# Patient Record
Sex: MALE | Race: WHITE | Employment: OTHER | URBAN - METROPOLITAN AREA
[De-identification: names, ages, dates, MRNs, and addresses within clinical notes are randomized per-mention and may not be internally consistent; named-entity substitution may affect disease eponyms.]

---

## 2019-10-16 ENCOUNTER — OFFICE VISIT (OUTPATIENT)
Dept: INTERNAL MEDICINE CLINIC | Age: 84
End: 2019-10-16

## 2019-10-16 VITALS
OXYGEN SATURATION: 96 % | WEIGHT: 178 LBS | TEMPERATURE: 98 F | RESPIRATION RATE: 16 BRPM | HEART RATE: 64 BPM | SYSTOLIC BLOOD PRESSURE: 138 MMHG | DIASTOLIC BLOOD PRESSURE: 86 MMHG | HEIGHT: 68 IN | BODY MASS INDEX: 26.98 KG/M2

## 2019-10-16 DIAGNOSIS — K21.9 GASTROESOPHAGEAL REFLUX DISEASE, ESOPHAGITIS PRESENCE NOT SPECIFIED: ICD-10-CM

## 2019-10-16 DIAGNOSIS — E78.5 HYPERLIPIDEMIA, UNSPECIFIED HYPERLIPIDEMIA TYPE: ICD-10-CM

## 2019-10-16 DIAGNOSIS — R06.09 DOE (DYSPNEA ON EXERTION): ICD-10-CM

## 2019-10-16 DIAGNOSIS — G25.0 ESSENTIAL TREMOR: ICD-10-CM

## 2019-10-16 DIAGNOSIS — Z23 ENCOUNTER FOR IMMUNIZATION: ICD-10-CM

## 2019-10-16 DIAGNOSIS — Z85.820 HISTORY OF MELANOMA: ICD-10-CM

## 2019-10-16 DIAGNOSIS — N32.81 OAB (OVERACTIVE BLADDER): ICD-10-CM

## 2019-10-16 DIAGNOSIS — Z76.89 ESTABLISHING CARE WITH NEW DOCTOR, ENCOUNTER FOR: Primary | ICD-10-CM

## 2019-10-16 DIAGNOSIS — R01.1 MURMUR: ICD-10-CM

## 2019-10-16 RX ORDER — RANITIDINE 150 MG/1
150 TABLET, FILM COATED ORAL
Qty: 90 TAB | Refills: 3 | Status: SHIPPED | OUTPATIENT
Start: 2019-10-16 | End: 2019-10-16 | Stop reason: SDUPTHER

## 2019-10-16 RX ORDER — PRIMIDONE 50 MG/1
TABLET ORAL
Qty: 30 TAB | Refills: 1 | Status: SHIPPED | OUTPATIENT
Start: 2019-10-16 | End: 2019-10-16 | Stop reason: SDUPTHER

## 2019-10-16 RX ORDER — ZINC GLUCONATE 10 MG
LOZENGE ORAL
COMMUNITY
End: 2020-01-30

## 2019-10-16 RX ORDER — OMEPRAZOLE 20 MG/1
CAPSULE, DELAYED RELEASE ORAL
COMMUNITY
Start: 2019-09-07 | End: 2020-02-04 | Stop reason: SDUPTHER

## 2019-10-16 RX ORDER — RANITIDINE 150 MG/1
150 TABLET, FILM COATED ORAL
Qty: 90 TAB | Refills: 3 | Status: SHIPPED | OUTPATIENT
Start: 2019-10-16 | End: 2019-12-03

## 2019-10-16 RX ORDER — NIACIN 500 MG/1
TABLET, FILM COATED, EXTENDED RELEASE ORAL DAILY
COMMUNITY
Start: 2019-09-25 | End: 2020-09-11 | Stop reason: SDUPTHER

## 2019-10-16 RX ORDER — RANITIDINE 150 MG/1
150 TABLET, FILM COATED ORAL 2 TIMES DAILY
COMMUNITY
End: 2019-10-16 | Stop reason: SDUPTHER

## 2019-10-16 RX ORDER — LANOLIN ALCOHOL/MO/W.PET/CERES
5000 CREAM (GRAM) TOPICAL DAILY
COMMUNITY

## 2019-10-16 RX ORDER — PRIMIDONE 50 MG/1
TABLET ORAL
Qty: 90 TAB | Refills: 1 | Status: SHIPPED | OUTPATIENT
Start: 2019-10-16 | End: 2019-10-16 | Stop reason: SDUPTHER

## 2019-10-16 RX ORDER — FOLIC ACID 1 MG/1
TABLET ORAL DAILY
COMMUNITY

## 2019-10-16 RX ORDER — GLUCOSAMINE SULFATE 1500 MG
2000 POWDER IN PACKET (EA) ORAL DAILY
COMMUNITY

## 2019-10-16 RX ORDER — TOLTERODINE TARTRATE 4 MG/1
CAPSULE, EXTENDED RELEASE ORAL
COMMUNITY
Start: 2019-08-09 | End: 2019-11-01 | Stop reason: SDUPTHER

## 2019-10-16 RX ORDER — SIMVASTATIN 40 MG/1
TABLET, FILM COATED ORAL
Status: ON HOLD | COMMUNITY
Start: 2019-08-28 | End: 2019-12-04 | Stop reason: ALTCHOICE

## 2019-10-16 RX ORDER — PRIMIDONE 50 MG/1
TABLET ORAL
Qty: 90 TAB | Refills: 1 | Status: SHIPPED | OUTPATIENT
Start: 2019-10-16 | End: 2019-11-20 | Stop reason: SDUPTHER

## 2019-10-16 NOTE — PROGRESS NOTES
Nan Smith is a 80 y.o. male who presents today for Establish Care  . He has a history of There is no problem list on file for this patient. .    Today patient is here to establish care. Previous PCP in [place]. he is switching because they recently moved here. Records are not available for me to review. Patient does note that he is been a bit more sluggish or short of breath with exertion. This is been somewhat progressive over the last couple years. Denies any chest pain. Denies any lower extremity swelling. Essential tremor: Patient has had this for some time. He does note that he has tried a beta-blocker and had too many side effects with this in the past.  He does note recently has been a bit worse though he is been a bit more stressed out. We discussed options including primidone. We will start this at a low dose and taper up. Hyperlipidemia: Patient taking simvastatin. He is also on Niaspan. OAB: Patient on Detrol. He notes that this has been stable. History of melanoma: Patient notes that he had a melanoma in the past. No recurrence. GERD: Patient takes omeprazole and H2 blocker. He notes that his symptoms have been stable. Social: Patient recently moved here from [place]. He lives at home with his wife. From [facility] originally. Denies any smoking history. He is independent of all activities of daily living. 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 One son. 2 grandchildren, one great-grandchildren. Social alcohol. No significant smoking history. Family History: reviewed    ROS  Review of Systems   Constitutional: Negative for chills, fever and weight loss. HENT: Negative for congestion and sore throat. Eyes: Negative for blurred vision, double vision and photophobia. Respiratory: Positive for shortness of breath. Negative for cough and hemoptysis.     Cardiovascular: Negative for chest pain, palpitations, orthopnea, claudication, leg swelling and PND. Gastrointestinal: Negative for abdominal pain, constipation, diarrhea, heartburn, nausea and vomiting. Genitourinary: Negative for dysuria, frequency and urgency. Musculoskeletal: Negative for joint pain and myalgias. Skin: Negative for rash. Neurological: Positive for tremors. Negative for dizziness, tingling, sensory change, speech change, seizures, weakness and headaches. Endo/Heme/Allergies: Does not bruise/bleed easily. Psychiatric/Behavioral: Negative for memory loss and suicidal ideas. Visit Vitals  /86 (BP 1 Location: Left arm, BP Patient Position: Sitting)   Pulse 64   Temp 98 °F (36.7 °C) (Oral)   Resp 16   Ht 5' 7.52\" (1.715 m)   Wt 178 lb (80.7 kg)   SpO2 96%   BMI 27.45 kg/m²       Physical Exam   Constitutional: He is oriented to person, place, and time and well-developed, well-nourished, and in no distress. No distress. HENT:   Head: Normocephalic and atraumatic. Mouth/Throat: No oropharyngeal exudate. Eyes: Pupils are equal, round, and reactive to light. Conjunctivae are normal. No scleral icterus. Neck: Normal range of motion. No JVD present. No thyromegaly present. Cardiovascular: Normal rate and regular rhythm. Exam reveals no gallop and no friction rub. Murmur heard. Loud systolic ejection murmur. Pulmonary/Chest: Effort normal and breath sounds normal. No respiratory distress. He has no wheezes. Abdominal: Soft. Bowel sounds are normal. He exhibits no distension. There is no rebound and no guarding. Musculoskeletal: Normal range of motion. He exhibits no edema. Neurological: He is alert and oriented to person, place, and time. No cranial nerve deficit. Patient with an essential tremor. Involve both the upper extremities and head   Skin: Skin is dry. No rash noted.    Psychiatric: Mood, memory, affect and judgment normal.       Current Outpatient Medications   Medication Sig    NIASPAN 500 mg tablet     omeprazole (PRILOSEC) 20 mg capsule     simvastatin (ZOCOR) 40 mg tablet     DETROL LA 4 mg ER capsule     raNITIdine (ZANTAC) 150 mg tablet Take 150 mg by mouth two (2) times a day.  Vit C-Vit E-Copper-ZnOx-Lutein (PRESERVISION LUTEIN) 226 mg-200 unit -5 mg-0.8 mg cap Take  by mouth.  magnesium 250 mg tab Take  by mouth.  COQ10, UBIQUINOL, PO Take  by mouth.  cholecalciferol (VITAMIN D3) 1,000 unit cap Take  by mouth daily.  cyanocobalamin 1,000 mcg tablet Take 1,000 mcg by mouth daily.  folic acid (FOLVITE) 1 mg tablet Take  by mouth daily. No current facility-administered medications for this visit. Past Medical History:   Diagnosis Date    GERD (gastroesophageal reflux disease)     Hyperactivity of bladder     Hypercholesterolemia     Macular degeneration disease     Skin cancer (melanoma) (HCC)     Tremor       Past Surgical History:   Procedure Laterality Date    HX HERNIA REPAIR      HX MOHS PROCEDURES        Social History     Tobacco Use    Smoking status: Never Smoker    Smokeless tobacco: Never Used   Substance Use Topics    Alcohol use: Yes     Comment: beer or glass of wine nightly       Family History   Problem Relation Age of Onset    Cancer Father     Lung Cancer Father         smoker    Heart Disease Brother     Diabetes Brother     Diabetes Maternal Grandfather     Prostate Cancer Brother         Allergies   Allergen Reactions    Penicillins Hives        Assessment/Plan  Diagnoses and all orders for this visit:    1. Establishing care with new doctor, encounter for -I reviewed patient's medical history as well as family surgical history. 2. Encounter for immunization  -     INFLUENZA VACCINE INACTIVATED (IIV), SUBUNIT, ADJUVANTED, IM  -     ADMIN INFLUENZA VIRUS VAC  -     METABOLIC PANEL, BASIC    3. Hyperlipidemia, unspecified hyperlipidemia type -labs stable in May.   Continue current therapy  -     METABOLIC PANEL, BASIC    4. OAB (overactive bladder) -stable with current therapy    5. History of melanoma -     6. Gastroesophageal reflux disease, esophagitis presence not specified -stable with current therapy  -     raNITIdine (ZANTAC) 150 mg tablet; Take 1 Tab by mouth nightly. 7. Essential tremor -is now affecting patient. He does have issues running. He has had significant side effects with beta-blockers. We discussed trying primidone at bedtime and see if this helps some. Other considerations could be gabapentin  -     primidone (MYSOLINE) 50 mg tablet; Start with one tablet at bedtime, increase by one tablet weekly as long as no side effects up to 150mg. 8. Murmur patient with a loud murmur and given the fact that he is having progressive dyspnea on exertion over the last couple years we will get-evaluation of the structure of his heart and valves. -     ECHO ADULT COMPLETE; Future    9. THEOODRE (dyspnea on exertion)  -     ECHO ADULT COMPLETE; Future            Lida Jose MD  10/16/2019    This note was created with the help of speech recognition software [employer]) and may contain some 'sound alike' errors.

## 2019-10-16 NOTE — PROGRESS NOTES
1. Have you been to the ER, urgent care clinic since your last visit? Hospitalized since your last visit? No    2. Have you seen or consulted any other health care providers outside of the [address] [place] [address] since your last visit? Include any pap smears or colon screening. Record release signed for previous PCP in [place].

## 2019-10-17 LAB
BUN SERPL-MCNC: 18 MG/DL (ref 8–27)
BUN/CREAT SERPL: 20 (ref 10–24)
CALCIUM SERPL-MCNC: 9.9 MG/DL (ref 8.6–10.2)
CHLORIDE SERPL-SCNC: 100 MMOL/L (ref 96–106)
CO2 SERPL-SCNC: 26 MMOL/L (ref 20–29)
CREAT SERPL-MCNC: 0.89 MG/DL (ref 0.76–1.27)
GLUCOSE SERPL-MCNC: 92 MG/DL (ref 65–99)
POTASSIUM SERPL-SCNC: 5.5 MMOL/L (ref 3.5–5.2)
SODIUM SERPL-SCNC: 143 MMOL/L (ref 134–144)

## 2019-10-21 ENCOUNTER — DOCUMENTATION ONLY (OUTPATIENT)
Dept: INTERNAL MEDICINE CLINIC | Age: 84
End: 2019-10-21

## 2019-10-21 NOTE — PROGRESS NOTES
Received medical records from [facility]., previous PCP office. Records have been placed on Dr Perez's desk for review.

## 2019-10-22 ENCOUNTER — TELEPHONE (OUTPATIENT)
Dept: INTERNAL MEDICINE CLINIC | Age: 84
End: 2019-10-22

## 2019-10-22 NOTE — TELEPHONE ENCOUNTER
R/C to Pt and provided central scheduling phone # to schedule echo. DWP recent lab results and advised letter has been mailed. Pt verbalized understanding.

## 2019-10-22 NOTE — TELEPHONE ENCOUNTER
Pt states he has not heard back from Dr. Raghu Gan regarding heart test (Echo). Also, he would like a call to discuss lab results and for it to be mailed to him. Thanks.

## 2019-10-23 ENCOUNTER — HOSPITAL ENCOUNTER (OUTPATIENT)
Dept: NON INVASIVE DIAGNOSTICS | Age: 84
Discharge: HOME | End: 2019-10-23
Attending: INTERNAL MEDICINE
Payer: MEDICARE

## 2019-10-23 VITALS
WEIGHT: 178 LBS | HEIGHT: 67 IN | SYSTOLIC BLOOD PRESSURE: 146 MMHG | DIASTOLIC BLOOD PRESSURE: 69 MMHG | BODY MASS INDEX: 27.94 KG/M2

## 2019-10-23 DIAGNOSIS — R06.09 DOE (DYSPNEA ON EXERTION): ICD-10-CM

## 2019-10-23 DIAGNOSIS — R01.1 MURMUR: ICD-10-CM

## 2019-10-23 LAB
AV PEAK GRADIENT: 83.82 MMHG
AV VELOCITY RATIO: 0.16
AV VTI RATIO: 0.2
ECHO AO ROOT DIAM: 3.39 CM
ECHO AV AREA PEAK VELOCITY: 0.5 CM2
ECHO AV AREA VTI: 0.5 CM2
ECHO AV MEAN GRADIENT: 56.3 MMHG
ECHO AV MEAN VELOCITY: 3.34 M/S
ECHO AV PEAK GRADIENT: 74.5 MMHG
ECHO AV PEAK VELOCITY: 431.51 CM/S
ECHO AV REGURGITANT PHT: 781.1 CM
ECHO AV VTI: 111.39 CM
ECHO EST RA PRESSURE: 3 MMHG
ECHO LA MAJOR AXIS: 5.27 CM
ECHO LA TO AORTIC ROOT RATIO: 1.56
ECHO LA VOL 2C: 75.65 ML (ref 18–58)
ECHO LA VOL 4C: 60.17 ML (ref 18–58)
ECHO LA VOL BP: 75.1 ML (ref 18–58)
ECHO LA VOL/BSA BIPLANE: 39.03 ML/M2 (ref 16–28)
ECHO LA VOLUME INDEX A2C: 39.31 ML/M2 (ref 16–28)
ECHO LA VOLUME INDEX A4C: 31.27 ML/M2 (ref 16–28)
ECHO LV INTERNAL DIMENSION DIASTOLIC: 5.35 CM (ref 4.2–5.9)
ECHO LV INTERNAL DIMENSION SYSTOLIC: 3.77 CM
ECHO LV IVSD: 1.32 CM (ref 0.6–1)
ECHO LV MASS 2D: 291.4 G (ref 88–224)
ECHO LV MASS INDEX 2D: 151.4 G/M2 (ref 49–115)
ECHO LV POSTERIOR WALL DIASTOLIC: 0.97 CM (ref 0.6–1)
ECHO LVOT DIAM: 2.07 CM
ECHO LVOT PEAK GRADIENT: 1.9 MMHG
ECHO LVOT PEAK VELOCITY: 69.45 CM/S
ECHO LVOT SV: 60.4 ML
ECHO LVOT VTI: 17.97 CM
ECHO MV A VELOCITY: 70.58 CM/S
ECHO MV E DECELERATION TIME (DT): 462.1 MS
ECHO MV E VELOCITY: 46.24 CM/S
ECHO MV E/A RATIO: 0.7
ECHO MV REGURGITANT PEAK GRADIENT: 110.5 MMHG
ECHO MV REGURGITANT PEAK VELOCITY: 525.71 CM/S
ECHO PULMONARY ARTERY SYSTOLIC PRESSURE (PASP): 37.8 MMHG
ECHO PV MAX VELOCITY: 70.39 CM/S
ECHO PV PEAK GRADIENT: 2 MMHG
ECHO RIGHT VENTRICULAR SYSTOLIC PRESSURE (RVSP): 37.8 MMHG
ECHO RV INTERNAL DIMENSION: 2.84 CM
ECHO TV REGURGITANT MAX VELOCITY: 295.17 CM/S
ECHO TV REGURGITANT PEAK GRADIENT: 34.8 MMHG
LVFS 2D: 29.47 %
LVOT MG: 1.3 MMHG
LVOT MV: 0.56 CM/S
MV DEC SLOPE: 1
PISA AR MAX VEL: 457.77 CM/S
PULMONARY ARTERY END DIASTOLIC PRESSURE: 8.1 MMHG
PULMONARY ARTERY MEAN PRESURE: 18 MMHG
PV END DIASTOLIC VELOCITY: 1.1 MMHG

## 2019-10-23 PROCEDURE — 93306 TTE W/DOPPLER COMPLETE: CPT

## 2019-10-23 NOTE — PROGRESS NOTES
Reviewed findings over the phone with patient. He notes understanding about what is going on has discussed this with cardiologist.  He already has follow-up with cardiology.

## 2019-10-28 ENCOUNTER — HOSPITAL ENCOUNTER (OUTPATIENT)
Dept: LAB | Age: 84
Discharge: HOME | End: 2019-10-28
Payer: MEDICARE

## 2019-10-28 ENCOUNTER — OFFICE VISIT (OUTPATIENT)
Dept: CARDIOLOGY CLINIC | Age: 84
End: 2019-10-28

## 2019-10-28 VITALS
SYSTOLIC BLOOD PRESSURE: 104 MMHG | HEART RATE: 62 BPM | BODY MASS INDEX: 28.09 KG/M2 | HEIGHT: 67 IN | OXYGEN SATURATION: 96 % | DIASTOLIC BLOOD PRESSURE: 62 MMHG | WEIGHT: 179 LBS | RESPIRATION RATE: 16 BRPM

## 2019-10-28 DIAGNOSIS — R06.09 DOE (DYSPNEA ON EXERTION): Primary | ICD-10-CM

## 2019-10-28 DIAGNOSIS — R01.1 MURMUR: ICD-10-CM

## 2019-10-28 PROCEDURE — 36415 COLL VENOUS BLD VENIPUNCTURE: CPT

## 2019-10-28 PROCEDURE — 85610 PROTHROMBIN TIME: CPT

## 2019-10-28 PROCEDURE — 80048 BASIC METABOLIC PNL TOTAL CA: CPT

## 2019-10-28 PROCEDURE — 85027 COMPLETE CBC AUTOMATED: CPT

## 2019-10-28 NOTE — H&P (VIEW-ONLY)
Ethan Cornell MD 
 
[address] [facility] [place], [address] [facility] Office [phone] Pager [phone] Karsten Andino is a 80 y.o. male referred for evaluation of dyspnea/murmur/abnormal ECHO. Consult requested by Laureano Epps MD 
 
Primary care physician: 
Laureano Epps MD 
 
 
Chief complaint: 
Karsten Andino is a 80 y.o. male who complains of  
Chief Complaint Patient presents with  New Patient  Cholesterol Problem  Shortness of Breath  Heart Murmur Dear Dr Akil Moura, 
 
I had the pleasure of seeing Francoise Rosenberg  in the office today. Assessment: 
Severe AS- symptomatic HLD Hx of GERD Plan: He will need Krish/AVR work-up. Will schedule for RHC/LHC. Will discuss with the valve clinic/Dr. Jeanine Oden and get appt to be seen. Lipids under control per patient. Aggressive cardiovascular risk factor modification. F/u 6 months/prn/1he will followed by valve clinic prior and see me after Patient understands the plan. All questions were answered to the patient's satisfaction. Medication Side Effects and Warnings were discussed with patient: yes Patient Labs were reviewed and or requested:  yes Patient Past Records were reviewed and or requested: yes I appreciate the opportunity to be involved in . Please see note below for details. Please do not hesitate to contact us with questions or concerns. Ethan Cornell MD 
 
Cardiac Testing/ Procedures: A. Cardiac Cath/PCI: 
 
B.ECHO/SHARAN: 10/23/19 Left Ventricle: Normal cavity size and systolic function (ejection fraction normal). Mildly increased wall thickness. Estimated left ventricular ejection fraction is 56 - 60%. No regional wall motion abnormality noted. Mild (grade 1) left ventricular diastolic dysfunction. · Mitral Valve: Mitral annular calcification. Mild mitral valve regurgitation is present. · Left Atrium: Dilated left atrium. · Tricuspid Valve: Mild tricuspid valve regurgitation is present. · Aortic Valve: Severe aortic valve sclerosis with reduced excursion. Aortic valve leaflet calcification present with reduced excursion. Severe aortic valve stenosis is present. Mild to moderate aortic valve regurgitation is present. Aortic Valve Systolic Mean Gradient 56 mm Hg; AV peak gradient ( pedhoff) -74 mm Hg C.StressNuclear/Stress ECHO/Stress test: D.Vascular: 
 
E. EP: 
 
F. Miscellaneous: 
 
History of present illness: 
 
80 yr old CM with history of progressive dyspnea for the past 1 year. Previously used to walk 3 miles easily but of late has had difficulty walking 2 miles. Takes an hour to walk 2 miles. No chest pain/dizziness/syncope/swelling lower extremities. He was recently seen by his PCP and was diagnosed to have a significant murmur for which he had an echocardiogram which showed severe aortic stenosis. He is here to establish care and for further evaluation. Past Medical History:  
Diagnosis Date  GERD (gastroesophageal reflux disease)  Hyperactivity of bladder  Hypercholesterolemia  Macular degeneration disease  Skin cancer (melanoma) ([facility] [address]  Tremor Past Surgical History:  
Procedure Laterality Date  HX HERNIA REPAIR    
 HX MOHS PROCEDURES Family History Problem Relation Age of Onset  Cancer Father  Lung Cancer Father   
     smoker  Heart Disease Brother  Diabetes Brother  Diabetes Maternal Grandfather  Prostate Cancer Brother Social History Tobacco Use  Smoking status: Never Smoker  Smokeless tobacco: Never Used Substance Use Topics  Alcohol use: Yes Comment: beer or glass of wine nightly  Drug use: Never Medications before admission: 
 
Current Outpatient Medications Medication Sig Dispense  NIASPAN 500 mg tablet daily.  omeprazole (PRILOSEC) 20 mg capsule  simvastatin (ZOCOR) 40 mg tablet  DETROL LA 4 mg ER capsule  Vit C-Vit E-Copper-ZnOx-Lutein (PRESERVISION LUTEIN) 226 mg-200 unit -5 mg-0.8 mg cap Take  by mouth.  magnesium 250 mg tab Take  by mouth.  COQ10, UBIQUINOL, PO Take  by mouth.  cholecalciferol (VITAMIN D3) 1,000 unit cap Take  by mouth daily.  cyanocobalamin 1,000 mcg tablet Take 1,000 mcg by mouth daily.  folic acid (FOLVITE) 1 mg tablet Take  by mouth daily.  primidone (MYSOLINE) 50 mg tablet Start with one tablet at bedtime, increase by one tablet weekly as long as no side effects up to 150mg. 90 Tab  raNITIdine (ZANTAC) 150 mg tablet Take 1 Tab by mouth nightly. 90 Tab No current facility-administered medications for this visit. Review of Systems: 
(bold if positive, if negative) Gen:  atigueEyes:  ENT:  CVS:  Pulm:  GI:  :  MS:  Skin:  Psych:  Endo:  Hem:  Renal:  Neuro: 
 
 
Physical Exam: 
Visit Vitals /62 (BP 1 Location: Right arm, BP Patient Position: Sitting) Pulse 62 Resp 16 Ht 5' 7\" (1.702 m) Wt 179 lb (81.2 kg) SpO2 96% BMI 28.04 kg/m² Gen: Well-developed, well-nourished, in no acute distress HEENT:  Pink conjunctivae, hearing intact to voice, moist mucous membranes Neck: Supple,No JVD, No Carotid Bruit, Thyroid- non tender Resp: No accessory muscle use, Clear breath sounds, No rales or rhonchi 
Card: Regular Rate,Rythm,Normal S1, S2, 3/6 sys murmur+,No rubs or gallop. No thrills. Abd:  Soft, non-tender, non-distended, normoactive bowel sounds are present,  
MSK: No cyanosis or clubbing Skin: No rashes or ulcers Neuro:  moving all four extremities, no focal deficit, follows commands appropriately Psych:  Good insight, oriented to person, place and time, alert, Nml Affect LE: No edema Vascular: Radial Pulses 2+ and symmetric EKG: Sinus rhythm, PAC, PVC, normal axis, nonspecific ST-T changes Cxray: 
 
LABS: 
 
No results for input(s): CPK, TROIQ in the last 72 hours. No lab exists for component: CKQMB, CPKMB No results found for: WBC, WBCLT, HGB, HGBP, HCT, PHCT, RBCH, PLT, MCV, HGBEXT, HCTEXT, PLTEXT, HGBEXT, HCTEXT, PLTEXT Lab Results Component Value Date/Time Sodium 143 10/16/2019 11:27 AM  
 Potassium 5.5 (H) 10/16/2019 11:27 AM  
 Chloride 100 10/16/2019 11:27 AM  
 CO2 26 10/16/2019 11:27 AM  
 Glucose 92 10/16/2019 11:27 AM  
 BUN 18 10/16/2019 11:27 AM  
 Creatinine 0.89 10/16/2019 11:27 AM  
 BUN/Creatinine ratio 20 10/16/2019 11:27 AM  
 GFR est AA 91 10/16/2019 11:27 AM  
 GFR est non-AA 79 10/16/2019 11:27 AM  
 Calcium 9.9 10/16/2019 11:27 AM  
 
 
ATTENTION:  
This medical record was transcribed using an electronic medical records/speech recognition system. Although proofread, it may and can contain electronic, spelling and other errors. Corrections may be executed at a later time. Please feel free to contact us for any clarifications as needed.  
 
 
 
Aidee Duff MD

## 2019-10-28 NOTE — PROGRESS NOTES
ROOM # 7    Echo 10/23/19    SOB with exertion    BP low today    Visit Vitals  BP 92/54 (BP 1 Location: Left arm, BP Patient Position: Sitting)   Pulse 62   Resp 16   Ht 5' 7\" (1.702 m)   Wt 179 lb (81.2 kg)   SpO2 96%   BMI 28.04 kg/m²

## 2019-10-28 NOTE — PROGRESS NOTES
Caden Baum MD    [phone] [facility] [place], [address] [facility]    Office [phone],CADE [phone]  Pager [phone]    Gerrie Leyden is a 80 y.o. male referred for evaluation of dyspnea/murmur/abnormal ECHO. Consult requested by Misti Guzman MD    Primary care physician:  Misti Guzman MD      Chief complaint:  Gerrie Leyden is a 80 y.o. male who complains of   Chief Complaint   Patient presents with   [address] [facility] Patient    Cholesterol Problem    Shortness of Zuhair Brome     Dear Dr Amaury Mejia,    I had the pleasure of seeing Ev Dow  in the office today. Assessment:  Severe AS- symptomatic  HLD  Hx of GERD      Plan:   He will need Krish/AVR work-up. Will schedule for RHC/LHC. Will discuss with the valve clinic/Dr. Britney Thomas and get appt to be seen. Lipids under control per patient. Aggressive cardiovascular risk factor modification. F/u 6 months/prn/1he will followed by valve clinic prior and see me after       Patient understands the plan. All questions were answered to the patient's satisfaction. Medication Side Effects and Warnings were discussed with patient: yes  Patient Labs were reviewed and or requested:  yes  Patient Past Records were reviewed and or requested: yes    I appreciate the opportunity to be involved in . Please see note below for details. Please do not hesitate to contact us with questions or concerns. Caden Baum MD    Cardiac Testing/ Procedures: A. Cardiac Cath/PCI:    B.ECHO/SHARAN: 10/23/19 Left Ventricle: Normal cavity size and systolic function (ejection fraction normal). Mildly increased wall thickness. Estimated left ventricular ejection fraction is 56 - 60%. No regional wall motion abnormality noted. Mild (grade 1) left ventricular diastolic dysfunction. · Mitral Valve: Mitral annular calcification. Mild mitral valve regurgitation is present.   · Left Atrium: Dilated left atrium. · Tricuspid Valve: Mild tricuspid valve regurgitation is present. · Aortic Valve: Severe aortic valve sclerosis with reduced excursion. Aortic valve leaflet calcification present with reduced excursion. Severe aortic valve stenosis is present. Mild to moderate aortic valve regurgitation is present. Aortic Valve Systolic Mean Gradient 56 mm Hg; AV peak gradient ( pedhoff) -74 mm Hg    C.StressNuclear/Stress ECHO/Stress test:    D.Vascular:    E. EP:    F. Miscellaneous:    History of present illness:    80 yr old CM with history of progressive dyspnea for the past 1 year. Previously used to walk 3 miles easily but of late has had difficulty walking 2 miles. Takes an hour to walk 2 miles. No chest pain/dizziness/syncope/swelling lower extremities. He was recently seen by his PCP and was diagnosed to have a significant murmur for which he had an echocardiogram which showed severe aortic stenosis. He is here to establish care and for further evaluation. Past Medical History:   Diagnosis Date    GERD (gastroesophageal reflux disease)     Hyperactivity of bladder     Hypercholesterolemia     Macular degeneration disease     Skin cancer (melanoma) (HCC)     Tremor       Past Surgical History:   Procedure Laterality Date    HX HERNIA REPAIR      HX MOHS PROCEDURES       Family History   Problem Relation Age of Onset    Cancer Father     Lung Cancer Father         smoker    Heart Disease Brother     Diabetes Brother     Diabetes Maternal Grandfather     Prostate Cancer Brother       Social History     Tobacco Use    Smoking status: Never Smoker    Smokeless tobacco: Never Used   Substance Use Topics    Alcohol use: Yes     Comment: beer or glass of wine nightly     Drug use: Never          Medications before admission:    Current Outpatient Medications   Medication Sig Dispense    NIASPAN 500 mg tablet daily.      omeprazole (PRILOSEC) 20 mg capsule      simvastatin (ZOCOR) 40 mg tablet  DETROL LA 4 mg ER capsule      Vit C-Vit E-Copper-ZnOx-Lutein (PRESERVISION LUTEIN) 226 mg-200 unit -5 mg-0.8 mg cap Take  by mouth.  magnesium 250 mg tab Take  by mouth.  COQ10, UBIQUINOL, PO Take  by mouth.  cholecalciferol (VITAMIN D3) 1,000 unit cap Take  by mouth daily.  cyanocobalamin 1,000 mcg tablet Take 1,000 mcg by mouth daily.  folic acid (FOLVITE) 1 mg tablet Take  by mouth daily.  primidone (MYSOLINE) 50 mg tablet Start with one tablet at bedtime, increase by one tablet weekly as long as no side effects up to 150mg. 90 Tab    raNITIdine (ZANTAC) 150 mg tablet Take 1 Tab by mouth nightly. 90 Tab     No current facility-administered medications for this visit. Review of Systems:  (bold if positive, if negative)    Gen:  atigueEyes:  ENT:  CVS:  Pulm:  GI:  :  MS:  Skin:  Psych:  Endo:  Hem:  Renal:  Neuro:      Physical Exam:  Visit Vitals  /62 (BP 1 Location: Right arm, BP Patient Position: Sitting)   Pulse 62   Resp 16   Ht 5' 7\" (1.702 m)   Wt 179 lb (81.2 kg)   SpO2 96%   BMI 28.04 kg/m²          Gen: Well-developed, well-nourished, in no acute distress  HEENT:  Pink conjunctivae, hearing intact to voice, moist mucous membranes  Neck: Supple,No JVD, No Carotid Bruit, Thyroid- non tender  Resp: No accessory muscle use, Clear breath sounds, No rales or rhonchi  Card: Regular Rate,Rythm,Normal S1, S2, 3/6 sys murmur+,No rubs or gallop. No thrills.    Abd:  Soft, non-tender, non-distended, normoactive bowel sounds are present,   MSK: No cyanosis or clubbing  Skin: No rashes or ulcers  Neuro:  moving all four extremities, no focal deficit, follows commands appropriately  Psych:  Good insight, oriented to person, place and time, alert, Nml Affect  LE: No edema  Vascular: Radial Pulses 2+ and symmetric        EKG: Sinus rhythm, PAC, PVC, normal axis, nonspecific ST-T changes      Cxray:    LABS:    No results for input(s): CPK, TROIQ in the last 72 hours.    No lab exists for component: CKQMB, CPKMB    No results found for: WBC, WBCLT, HGB, HGBP, HCT, PHCT, RBCH, PLT, MCV, HGBEXT, HCTEXT, PLTEXT, HGBEXT, HCTEXT, PLTEXT  Lab Results   Component Value Date/Time    Sodium 143 10/16/2019 11:27 AM    Potassium 5.5 (H) 10/16/2019 11:27 AM    Chloride 100 10/16/2019 11:27 AM    CO2 26 10/16/2019 11:27 AM    Glucose 92 10/16/2019 11:27 AM    BUN 18 10/16/2019 11:27 AM    Creatinine 0.89 10/16/2019 11:27 AM    BUN/Creatinine ratio 20 10/16/2019 11:27 AM    GFR est AA 91 10/16/2019 11:27 AM    GFR est non-AA 79 10/16/2019 11:27 AM    Calcium 9.9 10/16/2019 11:27 AM       ATTENTION:   This medical record was transcribed using an electronic medical records/speech recognition system. Although proofread, it may and can contain electronic, spelling and other errors. Corrections may be executed at a later time. Please feel free to contact us for any clarifications as needed.         Gracie Blackmon MD

## 2019-10-29 LAB
BUN SERPL-MCNC: 18 MG/DL (ref 8–27)
BUN/CREAT SERPL: 21 (ref 10–24)
CALCIUM SERPL-MCNC: 9.4 MG/DL (ref 8.6–10.2)
CHLORIDE SERPL-SCNC: 99 MMOL/L (ref 96–106)
CO2 SERPL-SCNC: 23 MMOL/L (ref 20–29)
CREAT SERPL-MCNC: 0.86 MG/DL (ref 0.76–1.27)
ERYTHROCYTE [DISTWIDTH] IN BLOOD BY AUTOMATED COUNT: 12 % (ref 12.3–15.4)
GLUCOSE SERPL-MCNC: 94 MG/DL (ref 65–99)
HCT VFR BLD AUTO: 45.7 % (ref 37.5–51)
HGB BLD-MCNC: 15.3 G/DL (ref 13–17.7)
INR PPP: 1.1 (ref 0.8–1.2)
MCH RBC QN AUTO: 29.8 PG (ref 26.6–33)
MCHC RBC AUTO-ENTMCNC: 33.5 G/DL (ref 31.5–35.7)
MCV RBC AUTO: 89 FL (ref 79–97)
PLATELET # BLD AUTO: 144 X10E3/UL (ref 150–450)
POTASSIUM SERPL-SCNC: 4.3 MMOL/L (ref 3.5–5.2)
PROTHROMBIN TIME: 11.7 SEC (ref 9.1–12)
RBC # BLD AUTO: 5.13 X10E6/UL (ref 4.14–5.8)
SODIUM SERPL-SCNC: 141 MMOL/L (ref 134–144)
WBC # BLD AUTO: 6.1 X10E3/UL (ref 3.4–10.8)

## 2019-10-30 DIAGNOSIS — R06.09 DYSPNEA ON EXERTION: Primary | ICD-10-CM

## 2019-10-30 RX ORDER — SODIUM CHLORIDE 0.9 % (FLUSH) 0.9 %
5-40 SYRINGE (ML) INJECTION EVERY 8 HOURS
Status: CANCELLED | OUTPATIENT
Start: 2019-11-06

## 2019-10-30 RX ORDER — SODIUM CHLORIDE 0.9 % (FLUSH) 0.9 %
5-40 SYRINGE (ML) INJECTION AS NEEDED
Status: CANCELLED | OUTPATIENT
Start: 2019-11-06

## 2019-10-30 RX ORDER — SODIUM CHLORIDE 9 MG/ML
75 INJECTION, SOLUTION INTRAVENOUS CONTINUOUS
Status: CANCELLED | OUTPATIENT
Start: 2019-11-06

## 2019-10-31 ENCOUNTER — TELEPHONE (OUTPATIENT)
Dept: CARDIOLOGY CLINIC | Age: 84
End: 2019-10-31

## 2019-10-31 NOTE — TELEPHONE ENCOUNTER
Patient is inquiring about the appointment made with Dr Wendi Simpson on 11/5. He states that he knew nothing about it and would like to know why he needs to see another doctor. Please advise.      Phone: [phone]

## 2019-11-01 RX ORDER — TOLTERODINE TARTRATE 4 MG/1
4 CAPSULE, EXTENDED RELEASE ORAL DAILY
Qty: 90 CAP | Refills: 1 | Status: SHIPPED | OUTPATIENT
Start: 2019-11-01 | End: 2020-04-07

## 2019-11-01 NOTE — TELEPHONE ENCOUNTER
Returned patient's call spoke with his wife advised he was seeing Dr Kwabena Doan regarding his Aortic valve stenosis on 11/5/19.

## 2019-11-05 ENCOUNTER — OFFICE VISIT (OUTPATIENT)
Dept: CARDIOLOGY CLINIC | Age: 84
End: 2019-11-05

## 2019-11-05 VITALS
OXYGEN SATURATION: 98 % | SYSTOLIC BLOOD PRESSURE: 108 MMHG | HEART RATE: 59 BPM | HEIGHT: 67 IN | RESPIRATION RATE: 14 BRPM | WEIGHT: 181.2 LBS | BODY MASS INDEX: 28.44 KG/M2 | DIASTOLIC BLOOD PRESSURE: 64 MMHG

## 2019-11-05 DIAGNOSIS — I35.0 AORTIC VALVE STENOSIS, ETIOLOGY OF CARDIAC VALVE DISEASE UNSPECIFIED: Primary | ICD-10-CM

## 2019-11-05 NOTE — LETTER
11/5/19 Patient: Nikki Kapoor YOB: 1934 Date of Visit: 11/5/2019 Henri Pinzon MD 
[address] [facility] [address] [facility] [address] VIA In Basket Dear Henri Pinzon MD, Thank you for referring Mr. Edgar Early to [facility] for evaluation. My notes for this consultation are attached. If you have questions, please do not hesitate to call me. I look forward to following your patient along with you.  
 
 
Sincerely, 
 
Brian Cordon MD

## 2019-11-05 NOTE — PROGRESS NOTES
[facility] [place]: Bushra Arango  [phone]          Cardiology Consult/Progress Note      Requesting/referring provider: Dr. Taylor Laurent, Dr. Lorena Griffith  Reason for Consult: Mr. Erick Syed 79 isaortic valve disease    HPI: Fransisco Fernando, a 80y.o. year-old who presents for evaluation of aortic valve disease . Has reported progressive exertional intolerance and shortness of breath over the past 1 year. Until 1 year ago he could walk 4 to 5 miles at a stretch. Now he reports shortness of breath walking 2 miles on a flat surface or even half a mile on an incline. I would consider this functional status consistent with NYHA class II symptoms. He denies any pedal edema, syncope, presyncope or chest discomfort. He is known to have a cardiac murmur for past 5 to 8 years. On his recent evaluation  thought that his murmur appears concerning for progression of aortic stenosis and frequently he was referred to . An echocardiogram was performed that confirmed severe aortic stenosis and he is referred here for subsequent management. His medical problems include gastroesophageal reflux disease and essential tremors. Otherwise he has no major medical comorbidities. Investigations:   ECG: Sinus rhythm frequent PACs mild intraventricular conduction delay with QRS of 110 ms. Echocardiogram: October 2019 personally reviewed: LV systolic function 55 to 17%. Severe aortic stenosis. Calculated aortic valve area 0.7 cm², peak velocity 5.3 m/s, peak gradient 110 mmHg, mean gradient 60 mmHg. Mild aortic regurgitation, mild tricuspid regurg without pulmonary hypertension. Carotid Dopplers: To be performed today  Cardiac catheterization: To be performed tomorrow  :      Assessment/Plan:  1. Senile calcific severe aortic stenosis with NYHA class II limitation  2. Hyperlipidemia  3.   Tremors     Mr. Quoc Donaldson was seen at the request of Dr. Joyce Ozuna and . He is not demonstrating symptoms from severe senile calcific aortic stenosis. I informed the patient that in the absence of any definitive therapy severe symptomatic aortic stenosis confers a 50% 2 to 5-year mortality. I specifically discussed TAVR related procedural risks such as vascular complication, risk of stroke, risk of pacemaker need post TAVR, risk of conversion to open surgery, death from TAVR procedure. I also discussed the expected benefits include improvement in functional status, reduction in angina, improvement in exertional tolerance from the procedure. Finally I reviewed expected length of stay in the recovery with the patient based on if the perioperative course is complicated versus uncomplicated. Patient and wife voiced understanding and are willing to proceed with further course of action. I also informed him that the procedure is contingent upon appropriate findings on his CT scan as well as an coronary angiogram.  I will plan to see him back in the valve clinic after all the work-up is completed. [x]    High complexity decision making was performed  []    Patient is at high-risk of decompensation with multiple organ involvement  He  has a past medical history of GERD (gastroesophageal reflux disease), Hyperactivity of bladder, Hypercholesterolemia, Macular degeneration disease, Skin cancer (melanoma) ([facility] [address] and Tremor. He also has no past medical history of Hypertension. Review of system:Patient reports no PND/Orthpnea/syncope. He reports no cough/fever/focal neurological deficits/abdominal pain. All other systems negative except as above.    Family History   Problem Relation Age of Onset    Cancer Father     Lung Cancer Father         smoker    Heart Disease Brother     Diabetes Brother     Diabetes Maternal Grandfather     Prostate Cancer Brother       Social History     Socioeconomic History    Marital status:      Spouse name: Not on file    Number of children: Not on file    Years of education: Not on file    Highest education level: Not on file   Tobacco Use    Smoking status: Never Smoker    Smokeless tobacco: Never Used   Substance and Sexual Activity    Alcohol use: Yes     Comment: beer or glass of wine nightly     Drug use: Never    Sexual activity: Not Currently     Partners: Female      PE  Vitals:    11/05/19 0912   BP: 108/64   Pulse: (!) 59   Resp: 14   SpO2: 98%   Weight: 181 lb 3.2 oz (82.2 kg)   Height: 5' 7\" (1.702 m)    Body mass index is 28.38 kg/m². General:    Alert, cooperative, no distress. Psychiatric:    Normal Mood and affect    Eye/ENT:      Pupils equal, No asymmetry, Conjunctival pink. Able to hear voice at normal amplitude   Lungs:      Visibly symmetric chest expansion, No palpable tenderness. Clear to auscultation bilaterally. Heart[de-identified]    Regular rate and rhythm, S1, normal, S2 significantly attenuated, late peaking midsystolic murmur best heard in the right upper sternal border with radiation to the apex of the heart. No, click, rub or gallop. No JVD, Normal palpable peripheral pulses. No cyanosis   Abdomen:     Soft, non-tender. Bowel sounds normal. No masses,  No      organomegaly. Extremities:   Extremities normal, atraumatic, no edema. Neurologic:   CN II-XII grossly intact.  No gross focal deficits           Recent Labs:  No results found for: CHOL, CHOLX, CHLST, CHOLV, 944195, HDL, HDLP, LDL, LDLC, DLDLP, TGLX, TRIGL, TRIGP, CHHD, [facility]  Lab Results   Component Value Date/Time    Creatinine 0.86 10/28/2019 03:13 PM     Lab Results   Component Value Date/Time    BUN 18 10/28/2019 03:13 PM     Lab Results   Component Value Date/Time    Potassium 4.3 10/28/2019 03:13 PM     No results found for: HBA1C, HGBE8, PRP6ARYD  Lab Results   Component Value Date/Time    HGB 15.3 10/28/2019 03:13 PM     Lab Results   Component Value Date/Time    PLATELET 796 (L) 22/31/3943 03:13 PM       Reviewed:  Past Medical History: Diagnosis Date    GERD (gastroesophageal reflux disease)     Hyperactivity of bladder     Hypercholesterolemia     Macular degeneration disease     Skin cancer (melanoma) (HCC)     Tremor      Social History     Tobacco Use   Smoking Status Never Smoker   Smokeless Tobacco Never Used     Social History     Substance and Sexual Activity   Alcohol Use Yes    Comment: beer or glass of wine nightly      Allergies   Allergen Reactions    Penicillins Hives     Family History   Problem Relation Age of Onset    Cancer Father     Lung Cancer Father         smoker    Heart Disease Brother     Diabetes Brother     Diabetes Maternal Grandfather     Prostate Cancer Brother         Current Outpatient Medications   Medication Sig    DETROL LA 4 mg ER capsule Take 1 Cap by mouth daily.  NIASPAN 500 mg tablet daily.  omeprazole (PRILOSEC) 20 mg capsule     simvastatin (ZOCOR) 40 mg tablet     Vit C-Vit E-Copper-ZnOx-Lutein (PRESERVISION LUTEIN) 226 mg-200 unit -5 mg-0.8 mg cap Take  by mouth.  magnesium 250 mg tab Take  by mouth.  COQ10, UBIQUINOL, PO Take  by mouth.  cholecalciferol (VITAMIN D3) 1,000 unit cap Take  by mouth daily.  cyanocobalamin 1,000 mcg tablet Take 1,000 mcg by mouth daily.  folic acid (FOLVITE) 1 mg tablet Take  by mouth daily.  primidone (MYSOLINE) 50 mg tablet Start with one tablet at bedtime, increase by one tablet weekly as long as no side effects up to 150mg.  raNITIdine (ZANTAC) 150 mg tablet Take 1 Tab by mouth nightly. No current facility-administered medications for this visit. Shanda De Dios MD11/05/19 There are other unrelated non-urgent complaints, but due to the busy schedule and the amount of time I've already spent with him, time does not permit me to address these routine issues at today's visit. I've requested another appointment to review these additional issues.     ATTENTION:   This medical record was transcribed using an electronic medical records/speech recognition system. Although proofread, it may and can contain electronic, spelling and other errors. Corrections may be executed at a later time. Please feel free to contact us for any clarifications as needed.     [facility] [place]  [address], [address], [address] [place] [address] Avenue

## 2019-11-06 ENCOUNTER — HOSPITAL ENCOUNTER (OUTPATIENT)
Age: 84
Setting detail: OUTPATIENT SURGERY
Discharge: HOME | End: 2019-11-06
Attending: INTERNAL MEDICINE | Admitting: INTERNAL MEDICINE
Payer: MEDICARE

## 2019-11-06 VITALS
SYSTOLIC BLOOD PRESSURE: 107 MMHG | WEIGHT: 179.19 LBS | BODY MASS INDEX: 27.16 KG/M2 | OXYGEN SATURATION: 96 % | HEIGHT: 68 IN | TEMPERATURE: 98 F | RESPIRATION RATE: 14 BRPM | HEART RATE: 60 BPM | DIASTOLIC BLOOD PRESSURE: 48 MMHG

## 2019-11-06 DIAGNOSIS — R06.09 DOE (DYSPNEA ON EXERTION): ICD-10-CM

## 2019-11-06 DIAGNOSIS — R06.09 DYSPNEA ON EXERTION: ICD-10-CM

## 2019-11-06 LAB
COHGB MFR BLD: 1.1 % (ref 1–2)
COHGB MFR BLD: 1.1 % (ref 1–2)
COHGB MFR BLD: 1.2 % (ref 1–2)
HGB BLD OXIMETRY-MCNC: 13.8 G/DL (ref 14–17)
HGB BLD OXIMETRY-MCNC: 14 G/DL (ref 14–17)
HGB BLD OXIMETRY-MCNC: 14.1 G/DL (ref 14–17)
METHGB MFR BLD: 0.2 % (ref 0–1.4)
METHGB MFR BLD: 0.4 % (ref 0–1.4)
METHGB MFR BLD: 0.5 % (ref 0–1.4)
OXYHGB MFR BLD: 74.4 % (ref 94–97)
OXYHGB MFR BLD: 75.9 % (ref 94–97)
OXYHGB MFR BLD: 93 % (ref 94–97)
SAO2 % BLD: 76 % (ref 95–99)
SAO2 % BLD: 77 % (ref 95–99)
SAO2 % BLD: 94 % (ref 95–99)

## 2019-11-06 PROCEDURE — 74011000250 HC RX REV CODE- 250: Performed by: INTERNAL MEDICINE

## 2019-11-06 PROCEDURE — C1894 INTRO/SHEATH, NON-LASER: HCPCS | Performed by: INTERNAL MEDICINE

## 2019-11-06 PROCEDURE — 77030019569 HC BND COMPR RAD TERU -B: Performed by: INTERNAL MEDICINE

## 2019-11-06 PROCEDURE — 77030004532 HC CATH ANGI DX IMP BSC -A: Performed by: INTERNAL MEDICINE

## 2019-11-06 PROCEDURE — C1769 GUIDE WIRE: HCPCS | Performed by: INTERNAL MEDICINE

## 2019-11-06 PROCEDURE — 77030029065 HC DRSG HEMO QCLOT ZMED -B

## 2019-11-06 PROCEDURE — 99153 MOD SED SAME PHYS/QHP EA: CPT | Performed by: INTERNAL MEDICINE

## 2019-11-06 PROCEDURE — 77030008543 HC TBNG MON PRSS MRTM -A: Performed by: INTERNAL MEDICINE

## 2019-11-06 PROCEDURE — 74011250636 HC RX REV CODE- 250/636: Performed by: INTERNAL MEDICINE

## 2019-11-06 PROCEDURE — C1751 CATH, INF, PER/CENT/MIDLINE: HCPCS | Performed by: INTERNAL MEDICINE

## 2019-11-06 PROCEDURE — 82375 ASSAY CARBOXYHB QUANT: CPT

## 2019-11-06 PROCEDURE — 77030004522 HC CATH ANGI DX EXPO BSC -A: Performed by: INTERNAL MEDICINE

## 2019-11-06 PROCEDURE — 93460 R&L HRT ART/VENTRICLE ANGIO: CPT | Performed by: INTERNAL MEDICINE

## 2019-11-06 PROCEDURE — 99152 MOD SED SAME PHYS/QHP 5/>YRS: CPT | Performed by: INTERNAL MEDICINE

## 2019-11-06 PROCEDURE — 74011636320 HC RX REV CODE- 636/320: Performed by: INTERNAL MEDICINE

## 2019-11-06 RX ORDER — HEPARIN SODIUM 200 [USP'U]/100ML
INJECTION, SOLUTION INTRAVENOUS
Status: COMPLETED | OUTPATIENT
Start: 2019-11-06 | End: 2019-11-06

## 2019-11-06 RX ORDER — SODIUM CHLORIDE 0.9 % (FLUSH) 0.9 %
5-40 SYRINGE (ML) INJECTION AS NEEDED
Status: DISCONTINUED | OUTPATIENT
Start: 2019-11-06 | End: 2019-11-06 | Stop reason: HOSPADM

## 2019-11-06 RX ORDER — MIDAZOLAM HYDROCHLORIDE 1 MG/ML
INJECTION, SOLUTION INTRAMUSCULAR; INTRAVENOUS AS NEEDED
Status: DISCONTINUED | OUTPATIENT
Start: 2019-11-06 | End: 2019-11-06 | Stop reason: HOSPADM

## 2019-11-06 RX ORDER — VERAPAMIL HYDROCHLORIDE 2.5 MG/ML
INJECTION, SOLUTION INTRAVENOUS AS NEEDED
Status: DISCONTINUED | OUTPATIENT
Start: 2019-11-06 | End: 2019-11-06 | Stop reason: HOSPADM

## 2019-11-06 RX ORDER — FENTANYL CITRATE 50 UG/ML
INJECTION, SOLUTION INTRAMUSCULAR; INTRAVENOUS AS NEEDED
Status: DISCONTINUED | OUTPATIENT
Start: 2019-11-06 | End: 2019-11-06 | Stop reason: HOSPADM

## 2019-11-06 RX ORDER — LIDOCAINE HYDROCHLORIDE 10 MG/ML
INJECTION INFILTRATION; PERINEURAL AS NEEDED
Status: DISCONTINUED | OUTPATIENT
Start: 2019-11-06 | End: 2019-11-06 | Stop reason: HOSPADM

## 2019-11-06 RX ORDER — HEPARIN SODIUM 1000 [USP'U]/ML
INJECTION, SOLUTION INTRAVENOUS; SUBCUTANEOUS AS NEEDED
Status: DISCONTINUED | OUTPATIENT
Start: 2019-11-06 | End: 2019-11-06 | Stop reason: HOSPADM

## 2019-11-06 RX ORDER — SODIUM CHLORIDE 9 MG/ML
75 INJECTION, SOLUTION INTRAVENOUS CONTINUOUS
Status: DISCONTINUED | OUTPATIENT
Start: 2019-11-06 | End: 2019-11-06 | Stop reason: HOSPADM

## 2019-11-06 RX ORDER — SODIUM CHLORIDE 0.9 % (FLUSH) 0.9 %
5-40 SYRINGE (ML) INJECTION EVERY 8 HOURS
Status: DISCONTINUED | OUTPATIENT
Start: 2019-11-06 | End: 2019-11-06 | Stop reason: HOSPADM

## 2019-11-06 RX ADMIN — SODIUM CHLORIDE 75 ML/HR: 9 INJECTION, SOLUTION INTRAVENOUS at 08:48

## 2019-11-06 NOTE — INTERVAL H&P NOTE
H&P Update:  Syed Walker was seen and examined. History and physical has been reviewed. The patient has been examined.  There have been no significant clinical changes since the completion of the originally dated History and Physical.

## 2019-11-06 NOTE — Clinical Note
Sheath #1: Sheath: inserted. Sheath inserted/placed in the right brachialcephalic  vein. Hemostasis achieved.

## 2019-11-06 NOTE — PROGRESS NOTES
6428    Patient arrived. ID and allergies verified verbally with patient. Pt voices understanding of procedure to be performed. Consent obtained. Pt prepped for procedure. Right and left heart cath     0845    TRANSFER - OUT REPORT:    Verbal report given to Cinda Moritz RN (name) on [facility]  being transferred to  Cath (unit) for routine progression of care       Report consisted of patients Situation, Background, Assessment and   Recommendations(SBAR). Information from the following report(s) SBAR was reviewed with the receiving nurse. Lines:   Peripheral IV 11/06/19 Right Antecubital (Active)       Peripheral IV 11/06/19 Left Forearm (Active)        Opportunity for questions and clarification was provided. Patient transported with:   Registered Nurse    1000    TRANSFER - IN REPORT:    Verbal report received from Rikki Jj RN (name) on [facility]  being received from  Cath (unit) for routine progression of care      Report consisted of patients Situation, Background, Assessment and   Recommendations(SBAR). Information from the following report(s) SBAR was reviewed with the receiving nurse. Opportunity for questions and clarification was provided. Assessment completed upon patients arrival to unit and care assumed. 1005    Blood aspirated from sheath then  sheath pulled 7  Fr right brachial . Quikclot applied. Manual pressure held     1015    Hemostasis achieved at right brachial . Dressing applied. Pt voices understanding of post procedure bedrest instructions. 1100    2 ml air released from TR Band. No bleeding or hematoma noted. Radial and Ulnar pulse on right  wrist palpable. Pt tolerated well. Will continue to monitor. 1105    3 ml air released from TR Band. No bleeding or hematoma noted. Radial and Ulnar pulse on right  wrist palpable. Pt tolerated well. Will continue to monitor. 1110    3 ml air released from TR Band.  No bleeding or hematoma noted. Radial and Ulnar pulse on right  wrist palpable. Pt tolerated well. Will continue to monitor. 1115    3 ml air released from TR Band. No bleeding or hematoma noted. Radial and Ulnar pulse on right  wrist palpable. Pt tolerated well. Will continue to monitor. Air release completed. TR Band removed from right  wrist. No bleeding or  Hematoma. Dressing applied. Wrist immobilizer in place. Radial and ulnar pulse remain palpable on affected extremity. Pt tolerated well. Instructions given to pt regarding movement and activity restrictions. Pt voiced understanding. 1140    Discharge instructions reviewed with patient and family. Voiced understanding. Patient given copy of discharge instructions to take home. 1205    Pt sat on side of bed then ambulated around unit and to restroom. Voided. Returned to chair. Right arm sites dressing dry and intact. No bleeding or hematoma. Pt voices no complaints. 26    Pt discharged via wheeled chair  with spouse  Personal belongings with patient upon discharge.

## 2019-11-06 NOTE — PROCEDURES
BRIEF PROCEDURE NOTE    Date of Procedure: 11/6/2019   Preoperative Diagnosis: Severe AS/Pre TAVR  Postoperative Diagnosis: Sig CAD - 1 vessel   Procedure: Left heart cath, LV angiography, coronary angiography  Interventional Cardiologist: Beau Patrick MD  Assistant : none  Anesthesia: local + IV sedation  Estimated Blood Loss: Minimal  Findings:     R Brachial vein access - 7 F sheath  R Radial access - 6 F sheath    Calcified coronaries  RCA - 3DRC; LCA - JL4  Codominant    L Main: Large; MLI    LAD: Prox - large; Mid/Distal Med; Ostial 30%; Mid - Ca+++; 80% ( haziness prob sec to Ca+); Another mid lesion - 70% ; Small to med D1- Prox 30%; Small  D2; Large tortuous septal    LCflex: Med to large; MLI; OM1 - Med to large; Prox 50%    RCA: Med to large; Prox/Mid - MLI; Distal at bifurcation 60-70%- small PDA/PLB    LVEDP: valve not crossed    LVEF: not assessed    No significant gradient across aortic valve. RHC findings:    RAPm= 4      mmHg  RVSP= 19    mmHg  PAPm= 14       mmHg  PCWPm=7    mmHg  CO=   6.02       l/min  CI=3.08    Specimens Removed : None    Complications: None    Closure Device: TR        See full cath note.     Complications: none    Beau Patrick MD

## 2019-11-06 NOTE — Clinical Note
Aspirin Registry Question:  
Aspirin was not given and not discussed with physician prior to the procedure.

## 2019-11-06 NOTE — Clinical Note
TRANSFER - OUT REPORT:  
 
Verbal report given to: NURSE AT BEDSIDE. Report consisted of patient's Situation, Background, Assessment and  
Recommendations(SBAR). Opportunity for questions and clarification was provided. Patient transported with a Registered Nurse. Patient transported to: Johney Hammans.

## 2019-11-11 ENCOUNTER — TELEPHONE (OUTPATIENT)
Dept: CARDIOLOGY CLINIC | Age: 84
End: 2019-11-11

## 2019-11-12 ENCOUNTER — HOSPITAL ENCOUNTER (OUTPATIENT)
Dept: CT IMAGING | Age: 84
Discharge: HOME | End: 2019-11-12
Attending: INTERNAL MEDICINE
Payer: MEDICARE

## 2019-11-12 DIAGNOSIS — I35.0 AORTIC VALVE STENOSIS, ETIOLOGY OF CARDIAC VALVE DISEASE UNSPECIFIED: ICD-10-CM

## 2019-11-12 PROCEDURE — 74011000258 HC RX REV CODE- 258: Performed by: RADIOLOGY

## 2019-11-12 PROCEDURE — 74174 CTA ABD&PLVS W/CONTRAST: CPT

## 2019-11-12 PROCEDURE — 71275 CT ANGIOGRAPHY CHEST: CPT

## 2019-11-12 PROCEDURE — 74011636320 HC RX REV CODE- 636/320: Performed by: RADIOLOGY

## 2019-11-12 RX ORDER — SODIUM CHLORIDE 0.9 % (FLUSH) 0.9 %
10 SYRINGE (ML) INJECTION
Status: COMPLETED | OUTPATIENT
Start: 2019-11-12 | End: 2019-11-12

## 2019-11-12 RX ADMIN — Medication 10 ML: at 13:36

## 2019-11-12 RX ADMIN — SODIUM CHLORIDE 100 ML: 900 INJECTION, SOLUTION INTRAVENOUS at 13:36

## 2019-11-12 RX ADMIN — IOPAMIDOL 120 ML: 755 INJECTION, SOLUTION INTRAVENOUS at 13:36

## 2019-11-12 NOTE — TELEPHONE ENCOUNTER
Returned patient's call he would like to know what resulted from the Cardiac Cath that was done last week 11/6/19. He knows you spoke with his wife but she could not remember what was said. He would also like to know if it is okay to proceed with the Valve replacement. Please advise.

## 2019-11-13 ENCOUNTER — DOCUMENTATION ONLY (OUTPATIENT)
Dept: CARDIOLOGY CLINIC | Age: 84
End: 2019-11-13

## 2019-11-13 NOTE — PROGRESS NOTES
D/w Dr Brooke Sethi about cardiac cath findings  Recommends - LAD - Atherectomy/PCI prior to TAVR    D/w pt by telephone - RIBA of atherectomy/PCI to LAD explained  Pt agrees to proceed  He will check with family and let us know about doing the procedure next week Thursday AM  Will co-ordinate with [facility] cath lab    [facility].  MD, [facility]

## 2019-11-13 NOTE — TELEPHONE ENCOUNTER
Called patient reviewed below message per Dr Mary Lemus. Patient verbalized understanding.     Please let him know that I have asked Dr. Rocio Nagy to review the cath films and I will get back to him soon

## 2019-11-15 ENCOUNTER — TELEPHONE (OUTPATIENT)
Dept: CARDIOLOGY CLINIC | Age: 84
End: 2019-11-15

## 2019-11-15 NOTE — TELEPHONE ENCOUNTER
Returned patient's call advised  Dr Hector Early is checking on availability/date for the procedure. Informed him we will be in touch with him the first of next week with all details. Patient verbalized understanding.

## 2019-11-20 ENCOUNTER — OFFICE VISIT (OUTPATIENT)
Dept: INTERNAL MEDICINE CLINIC | Age: 84
End: 2019-11-20

## 2019-11-20 ENCOUNTER — TELEPHONE (OUTPATIENT)
Dept: CARDIOLOGY CLINIC | Age: 84
End: 2019-11-20

## 2019-11-20 VITALS
OXYGEN SATURATION: 96 % | TEMPERATURE: 97.8 F | BODY MASS INDEX: 27.13 KG/M2 | HEIGHT: 68 IN | SYSTOLIC BLOOD PRESSURE: 112 MMHG | DIASTOLIC BLOOD PRESSURE: 76 MMHG | HEART RATE: 57 BPM | WEIGHT: 179 LBS | RESPIRATION RATE: 16 BRPM

## 2019-11-20 DIAGNOSIS — I25.10 CORONARY ARTERY DISEASE INVOLVING NATIVE HEART WITHOUT ANGINA PECTORIS, UNSPECIFIED VESSEL OR LESION TYPE: ICD-10-CM

## 2019-11-20 DIAGNOSIS — G25.0 ESSENTIAL TREMOR: ICD-10-CM

## 2019-11-20 DIAGNOSIS — I35.0 AORTIC VALVE STENOSIS, ETIOLOGY OF CARDIAC VALVE DISEASE UNSPECIFIED: ICD-10-CM

## 2019-11-20 DIAGNOSIS — B35.3 TINEA PEDIS OF RIGHT FOOT: Primary | ICD-10-CM

## 2019-11-20 RX ORDER — PRIMIDONE 50 MG/1
150 TABLET ORAL
Qty: 270 TAB | Refills: 1 | Status: SHIPPED | OUTPATIENT
Start: 2019-11-20 | End: 2020-01-21

## 2019-11-20 RX ORDER — ITRACONAZOLE 100 MG/1
200 CAPSULE ORAL DAILY
Qty: 28 CAP | Refills: 0 | Status: SHIPPED | OUTPATIENT
Start: 2019-11-20 | End: 2019-11-20 | Stop reason: SDUPTHER

## 2019-11-20 RX ORDER — ITRACONAZOLE 100 MG/1
200 CAPSULE ORAL 2 TIMES DAILY
Qty: 28 CAP | Refills: 0 | Status: SHIPPED | OUTPATIENT
Start: 2019-11-20 | End: 2019-11-27

## 2019-11-20 RX ORDER — PRIMIDONE 50 MG/1
150 TABLET ORAL
Qty: 270 TAB | Refills: 1 | Status: SHIPPED | OUTPATIENT
Start: 2019-11-20 | End: 2019-11-20 | Stop reason: SDUPTHER

## 2019-11-20 NOTE — PROGRESS NOTES
Rigo Ferguson is a 80 y.o. male who presents today for Tremors; Cholesterol Problem; Overactive Bladder; and Foot Pain  . He has a history of   Patient Active Problem List   Diagnosis Code    OAB (overactive bladder) N32.81    Hyperlipidemia E78.5    History of melanoma Z85.820    Gastroesophageal reflux disease K21.9    Murmur R01.1    Essential tremor G25.0    Aortic valve stenosis I35.0    Coronary artery disease involving native heart without angina pectoris, unspecified vessel or lesion type I25.10   . Today patient is here for follow-up. .     CAD/AS-significant aortic stenosis. Patient did have a left heart cath which did show some irregularities. Decision has been made to proceed with percutaneous intervention and arthrectomy in early December. Then they will reevaluate him for valve replacement. He notes that his exercise tolerance overall is unchanged. Essential tremor: Patient had side effects with beta-blockers in the past.  This was starting to affect him quite a bit to where he could not write anymore. We did start him on primidone and he has titrated his dose up to 150mg. He notes that this has really helped. Able to write. .     Problem visit:  Rigo Ferguson is here for complaint of athlete's foot. Problem began 1 month(s) ago. Severity is moderate  Character of problem: Pain and irritation. Between the last 2 toes of right foot. He has been using Lotrimin for 2 weeks now      ROS  Review of Systems   Constitutional: Negative for chills, fever, malaise/fatigue and weight loss. HENT: Negative for congestion and sore throat. Eyes: Negative for blurred vision, double vision and photophobia. Respiratory: Negative for cough and shortness of breath. Cardiovascular: Negative for chest pain, palpitations and leg swelling. Gastrointestinal: Negative for abdominal pain, constipation, diarrhea, heartburn, nausea and vomiting.    Genitourinary: Negative for dysuria, frequency and urgency. Musculoskeletal: Negative for joint pain and myalgias. Skin: Negative for rash. Foot discomfort   Neurological: Positive for tremors (Greatly improved). Negative for headaches. Endo/Heme/Allergies: Does not bruise/bleed easily. Psychiatric/Behavioral: Negative for memory loss and suicidal ideas. Visit Vitals  /76 (BP 1 Location: Left arm, BP Patient Position: Sitting)   Pulse (!) 57   Temp 97.8 °F (36.6 °C) (Oral)   Resp 16   Ht 5' 8\" (1.727 m)   Wt 179 lb (81.2 kg)   SpO2 96%   BMI 27.22 kg/m²       Physical Exam  Constitutional:       Appearance: He is well-developed. He is not diaphoretic. HENT:      Head: Normocephalic and atraumatic. Eyes:      Pupils: Pupils are equal, round, and reactive to light. Neck:      Musculoskeletal: Normal range of motion and neck supple. Vascular: No JVD. Cardiovascular:      Rate and Rhythm: Normal rate and regular rhythm. Heart sounds: No murmur. Pulmonary:      Effort: Pulmonary effort is normal. No respiratory distress. Breath sounds: Normal breath sounds. No wheezing. Abdominal:      General: Bowel sounds are normal. There is no distension. Palpations: Abdomen is soft. Tenderness: There is no tenderness. Musculoskeletal: Normal range of motion. Skin:     General: Skin is warm and dry. Neurological:      Mental Status: He is alert and oriented to person, place, and time. Cranial Nerves: No cranial nerve deficit. Psychiatric:         Behavior: Behavior normal.           Current Outpatient Medications   Medication Sig    itraconazole (SPORONAX) 100 mg capsule Take 2 Caps by mouth two (2) times a day for 7 days.  primidone (MYSOLINE) 50 mg tablet Take 3 Tabs by mouth nightly for 90 days.  DETROL LA 4 mg ER capsule Take 1 Cap by mouth daily.  NIASPAN 500 mg tablet daily.     omeprazole (PRILOSEC) 20 mg capsule     simvastatin (ZOCOR) 40 mg tablet     Vit C-Vit E-Copper-ZnOx-Lutein (PRESERVISION LUTEIN) 226 mg-200 unit -5 mg-0.8 mg cap Take  by mouth.  magnesium 250 mg tab Take  by mouth.  COQ10, UBIQUINOL, PO Take  by mouth.  cholecalciferol (VITAMIN D3) 1,000 unit cap Take  by mouth daily.  cyanocobalamin 1,000 mcg tablet Take 1,000 mcg by mouth daily.  folic acid (FOLVITE) 1 mg tablet Take  by mouth daily.  raNITIdine (ZANTAC) 150 mg tablet Take 1 Tab by mouth nightly. No current facility-administered medications for this visit. Past Medical History:   Diagnosis Date    GERD (gastroesophageal reflux disease)     Hyperactivity of bladder     Hypercholesterolemia     Macular degeneration disease     Skin cancer (melanoma) (HCC)     Tremor       Past Surgical History:   Procedure Laterality Date    HX HERNIA REPAIR      HX MOHS PROCEDURES        Social History     Tobacco Use    Smoking status: Never Smoker    Smokeless tobacco: Never Used   Substance Use Topics    Alcohol use: Yes     Comment: beer or glass of wine nightly       Family History   Problem Relation Age of Onset    Cancer Father     Lung Cancer Father         smoker    Heart Disease Brother     Diabetes Brother     Diabetes Maternal Grandfather     Prostate Cancer Brother         Allergies   Allergen Reactions    Penicillins Hives        Assessment/Plan  Diagnoses and all orders for this visit:    1. Tinea pedis of right foot -patient is failed topical therapy. Will treat with itraconazole for 7 days. -     itraconazole (SPORONAX) 100 mg capsule; Take 2 Caps by mouth two (2) times a day for 7 days. 2. Essential tremor -patient has had drastic improvements with primidone. Continue 150 mg dose  -     primidone (MYSOLINE) 50 mg tablet; Take 3 Tabs by mouth nightly for 90 days. 3. Coronary artery disease involving native heart without angina pectoris, unspecified vessel or lesion type -scheduled for PCI in early December.   He will then proceed with valve replacement. 4. Aortic valve stenosis, etiology of cardiac valve disease unspecified -following with valve clinic. Julien Lopez MD  11/20/2019    This note was created with the help of speech recognition software [employer]) and may contain some 'sound alike' errors.

## 2019-11-20 NOTE — PATIENT INSTRUCTIONS
Athlete's Foot: Care Instructions Your Care Instructions Athlete's foot is an itchy rash on the foot caused by an infection with a fungus. You can get it by going barefoot in wet public areas, such as swimming pools or locker rooms. Many times there is no clear reason why you get athlete's foot. You can easily treat athlete's foot by putting medicine on your feet for 1 to 6 weeks. In some cases, a doctor may prescribe pills to kill the fungus. Follow-up care is a key part of your treatment and safety. Be sure to make and go to all appointments, and call your doctor if you are having problems. It's also a good idea to know your test results and keep a list of the medicines you take. How can you care for yourself at home? · Your doctor may suggest an over-the counter lotion or spray or may prescribe a medicine. Take your medicines exactly as prescribed. Call your doctor if you think you are having a problem with your medicine. · Keep your feet clean and dry. · When you get dressed, put your socks on before your underwear. This can prevent the fungus from spreading from your feet to your groin. To prevent athlete's foot · Wear flip-flops or other shower sandals in public locker rooms and showers and by the pool. · Dry between your toes after swimming or bathing. · Wear leather shoes or sandals, which let air get to your feet. · Change your socks as needed so your feet stay as dry as possible. · Use antifungal powder on your feet. When should you call for help? Watch closely for changes in your health, and be sure to contact your doctor if: 
  · You do not get better as expected. Where can you learn more? Go to http://julienne-beth.info/. Enter M498 in the search box to learn more about \"Athlete's Foot: Care Instructions. \" Current as of: April 1, 2019 Content Version: 12.2 © 6846-3147 [employer].  Care instructions adapted under license by [address] (which disclaims liability or warranty for this information). If you have questions about a medical condition or this instruction, always ask your healthcare professional. [address] any warranty or liability for your use of this information.

## 2019-11-21 NOTE — TELEPHONE ENCOUNTER
Returned patient's call no answer, LM/Vm to call office in regards to below message per Dr Cindy Rachel. Cath arranged Dec 4 Wed 1 PM at [facility]. Pt informed. Plz call him with instructions.  Thx

## 2019-11-27 DIAGNOSIS — R06.09 DYSPNEA ON EXERTION: Primary | ICD-10-CM

## 2019-11-27 RX ORDER — SODIUM CHLORIDE 9 MG/ML
75 INJECTION, SOLUTION INTRAVENOUS CONTINUOUS
Status: CANCELLED | OUTPATIENT
Start: 2019-12-04

## 2019-11-27 RX ORDER — SODIUM CHLORIDE 0.9 % (FLUSH) 0.9 %
5-40 SYRINGE (ML) INJECTION EVERY 8 HOURS
Status: CANCELLED | OUTPATIENT
Start: 2019-12-04

## 2019-11-27 RX ORDER — SODIUM CHLORIDE 0.9 % (FLUSH) 0.9 %
5-40 SYRINGE (ML) INJECTION AS NEEDED
Status: CANCELLED | OUTPATIENT
Start: 2019-12-04

## 2019-11-27 NOTE — TELEPHONE ENCOUNTER
Spoke with pt concerning LAD Arterectomy w/PCI procedure. Instructions given to pt per VO Dr. Andrew Andrew. Pt. NPO after 0700 am the day of hte proceduret; Patient will be staying overnight from the procedure pack a bag. Procedure is scheduled for 1 pm at [facility] on 12/4/19. Patient advised to arrive 2 hrs prior to procedure for prep. Patient verbalized understanding. Opportunities for questions, clarifications, and concerns provided.

## 2019-12-02 ENCOUNTER — TELEPHONE (OUTPATIENT)
Dept: INTERNAL MEDICINE CLINIC | Age: 84
End: 2019-12-02

## 2019-12-02 NOTE — TELEPHONE ENCOUNTER
----- Message from [place]azackery Belle sent at 12/2/2019  2:50 PM EST -----  Regarding: Dr. Martha Strange Refill  Contact: [phone]  Caller (if not patient):N/A  Relationship of caller (if not patient): N/A  Best contact number(s): [phone]  Name of medication and dosage if known: \"Rantidine\" 150 MG  Is patient out of this medication (yes/no): Yes  Pharmacy name: on file. Pharmacy listed in chart? (yes/no): Yes  Pharmacy phone number: N/A  Date of last visit: 11/20/19  Details to clarify the request: Pt. received information about recall on medication through mail. Pt. looking for supplemental to current medication that is not on recall.

## 2019-12-03 RX ORDER — FAMOTIDINE 20 MG/1
20 TABLET, FILM COATED ORAL
Qty: 90 TAB | Refills: 1 | Status: SHIPPED | OUTPATIENT
Start: 2019-12-03 | End: 2020-01-14 | Stop reason: ALTCHOICE

## 2019-12-03 NOTE — TELEPHONE ENCOUNTER
Change to Pepcid 20 mg nightly. This was sent into pharmacy. Patient can obtain over-the-counter until mail order is sent in.

## 2019-12-04 ENCOUNTER — HOSPITAL ENCOUNTER (OUTPATIENT)
Age: 84
Setting detail: OBSERVATION
Discharge: HOME | End: 2019-12-05
Attending: INTERNAL MEDICINE | Admitting: INTERNAL MEDICINE
Payer: MEDICARE

## 2019-12-04 DIAGNOSIS — I42.9: ICD-10-CM

## 2019-12-04 DIAGNOSIS — R06.09 DYSPNEA ON EXERTION: ICD-10-CM

## 2019-12-04 PROBLEM — I25.10 CAD (CORONARY ATHEROSCLEROTIC DISEASE): Status: ACTIVE | Noted: 2019-12-04

## 2019-12-04 PROBLEM — I25.10 CAD (CORONARY ARTERY DISEASE): Status: ACTIVE | Noted: 2019-12-04

## 2019-12-04 LAB
ACT BLD: 208 SECS (ref 79–138)
ACT BLD: 257 SECS (ref 79–138)
ACT BLD: 318 SECS (ref 79–138)
ANION GAP SERPL CALC-SCNC: 7 MMOL/L (ref 5–15)
BUN SERPL-MCNC: 19 MG/DL (ref 6–20)
BUN/CREAT SERPL: 23 (ref 12–20)
CALCIUM SERPL-MCNC: 9 MG/DL (ref 8.5–10.1)
CHLORIDE SERPL-SCNC: 102 MMOL/L (ref 97–108)
CO2 SERPL-SCNC: 31 MMOL/L (ref 21–32)
CREAT SERPL-MCNC: 0.82 MG/DL (ref 0.7–1.3)
ERYTHROCYTE [DISTWIDTH] IN BLOOD BY AUTOMATED COUNT: 13.2 % (ref 11.5–14.5)
GLUCOSE SERPL-MCNC: 68 MG/DL (ref 65–100)
HCT VFR BLD AUTO: 47.8 % (ref 36.6–50.3)
HGB BLD-MCNC: 15.2 G/DL (ref 12.1–17)
INR PPP: 1.2 (ref 0.9–1.1)
MCH RBC QN AUTO: 30.4 PG (ref 26–34)
MCHC RBC AUTO-ENTMCNC: 31.8 G/DL (ref 30–36.5)
MCV RBC AUTO: 95.6 FL (ref 80–99)
NRBC # BLD: 0 K/UL (ref 0–0.01)
NRBC BLD-RTO: 0 PER 100 WBC
PLATELET # BLD AUTO: 125 K/UL (ref 150–400)
PMV BLD AUTO: 11.5 FL (ref 8.9–12.9)
POTASSIUM SERPL-SCNC: 3.7 MMOL/L (ref 3.5–5.1)
PROTHROMBIN TIME: 11.7 SEC (ref 9–11.1)
RBC # BLD AUTO: 5 M/UL (ref 4.1–5.7)
SODIUM SERPL-SCNC: 140 MMOL/L (ref 136–145)
WBC # BLD AUTO: 5.2 K/UL (ref 4.1–11.1)

## 2019-12-04 PROCEDURE — C1874 STENT, COATED/COV W/DEL SYS: HCPCS | Performed by: INTERNAL MEDICINE

## 2019-12-04 PROCEDURE — C1887 CATHETER, GUIDING: HCPCS | Performed by: INTERNAL MEDICINE

## 2019-12-04 PROCEDURE — 85027 COMPLETE CBC AUTOMATED: CPT

## 2019-12-04 PROCEDURE — 85610 PROTHROMBIN TIME: CPT

## 2019-12-04 PROCEDURE — 99218 HC RM OBSERVATION: CPT

## 2019-12-04 PROCEDURE — 92933 PRQ TRLML C ATHRC ST ANGIOP1: CPT | Performed by: INTERNAL MEDICINE

## 2019-12-04 PROCEDURE — 99153 MOD SED SAME PHYS/QHP EA: CPT | Performed by: INTERNAL MEDICINE

## 2019-12-04 PROCEDURE — 77030013715 HC INFL SYS MRTM -B: Performed by: INTERNAL MEDICINE

## 2019-12-04 PROCEDURE — 74011250637 HC RX REV CODE- 250/637: Performed by: INTERNAL MEDICINE

## 2019-12-04 PROCEDURE — 80048 BASIC METABOLIC PNL TOTAL CA: CPT

## 2019-12-04 PROCEDURE — 77030010221 HC SPLNT WR POS TELE -B: Performed by: INTERNAL MEDICINE

## 2019-12-04 PROCEDURE — C1769 GUIDE WIRE: HCPCS | Performed by: INTERNAL MEDICINE

## 2019-12-04 PROCEDURE — 85347 COAGULATION TIME ACTIVATED: CPT

## 2019-12-04 PROCEDURE — 36415 COLL VENOUS BLD VENIPUNCTURE: CPT

## 2019-12-04 PROCEDURE — 74011250636 HC RX REV CODE- 250/636: Performed by: INTERNAL MEDICINE

## 2019-12-04 PROCEDURE — C1724 CATH, TRANS ATHEREC,ROTATION: HCPCS | Performed by: INTERNAL MEDICINE

## 2019-12-04 PROCEDURE — 74011636320 HC RX REV CODE- 636/320: Performed by: INTERNAL MEDICINE

## 2019-12-04 PROCEDURE — 77030013744: Performed by: INTERNAL MEDICINE

## 2019-12-04 PROCEDURE — C1725 CATH, TRANSLUMIN NON-LASER: HCPCS | Performed by: INTERNAL MEDICINE

## 2019-12-04 PROCEDURE — 77030039046 HC PAD DEFIB RADIOTRNSPNT CNMD -B: Performed by: INTERNAL MEDICINE

## 2019-12-04 PROCEDURE — 74011000250 HC RX REV CODE- 250: Performed by: INTERNAL MEDICINE

## 2019-12-04 PROCEDURE — 93454 CORONARY ARTERY ANGIO S&I: CPT | Performed by: INTERNAL MEDICINE

## 2019-12-04 PROCEDURE — 93005 ELECTROCARDIOGRAM TRACING: CPT

## 2019-12-04 PROCEDURE — 77030019569 HC BND COMPR RAD TERU -B: Performed by: INTERNAL MEDICINE

## 2019-12-04 PROCEDURE — 99152 MOD SED SAME PHYS/QHP 5/>YRS: CPT | Performed by: INTERNAL MEDICINE

## 2019-12-04 PROCEDURE — 77030012468 HC VLV BLEEDBK CNTRL ABBT -B: Performed by: INTERNAL MEDICINE

## 2019-12-04 PROCEDURE — C1894 INTRO/SHEATH, NON-LASER: HCPCS | Performed by: INTERNAL MEDICINE

## 2019-12-04 DEVICE — XIENCE SIERRA™ EVEROLIMUS ELUTING CORONARY STENT SYSTEM 3.00 MM X 38 MM / RAPID-EXCHANGE
Type: IMPLANTABLE DEVICE | Status: FUNCTIONAL
Brand: XIENCE SIERRA™

## 2019-12-04 RX ORDER — CLOPIDOGREL BISULFATE 75 MG/1
75 TABLET ORAL DAILY
Status: DISCONTINUED | OUTPATIENT
Start: 2019-12-05 | End: 2019-12-05 | Stop reason: HOSPADM

## 2019-12-04 RX ORDER — VERAPAMIL HYDROCHLORIDE 2.5 MG/ML
INJECTION, SOLUTION INTRAVENOUS AS NEEDED
Status: DISCONTINUED | OUTPATIENT
Start: 2019-12-04 | End: 2019-12-04 | Stop reason: HOSPADM

## 2019-12-04 RX ORDER — SODIUM CHLORIDE 0.9 % (FLUSH) 0.9 %
5-40 SYRINGE (ML) INJECTION AS NEEDED
Status: DISCONTINUED | OUTPATIENT
Start: 2019-12-04 | End: 2019-12-05 | Stop reason: HOSPADM

## 2019-12-04 RX ORDER — OMEPRAZOLE 20 MG/1
20 CAPSULE, DELAYED RELEASE ORAL DAILY
Status: DISCONTINUED | OUTPATIENT
Start: 2019-12-05 | End: 2019-12-04 | Stop reason: CLARIF

## 2019-12-04 RX ORDER — HEPARIN SODIUM 1000 [USP'U]/ML
INJECTION, SOLUTION INTRAVENOUS; SUBCUTANEOUS AS NEEDED
Status: DISCONTINUED | OUTPATIENT
Start: 2019-12-04 | End: 2019-12-04 | Stop reason: HOSPADM

## 2019-12-04 RX ORDER — PRIMIDONE 50 MG/1
150 TABLET ORAL
Status: DISCONTINUED | OUTPATIENT
Start: 2019-12-04 | End: 2019-12-05 | Stop reason: HOSPADM

## 2019-12-04 RX ORDER — SODIUM CHLORIDE 0.9 % (FLUSH) 0.9 %
5-40 SYRINGE (ML) INJECTION EVERY 8 HOURS
Status: DISCONTINUED | OUTPATIENT
Start: 2019-12-04 | End: 2019-12-05 | Stop reason: HOSPADM

## 2019-12-04 RX ORDER — CLOPIDOGREL BISULFATE 75 MG/1
75 TABLET ORAL DAILY
Status: DISCONTINUED | OUTPATIENT
Start: 2019-12-05 | End: 2019-12-04 | Stop reason: SDUPTHER

## 2019-12-04 RX ORDER — LIDOCAINE HYDROCHLORIDE 10 MG/ML
INJECTION INFILTRATION; PERINEURAL AS NEEDED
Status: DISCONTINUED | OUTPATIENT
Start: 2019-12-04 | End: 2019-12-04 | Stop reason: HOSPADM

## 2019-12-04 RX ORDER — SODIUM CHLORIDE 9 MG/ML
500 INJECTION, SOLUTION INTRAVENOUS CONTINUOUS
Status: DISCONTINUED | OUTPATIENT
Start: 2019-12-04 | End: 2019-12-04

## 2019-12-04 RX ORDER — FENTANYL CITRATE 50 UG/ML
INJECTION, SOLUTION INTRAMUSCULAR; INTRAVENOUS AS NEEDED
Status: DISCONTINUED | OUTPATIENT
Start: 2019-12-04 | End: 2019-12-04 | Stop reason: HOSPADM

## 2019-12-04 RX ORDER — ASPIRIN 81 MG/1
81 TABLET ORAL DAILY
Status: DISCONTINUED | OUTPATIENT
Start: 2019-12-05 | End: 2019-12-04 | Stop reason: SDUPTHER

## 2019-12-04 RX ORDER — PANTOPRAZOLE SODIUM 40 MG/1
40 TABLET, DELAYED RELEASE ORAL
Status: DISCONTINUED | OUTPATIENT
Start: 2019-12-05 | End: 2019-12-05 | Stop reason: HOSPADM

## 2019-12-04 RX ORDER — SIMVASTATIN 40 MG/1
40 TABLET, FILM COATED ORAL
Status: DISCONTINUED | OUTPATIENT
Start: 2019-12-04 | End: 2019-12-05 | Stop reason: HOSPADM

## 2019-12-04 RX ORDER — MIDAZOLAM HYDROCHLORIDE 1 MG/ML
INJECTION, SOLUTION INTRAMUSCULAR; INTRAVENOUS AS NEEDED
Status: DISCONTINUED | OUTPATIENT
Start: 2019-12-04 | End: 2019-12-04 | Stop reason: HOSPADM

## 2019-12-04 RX ORDER — SODIUM CHLORIDE 0.9 % (FLUSH) 0.9 %
5-40 SYRINGE (ML) INJECTION EVERY 8 HOURS
Status: DISCONTINUED | OUTPATIENT
Start: 2019-12-04 | End: 2019-12-04 | Stop reason: HOSPADM

## 2019-12-04 RX ORDER — SODIUM CHLORIDE 9 MG/ML
75 INJECTION, SOLUTION INTRAVENOUS CONTINUOUS
Status: DISCONTINUED | OUTPATIENT
Start: 2019-12-04 | End: 2019-12-04 | Stop reason: HOSPADM

## 2019-12-04 RX ORDER — CLOPIDOGREL 300 MG/1
TABLET, FILM COATED ORAL AS NEEDED
Status: DISCONTINUED | OUTPATIENT
Start: 2019-12-04 | End: 2019-12-04 | Stop reason: HOSPADM

## 2019-12-04 RX ORDER — SODIUM CHLORIDE 0.9 % (FLUSH) 0.9 %
5-40 SYRINGE (ML) INJECTION AS NEEDED
Status: DISCONTINUED | OUTPATIENT
Start: 2019-12-04 | End: 2019-12-04 | Stop reason: HOSPADM

## 2019-12-04 RX ORDER — ATORVASTATIN CALCIUM 40 MG/1
40 TABLET, FILM COATED ORAL
Status: DISCONTINUED | OUTPATIENT
Start: 2019-12-04 | End: 2019-12-04

## 2019-12-04 RX ORDER — GUAIFENESIN 100 MG/5ML
81 LIQUID (ML) ORAL DAILY
Status: DISCONTINUED | OUTPATIENT
Start: 2019-12-05 | End: 2019-12-05 | Stop reason: HOSPADM

## 2019-12-04 RX ORDER — HEPARIN SODIUM 200 [USP'U]/100ML
INJECTION, SOLUTION INTRAVENOUS
Status: COMPLETED | OUTPATIENT
Start: 2019-12-04 | End: 2019-12-04

## 2019-12-04 RX ADMIN — SODIUM CHLORIDE 75 ML/HR: 900 INJECTION, SOLUTION INTRAVENOUS at 12:00

## 2019-12-04 RX ADMIN — SIMVASTATIN 40 MG: 40 TABLET, FILM COATED ORAL at 22:12

## 2019-12-04 RX ADMIN — Medication 10 ML: at 22:16

## 2019-12-04 RX ADMIN — PRIMIDONE 150 MG: 50 TABLET ORAL at 22:11

## 2019-12-04 NOTE — PROGRESS NOTES
Verbal report given to Teagan(name) on NAME  being transferred to [facility](unit) for routine progression of care       Report consisted of patients Situation, Background, Assessment and   Recommendations(SBAR). Information from the following report(s) Procedure Summary, MAR and Recent Results was reviewed with the receiving nurse. Lines:       Opportunity for questions and clarification was provided.       Patient transported with:   Monitor  Registered Nurse

## 2019-12-04 NOTE — PROGRESS NOTES
1125 -   Cardiac Cath Lab Recovery Arrival Note:      Milagro Mendez arrived to Cardiac Cath Lab, Recovery Area. Staff introduced to patient. Patient identifiers verified with NAME and DATE OF BIRTH. Procedure verified with patient. Consent forms reviewed and signed by patient or authorized representative and verified. Allergies verified. Patient and family oriented to department. Patient and family informed of procedure and plan of care. Questions answered with review. Patient prepped for procedure, per orders from physician, prior to arrival.    Patient on cardiac monitor, non-invasive blood pressure, SPO2 monitor. On room air. Patient is A&Ox 4. Patient reports no complaints. Patient in stretcher, in low position, with side rails up, call bell within reach, patient instructed to call if assistance as needed. Patient prep in: [address], [place] [address]. Family in: waiting room.    Prep by: Richa Gold RN

## 2019-12-04 NOTE — Clinical Note
Single view of the left main, left coronary artery and circumflex artery obtained using power injection.

## 2019-12-04 NOTE — PROCEDURES
Findings:  1)Severe mid LAD calcified disease  2)S/p PCI with 3 by 38 mm Xience EFFIE placement following Rotational atherectomy with 1.75 mm [place]. Post dilatation with 3.25 mm Bballoon at 20-24 KYRA. Good final result. Moderate distal disease deferred. Access: Right radial--no issues    Contrast 110 cc    Recommendations: DAPT with Plavix for 6 months.

## 2019-12-04 NOTE — Clinical Note
Lesion located in the Mid LAD. Balloon inserted. Balloon inflated using multiple inflations inflation technique. Pressure = 8 mis; Duration = 30 sec. Inflation 2: Pressure: 12 mis; Duration: 20 sec. Inflation 3: Pressure: 10 mis; Duration: 10 sec.

## 2019-12-04 NOTE — PROGRESS NOTES
1435 -   Cardiac Cath Lab Procedure Area Arrival Note:    Nikki Kapoor arrived to Cardiac Cath Lab, Procedure Area. Patient identifiers verified with NAME and DATE OF BIRTH. Procedure verified with patient. Consent forms verified. Allergies verified. Patient informed of procedure and plan of care. Questions answered with review. Patient voiced understanding of procedure and plan of care. Patient on cardiac monitor, non-invasive blood pressure, SPO2 monitor. Placed on  O2 @ 3 lpm via NC.  IV of NS on pump at 75 ml/hr. Patient status doing well without problems. Patient is A&Ox 4. Patient reports no discomfort at this time. Patient medicated during procedure with orders obtained and verified by Dr. Verna Alexis. Refer to patients Cardiac Cath Lab PROCEDURE REPORT for vital signs, assessment, status, and response during procedure, printed at end of case. Printed report on chart or scanned into chart. 1630 - TRANSFER - OUT REPORT:    Verbal report given to Janis BACA(name) on Nikki Kapoor  being transferred to (unit) for routine post - op       Report consisted of patients Situation, Background, Assessment and   Recommendations(SBAR). Information from the following report(s) Procedure Summary and MAR was reviewed with the receiving nurse. Lines:   Peripheral IV 12/04/19 Left Arm (Active)   Site Assessment Clean, dry, & intact; Ecchymotic (bruised) 12/4/2019 12:00 PM   Phlebitis Assessment 0 12/4/2019 12:00 PM   Infiltration Assessment 0 12/4/2019 12:00 PM   Dressing Status Clean, dry, & intact 12/4/2019 12:00 PM   Dressing Type Transparent;Tape 12/4/2019 12:00 PM   Hub Color/Line Status Blue; Infusing 12/4/2019 12:00 PM   Action Taken Open ports on tubing capped 12/4/2019 12:00 PM   Alcohol Cap Used Yes 12/4/2019 12:00 PM        Opportunity for questions and clarification was provided.       Patient transported with:   Registered Nurse

## 2019-12-04 NOTE — Clinical Note
Lesion: Located in the Mid LAD. Stent inserted. Stent deployed. Single technique used. First inflation pressure = 16 mis; inflation time: 60 sec.

## 2019-12-04 NOTE — PROGRESS NOTES
1640 - TRANSFER - IN REPORT:    Verbal report received from PHUONG Askew on Willy Son  being received from procedure for routine progression of care. Report consisted of patients Situation, Background, Assessment and Recommendations(SBAR). Information from the following report(s) Procedure Summary was reviewed with the receiving clinician. Opportunity for questions and clarification was provided. Assessment completed upon patients arrival to [address] [place] [address] care [facility]. Cardiac Cath Lab Recovery Arrival Note:    Willy Son arrived to [facility] recovery area. Patient procedure= cardiac catheterization & stent placement. Patient on cardiac monitor, non-invasive blood pressure, SPO2 monitor. On room air. IV  of 0.9% normal saline on pump at 75 ml/hr. Patient status doing well without problems. Patient is A&Ox 4. Patient reports no complaints. PROCEDURE SITE CHECK:    Procedure site:without any bleeding or hematoma, no pain/discomfort reported at procedure site. TR band in place on right wrist.    No change in patient status. Continue to monitor patient and status. EKG obtained. 1700-  Report given to Almita Peterson RN.

## 2019-12-04 NOTE — Clinical Note
Lesion located in the Mid LAD. Balloon inserted. Balloon inflated using multiple inflations inflation technique. Pressure = 20 mis; Duration = 30 sec. Inflation 2: Pressure: 20 mis; Duration: 30 sec. Inflation 3: Pressure: 20 mis; Duration: 25 sec. Inflation 4: Pressure: 20 mis; Duration: 30 sec. Inflation 5: Pressure: 20 mis; Duration: 30 sec.

## 2019-12-05 ENCOUNTER — TELEPHONE (OUTPATIENT)
Dept: CARDIOLOGY CLINIC | Age: 84
End: 2019-12-05

## 2019-12-05 VITALS
SYSTOLIC BLOOD PRESSURE: 116 MMHG | DIASTOLIC BLOOD PRESSURE: 75 MMHG | HEIGHT: 67 IN | WEIGHT: 176.37 LBS | HEART RATE: 60 BPM | BODY MASS INDEX: 27.68 KG/M2 | TEMPERATURE: 97.5 F | OXYGEN SATURATION: 98 % | RESPIRATION RATE: 16 BRPM

## 2019-12-05 LAB
ATRIAL RATE: 72 BPM
CALCULATED P AXIS, ECG09: 8 DEGREES
CALCULATED R AXIS, ECG10: -30 DEGREES
CALCULATED T AXIS, ECG11: 45 DEGREES
DIAGNOSIS, 93000: NORMAL
P-R INTERVAL, ECG05: 164 MS
Q-T INTERVAL, ECG07: 460 MS
QRS DURATION, ECG06: 110 MS
QTC CALCULATION (BEZET), ECG08: 503 MS
VENTRICULAR RATE, ECG03: 72 BPM

## 2019-12-05 PROCEDURE — 99218 HC RM OBSERVATION: CPT

## 2019-12-05 PROCEDURE — 74011250637 HC RX REV CODE- 250/637: Performed by: INTERNAL MEDICINE

## 2019-12-05 RX ORDER — ASPIRIN 81 MG/1
81 TABLET ORAL DAILY
Qty: 90 TAB | Refills: 3 | Status: SHIPPED | OUTPATIENT
Start: 2019-12-05 | End: 2019-12-05 | Stop reason: SDUPTHER

## 2019-12-05 RX ORDER — ASPIRIN 81 MG/1
81 TABLET ORAL DAILY
Qty: 90 TAB | Refills: 3 | Status: SHIPPED | OUTPATIENT
Start: 2019-12-05 | End: 2020-01-22

## 2019-12-05 RX ORDER — CLOPIDOGREL BISULFATE 75 MG/1
75 TABLET ORAL DAILY
Qty: 90 TAB | Refills: 3 | Status: SHIPPED | OUTPATIENT
Start: 2019-12-06 | End: 2019-12-05

## 2019-12-05 RX ORDER — ATORVASTATIN CALCIUM 20 MG/1
40 TABLET, FILM COATED ORAL DAILY
Qty: 180 TAB | Refills: 3 | Status: SHIPPED | OUTPATIENT
Start: 2019-12-05 | End: 2019-12-05 | Stop reason: SDUPTHER

## 2019-12-05 RX ORDER — ATORVASTATIN CALCIUM 20 MG/1
40 TABLET, FILM COATED ORAL DAILY
Qty: 180 TAB | Refills: 3 | Status: SHIPPED | OUTPATIENT
Start: 2019-12-05 | End: 2020-06-23

## 2019-12-05 RX ORDER — CLOPIDOGREL BISULFATE 75 MG/1
75 TABLET ORAL DAILY
Qty: 90 TAB | Refills: 3 | Status: SHIPPED | OUTPATIENT
Start: 2019-12-06 | End: 2020-06-23

## 2019-12-05 RX ADMIN — CLOPIDOGREL BISULFATE 75 MG: 75 TABLET ORAL at 08:31

## 2019-12-05 RX ADMIN — Medication 10 ML: at 07:31

## 2019-12-05 RX ADMIN — ASPIRIN 81 MG 81 MG: 81 TABLET ORAL at 08:31

## 2019-12-05 RX ADMIN — PANTOPRAZOLE SODIUM 40 MG: 40 TABLET, DELAYED RELEASE ORAL at 07:31

## 2019-12-05 NOTE — DISCHARGE SUMMARY
Cardiology Discharge Summary     Patient ID:  Farideh Mancia  [id number]  80 y.o.  1934    Admit Date: 12/4/2019    Discharge Date: 12/5/2019     Admitting Physician: Danielito Baxter MD     Discharge Physician: Danielito Baxter MD    Admission Diagnoses: Myocardial disease, primary [facility]) [I42.8];CAD (coronary artery disease) [I25.10];CAD (coronary atherosclerotic disease) [I25.10]    Discharge Diagnoses: Active Problems:    CAD (coronary artery disease) (12/4/2019)      CAD (coronary atherosclerotic disease) (12/4/2019)        Discharge Condition:Stable    Cardiology Procedures this Admission: PCI of LAD    Hospital Course:   Hospital Course: Admittted after high risk PCI for overnight observation. Initiation on Atorvastatin. Marginal BP for ACEi/BB. Plan to proceed with TAVR with valve clinic visit next week. Consults: None  Visit Vitals  /75 (BP 1 Location: Right arm, BP Patient Position: Head of bed elevated (Comment degrees))   Pulse 60   Temp 97.5 °F (36.4 °C)   Resp 16   Ht 5' 7\" (1.702 m)   Wt 176 lb 5.9 oz (80 kg)   SpO2 98%   BMI 27.62 kg/m²       Discharge Exam:     Visit Vitals  /75 (BP 1 Location: Right arm, BP Patient Position: Head of bed elevated (Comment degrees))   Pulse 60   Temp 97.5 °F (36.4 °C)   Resp 16   Ht 5' 7\" (1.702 m)   Wt 176 lb 5.9 oz (80 kg)   SpO2 98%   BMI 27.62 kg/m²     General Appearance:  Well developed, well nourished,alert and oriented x 3, and individual in no acute distress. Ears/Nose/Mouth/Throat:   Hearing grossly normal.         Neck: Supple. Chest:   Lungs clear to auscultation bilaterally. Cardiovascular:  Regular rate and rhythm, S1, S2 normal, no murmur. Abdomen:   Soft, non-tender, bowel sounds are active. Extremities: No edema bilaterally. Skin: Warm and dry.              Recent Results (from the past 24 hour(s))   POC ACTIVATED CLOTTING TIME    Collection Time: 12/04/19  3:34 PM   Result Value Ref Range    Activated Clotting Time (POC) 318 (H) 79 - 138 SECS   POC ACTIVATED CLOTTING TIME    Collection Time: 12/04/19  4:02 PM   Result Value Ref Range    Activated Clotting Time (POC) 208 (H) 79 - 138 SECS   POC ACTIVATED CLOTTING TIME    Collection Time: 12/04/19  4:11 PM   Result Value Ref Range    Activated Clotting Time (POC) 257 (H) 79 - 138 SECS   EKG, 12 LEAD, INITIAL    Collection Time: 12/04/19  4:50 PM   Result Value Ref Range    Ventricular Rate 72 BPM    Atrial Rate 72 BPM    P-R Interval 164 ms    QRS Duration 110 ms    Q-T Interval 460 ms    QTC Calculation (Bezet) 503 ms    Calculated P Axis 8 degrees    Calculated R Axis -30 degrees    Calculated T Axis 45 degrees    Diagnosis       Sinus rhythm with premature atrial complexes  Left axis deviation  Nonspecific ST abnormality      No previous ECGs available  Confirmed by Diane Gordon M.D., Dianah Quant ([id number]) on 12/5/2019 11:50:24 AM         Disposition:Home    Not on BB ACEi/ARB dueto marginla BP       Patient Instructions:   Current Discharge Medication List      START taking these medications    Details   clopidogrel (PLAVIX) 75 mg tab Take 1 Tab by mouth daily for 360 days. Qty: 90 Tab, Refills: 3      aspirin delayed-release 81 mg tablet Take 1 Tab by mouth daily for 360 days. Qty: 90 Tab, Refills: 3      atorvastatin (LIPITOR) 20 mg tablet Take 2 Tabs by mouth daily for 360 days. Qty: 180 Tab, Refills: 3         CONTINUE these medications which have NOT CHANGED    Details   famotidine (PEPCID) 20 mg tablet Take 1 Tab by mouth nightly. Qty: 90 Tab, Refills: 1      primidone (MYSOLINE) 50 mg tablet Take 3 Tabs by mouth nightly for 90 days. Qty: 270 Tab, Refills: 1    Associated Diagnoses: Essential tremor      DETROL LA 4 mg ER capsule Take 1 Cap by mouth daily. Qty: 90 Cap, Refills: 1      NIASPAN 500 mg tablet daily.       omeprazole (PRILOSEC) 20 mg capsule       Vit C-Vit E-Copper-ZnOx-Lutein (PRESERVISION LUTEIN) 226 mg-200 unit -5 mg-0.8 mg cap Take  by mouth.      magnesium 250 mg tab Take  by mouth. COQ10, UBIQUINOL, PO Take  by mouth. cholecalciferol (VITAMIN D3) 1,000 unit cap Take  by mouth daily. cyanocobalamin 1,000 mcg tablet Take 1,000 mcg by mouth daily. folic acid (FOLVITE) 1 mg tablet Take  by mouth daily. STOP taking these medications       simvastatin (ZOCOR) 40 mg tablet Comments:   Reason for Stopping:               Referenced discharge instructions provided by nursing for diet and activity.     Follow-up with me in 1 week    Signed:  Seth Brizuela MD  12/5/2019  12:04 PM

## 2019-12-05 NOTE — PROGRESS NOTES
KEVNI Plan:    Patient discharging home today; wife to transport    Observation notice provided in writing to patient and/or caregiver as well as verbal explanation of the policy. Patients who are in outpatient status also receive the Observation notice.       Francis Joy MPH

## 2019-12-05 NOTE — CARDIO/PULMONARY
Cardiac Rehab: CAD education folder given to [employer]. Educated using teach back method. Reviewed CAD diagnosis definition and purpose of intervention. Discussed risk factors for CAD to include the following: family history, elevated BMI, hyperlipidemia, hypertension, diabetes, stress, and smoking. Discussed Heart Healthy/Low Sodium (2000 mg) diet. Reviewed the importance of medication compliance, the purpose of PLAVIX, and potential side effects. Discussed follow up appointments with cardiologist, signs and symptoms of angina, and what to report to physician after discharge. Emphasized the value of cardiac rehab. Discussed Cardiac Rehab Program format, benefits, and encouraged enrollment to assist with risk modification and management. [employer] will be back for possible TAVR in January 2020 so will refer to [facility] after that procedure. [employer] verbalized understanding with questions answered.  Ashley Garcia RN

## 2019-12-05 NOTE — PROGRESS NOTES
0730: Bedside shift change report given to Iesha Saunders (oncoming nurse) by Gael Alston RN (offgoing nurse). Report included the following information SBAR and Kardex. 1320: I have reviewed discharge instructions with the patient. The patient verbalized understanding. Discharge Medication List as of 12/5/2019 12:30 PM      START taking these medications    Details   clopidogrel (PLAVIX) 75 mg tab Take 1 Tab by mouth daily for 360 days. , Normal, Disp-90 Tab, R-3      aspirin delayed-release 81 mg tablet Take 1 Tab by mouth daily for 360 days. , Normal, Disp-90 Tab, R-3      atorvastatin (LIPITOR) 20 mg tablet Take 2 Tabs by mouth daily for 360 days. , Normal, Disp-180 Tab, R-3         CONTINUE these medications which have NOT CHANGED    Details   famotidine (PEPCID) 20 mg tablet Take 1 Tab by mouth nightly., Normal, Disp-90 Tab, R-1      primidone (MYSOLINE) 50 mg tablet Take 3 Tabs by mouth nightly for 90 days. , Normal, Disp-270 Tab, R-1      DETROL LA 4 mg ER capsule Take 1 Cap by mouth daily. , Normal, Disp-90 Cap, R-1, SAMARIA      NIASPAN 500 mg tablet daily. , Historical Med, SAMARIA      omeprazole (PRILOSEC) 20 mg capsule Historical Med      Vit C-Vit E-Copper-ZnOx-Lutein (PRESERVISION LUTEIN) 226 mg-200 unit -5 mg-0.8 mg cap Take  by mouth., Historical Med      magnesium 250 mg tab Take  by mouth., Historical Med      COQ10, UBIQUINOL, PO Take  by mouth., Historical Med      cholecalciferol (VITAMIN D3) 1,000 unit cap Take  by mouth daily. , Historical Med      cyanocobalamin 1,000 mcg tablet Take 1,000 mcg by mouth daily. , Historical Med      folic acid (FOLVITE) 1 mg tablet Take  by mouth daily. , Historical Med         STOP taking these medications       simvastatin (ZOCOR) 40 mg tablet Comments:   Reason for Stopping:             Problem: Falls - Risk of  Goal: *Absence of Falls  Description  Document Phuong Patches Fall Risk and appropriate interventions in the flowsheet.   Outcome: Progressing Towards Goal  Note: Fall Risk Interventions:            Medication Interventions: Teach patient to arise slowly, Patient to call before getting OOB                   Problem: Patient Education: Go to Patient Education Activity  Goal: Patient/Family Education  Outcome: Progressing Towards Goal

## 2019-12-05 NOTE — TELEPHONE ENCOUNTER
Pt states that he was discharged from the hospital today and that he was prescribed atorvastatin and that he is currently taking simvastatin and that he isn't sure if the atorvastatin is to replace simvastatin  or if he should proceed with taking both. Please advise.       Phone: [phone]

## 2019-12-05 NOTE — PROGRESS NOTES
Bedside and Verbal shift change report given to [address] (oncoming nurse) by Gay Messer (offgoing nurse).  Report included the following information SBAR, Kardex, Intake/Output, MAR, Recent Results and Cardiac Rhythm SR.

## 2019-12-05 NOTE — TELEPHONE ENCOUNTER
Returned patient's call advised according to Dr Reece Rosas notes he would like for him to start the Atorvastatin/Lipitor 40 mg and to stop the Simvastatin/Zocor 40 mg. Patient verbalized understanding.

## 2019-12-05 NOTE — DISCHARGE INSTRUCTIONS
Percutaneous Coronary Intervention: What to Expect at [facility]    Percutaneous coronary intervention (PCI) is the name for procedures that are used to open a narrowed or blocked coronary artery. The two most common PCI procedures are coronary angioplasty and coronary stent placement. Your groin or arm may have a bruise and feel sore for a day or two after a percutaneous coronary intervention (PCI). You can do light activities around the house, but nothing strenuous for several days. This care sheet gives you a general idea about how long it will take for you to recover. But each person recovers at a different pace. Follow the steps below to get better as quickly as possible. How can you care for yourself at home? Activity    · If the doctor gave you a sedative:  ? For 24 hours, don't do anything that requires attention to detail, such as going to work, making important decisions, or signing any legal documents. It takes time for the medicine's effects to completely wear off.  ? For your safety, do not drive or operate any machinery that could be dangerous. Wait until the medicine wears off and you can think clearly and react easily.     · Do not do strenuous exercise and do not lift, pull, or push anything heavy until your doctor says it is okay. This may be for a day or two. You can walk around the house and do light activity, such as cooking.     · If the catheter was placed in your groin, try not to walk up stairs for the first couple of days.     · If the catheter was placed in your arm near your wrist, do not bend your wrist deeply for the first couple of days. Be careful using your hand to get into and out of a chair or bed.     · Carry your stent identification card with you at all times.     · If your doctor recommends it, get more exercise. Walking is a good choice. Bit by bit, increase the amount you walk every day. Try for at least 30 minutes on most days of the week.    Diet    · Drink plenty of fluids to help your body flush out the dye. If you have kidney, heart, or liver disease and have to limit fluids, talk with your doctor before you increase the amount of fluids you drink.     · Keep eating a heart-healthy diet that has lots of fruits, vegetables, and whole grains. If you have not been eating this way, talk to your doctor. You also may want to talk to a dietitian. This expert can help you to learn about healthy foods and plan meals. Medicines    · Your doctor will tell you if and when you can restart your medicines. He or she will also give you instructions about taking any new medicines.     · If you take blood thinners, such as warfarin (Coumadin), clopidogrel (Plavix), or aspirin, be sure to talk to your doctor. He or she will tell you if and when to start taking those medicines again. Make sure that you understand exactly what your doctor wants you to do.     · Your doctor will prescribe blood-thinning medicines. You will likely take aspirin plus another antiplatelet, such as clopidogrel (Plavix). It is very important that you take these medicines exactly as directed. These medicines help keep the coronary artery open and reduce your risk of a heart attack.     · Call your doctor if you think you are having a problem with your medicine.    Care of the catheter site    · For 1 or 2 days, keep a bandage over the spot where the catheter was inserted. The bandage probably will fall off in this time.     · Put ice or a cold pack on the area for 10 to 20 minutes at a time to help with soreness or swelling. Put a thin cloth between the ice and your skin.     · You may shower 24 to 48 hours after the procedure, if your doctor okays it. Pat the incision dry.     · Do not soak the catheter site until it is healed. Don't take a bath for 1 week, or until your doctor tells you it is okay.     · Watch for bleeding from the site.  A small amount of blood (up to the size of a quarter) on the bandage can be normal.     · If you are bleeding, lie down and press on the area for 15 minutes to try to make it stop. If the bleeding does not stop, call your doctor or seek immediate medical care. Follow-up care is a key part of your treatment and safety. Be sure to make and go to all appointments, and call your doctor if you are having problems. It's also a good idea to know your test results and keep a list of the medicines you take. When should you call for help? Call 911 anytime you think you may need emergency care. For example, call if:    · You passed out (lost consciousness).     · You have severe trouble breathing.     · You have sudden chest pain and shortness of breath, or you cough up blood.     · You have symptoms of a heart attack, such as:  ? Chest pain or pressure. ? Sweating. ? Shortness of breath. ? Nausea or vomiting. ? Pain that spreads from the chest to the neck, jaw, or one or both shoulders or arms. ? Dizziness or lightheadedness. ? A fast or uneven pulse. After calling 911, chew 1 adult-strength aspirin. Wait for an ambulance. Do not try to drive yourself.     · You have been diagnosed with angina, and you have angina symptoms that do not go away with rest or are not getting better within 5 minutes after you take one dose of nitroglycerin.    Call your doctor now or seek immediate medical care if:    · You are bleeding from the area where the catheter was put in your artery.     · You have a fast-growing, painful lump at the catheter site.     · You have signs of infection, such as:  ? Increased pain, swelling, warmth, or redness. ? Red streaks leading from the catheter site. ? Pus draining from the catheter site. ? A fever.     · Your leg, arm, or hand is painful, looks blue, or feels cold, numb, or tingly.    Watch closely for changes in your health, and be sure to contact your doctor if you have any problems. Where can you learn more?   Go to http://julienne-beth.info/. Enter B918 in the search box to learn more about \"Percutaneous Coronary Intervention: What to Expect at Home. \"  Current as of: April 9, 2019  Content Version: 12.2  © 9468-3524 [employer]. Care instructions adapted under license by [employer] (which disclaims liability or warranty for this information). If you have questions about a medical condition or this instruction, always ask your healthcare professional. [address] any warranty or liability for your use of this information. Patient Education        Learning About Percutaneous Coronary Intervention  What is percutaneous coronary intervention? Percutaneous coronary intervention (PCI) is the name for procedures to open a blocked coronary artery. The two most common are coronary angioplasty and coronary stent placement. A PCI is a way to open a blocked coronary artery before, during, or after a heart attack. It gets blood flowing to your heart. And it can help prevent heart problems by widening an artery that has been narrowed by fatty buildup (plaque). This also may be called balloon angioplasty. Before a PCI, a doctor does a test to find blocked arteries. The test is called cardiac catheterization. The doctor puts a tiny tube called a catheter into an artery in your groin or arm. The doctor moves the catheter through the artery to your heart. Then he or she puts a dye into the catheter. This makes your heart's arteries show up on a screen so the doctor can see any blockages. The test also can measure the pressure inside your heart's chambers. If you have a blocked artery, the doctor may do an angioplasty or a coronary stent procedure. In an angioplasty, the doctor uses a catheter with a tiny balloon at the tip. He or she puts it into the blocked area and inflates it. The balloon presses the plaque against the walls of the artery.  This makes more room for blood to flow. In most cases, the doctor then puts a stent in the artery. A stent is a small, expandable tube that presses against the walls of the artery. The stent is left in the artery to keep the artery open. This helps blood flow. It also may keep small pieces of plaque from breaking off and causing a heart attack. How is PCI done? PCI is done in a cardiac catheterization laboratory (\"cath lab\"). It is done by a heart specialist called a cardiologist. The whole procedure may take 1½ to 3 hours. You lie on a table under a large X-ray machine. You will get medicine through an IV in one of your veins. It helps you relax and not feel pain. You will be awake during the procedure. But you may not be able to remember much about it. The doctor will inject some medicine into your arm or groin to numb the skin. You will feel a small needle stick. It's like having a blood test. You may feel some pressure when the doctor puts in the catheter. But you will not feel pain. The doctor will look at X-ray pictures on a monitor (like a TV set) to move the catheter to your heart. You may feel warm or flushed for a short time when the doctor injects dye into your artery. The doctor then will inflate a tiny balloon at the end of the catheter. The balloon is inflated for a brief time. Then it is deflated and removed. The doctor also may use the catheter to put a stent in the artery. What can you expect after PCI? The catheter will be removed. A nurse or doctor may press on a bandage on the opening. Then a bandage or a compression device may be placed on your groin or wrist at the catheter insertion site. This prevents bleeding. After the test, you will be taken to a room where the catheter site and your heart rate, blood pressure, and temperature will be checked several times. If the catheter was put in your groin, you will need to lie still and keep your leg straight for several hours.  If the catheter was put in your arm, you may be able to sit up and get out of bed right away. But you will need to keep your arm still for at least one hour. You may stay 1 night in the hospital. When you go home, you will get instructions from your doctor to help you recover well and prevent problems. Make sure to drink plenty of fluids (unless your doctor tells you not to) for several hours after the test. This will help flush the dye out of your body. Your doctor will prescribe blood-thinning medicines. You will likely take aspirin plus another blood thinner. It is very important that you take these medicines exactly as directed. They help keep the coronary artery open and reduce your risk of a heart attack. If you have this procedure, you will still need to make lifestyle changes like eating healthy, being active, and not smoking. This will give you the best chance for a longer, healthier life. Follow-up care is a key part of your treatment and safety. Be sure to make and go to all appointments, and call your doctor if you are having problems. It's also a good idea to know your test results and keep a list of the medicines you take. Where can you learn more? Go to http://julienne-beth.info/. Enter K661 in the search box to learn more about \"Learning About Percutaneous Coronary Intervention. \"  Current as of: April 9, 2019  Content Version: 12.2  © 1964-8719 [employer]. Care instructions adapted under license by [employer] (which disclaims liability or warranty for this information). If you have questions about a medical condition or this instruction, always ask your healthcare professional. [place] [address] any warranty or liability for your use of this information.

## 2019-12-05 NOTE — ROUTINE PROCESS
1800: report given by shante from cath lab. Patient with small hematoma under TR band. Dr. Lul Mendoza aware. Will keep air in for 1 hour and then slowly remove. continue to monitor. 1930: Bedside shift change report given to Yang (oncoming nurse) by Marlena Medina RN (offgoing nurse). Report included the following information SBAR and Kardex.

## 2019-12-11 ENCOUNTER — OFFICE VISIT (OUTPATIENT)
Dept: CARDIOLOGY CLINIC | Age: 84
End: 2019-12-11

## 2019-12-11 VITALS
WEIGHT: 177.5 LBS | HEART RATE: 75 BPM | SYSTOLIC BLOOD PRESSURE: 120 MMHG | OXYGEN SATURATION: 95 % | BODY MASS INDEX: 27.86 KG/M2 | DIASTOLIC BLOOD PRESSURE: 82 MMHG | TEMPERATURE: 97.6 F | HEIGHT: 67 IN

## 2019-12-11 DIAGNOSIS — G25.0 ESSENTIAL TREMOR: ICD-10-CM

## 2019-12-11 DIAGNOSIS — K21.9 GASTROESOPHAGEAL REFLUX DISEASE, ESOPHAGITIS PRESENCE NOT SPECIFIED: ICD-10-CM

## 2019-12-11 DIAGNOSIS — I35.0 AORTIC VALVE STENOSIS, ETIOLOGY OF CARDIAC VALVE DISEASE UNSPECIFIED: Primary | ICD-10-CM

## 2019-12-11 DIAGNOSIS — N32.81 OAB (OVERACTIVE BLADDER): ICD-10-CM

## 2019-12-11 DIAGNOSIS — E78.5 HYPERLIPIDEMIA, UNSPECIFIED HYPERLIPIDEMIA TYPE: ICD-10-CM

## 2019-12-11 DIAGNOSIS — I25.10 CORONARY ARTERY DISEASE INVOLVING NATIVE CORONARY ARTERY OF NATIVE HEART WITHOUT ANGINA PECTORIS: ICD-10-CM

## 2019-12-11 DIAGNOSIS — Z95.5 S/P CORONARY ARTERY STENT PLACEMENT: ICD-10-CM

## 2019-12-11 RX ORDER — LANOLIN ALCOHOL/MO/W.PET/CERES
250 CREAM (GRAM) TOPICAL DAILY
COMMUNITY

## 2019-12-11 NOTE — PROGRESS NOTES
Patient: Melissa Guzman   Age: 80 y.o. Patient Care Team:  Kvng Lopez MD as PCP - General (Internal Medicine)  Kvng Lopez MD as PCP - [facility]  Terence Wong MD as Physician (Ophthalmology)  Baeu Talley MD (Cardiology)    PCP: Kvng Lopez MD    Cardiologist: Danielle Palomo    Diagnosis/Reason for Consultation: The primary encounter diagnosis was Aortic valve stenosis, etiology of cardiac valve disease unspecified. Diagnoses of Coronary artery disease involving native coronary artery of native heart without angina pectoris, S/P coronary artery stent placement, Hyperlipidemia, unspecified hyperlipidemia type, Essential tremor, OAB (overactive bladder), and Gastroesophageal reflux disease, esophagitis presence not specified were also pertinent to this visit. Problem List:   Patient Active Problem List   Diagnosis Code    OAB (overactive bladder) N32.81    Hyperlipidemia E78.5    History of melanoma Z85.820    Gastroesophageal reflux disease K21.9    Murmur R01.1    Essential tremor G25.0    Aortic valve stenosis I35.0    Coronary artery disease involving native heart without angina pectoris, unspecified vessel or lesion type I25.10    CAD (coronary artery disease) I25.10    CAD (coronary atherosclerotic disease) I25.10         HPI: 80 y.o.  male with PMHx of AS, CAD s/p LAD atheretcomy & EFFIE (12/4/2019), GERD, Essential tremor, & OAB that is referred to the [address] [place] [address] by Dr. Kaley Payne for interventional evaluation of AS. Mr. John Mcneal reports a long standing cardiac murmur that has been followed serially by his cardiologist and now reports a decline in his activity tolerance and progressive THEODORE. He used to walk 3-4 miles/day and now walking 1-2 miles takes him longer than twice the distance used to. He reports SOB/THEODORE that limits his walks and causes him to stop/rest frequently.   He denies any CP, chest tightness, palpitaitons, lightheadedness, syncope, falls, CHF admissions, LE edema, orthopnea or PND. He reports a normal appetite and routine BMs w/o any blood/blackness/tarriness. His only recent medication changes have been the addition of clopdiogrel after his EFFIE in early December and his mysoline in November with improvement in tremors. He denies any previous chest surgery/XRT/trauma. He is accompanied by his wife today but is independent in every way and does not use any DME. NYHA Classification: II   Class II (Mild): Slight limitation of physical activity. Comfortable at rest, but ordinary physical activity results in fatigue, palpitation, or dyspnea. Angina Classification: 0   Class 0: No symptoms      Past Medical History:   Diagnosis Date    GERD (gastroesophageal reflux disease)     Hyperactivity of bladder     Hypercholesterolemia     Macular degeneration disease     Skin cancer (melanoma) (HCC)     Tremor      Endocarditis: no  Pulmonary HTN:  no Greater than 2/3 systemic: N/A  Immunocompromised/Steroids: no  Heart Failure Admission w/in past year:  no  Heart Failure Admission w/in past 2 weeks: no  Liver Dz/Cirrhosis: no  If yes, MELD score:  N/A    Past Surgical History:   Procedure Laterality Date    HX HERNIA REPAIR      HX MOHS PROCEDURES        Social History     Tobacco Use    Smoking status: Never Smoker    Smokeless tobacco: Never Used   Substance Use Topics    Alcohol use: Yes     Comment: beer or glass of wine nightly       Family History   Problem Relation Age of Onset    Cancer Father     Lung Cancer Father         smoker    Heart Disease Brother     Diabetes Brother     Diabetes Maternal Grandfather     Prostate Cancer Brother      Prior to Admission medications    Medication Sig Start Date End Date Taking? Authorizing Provider   magnesium oxide (MAG-OX) 400 mg tablet Take 400 mg by mouth daily.    Yes Provider, Historical   aspirin delayed-release 81 mg tablet Take 1 Tab by mouth daily for 360 days. 12/5/19 11/29/20 Yes Nadira Markham, DEBBIE   clopidogrel (PLAVIX) 75 mg tab Take 1 Tab by mouth daily for 360 days. 12/6/19 11/30/20 Yes Nadira Markham, DEBBIE   atorvastatin (LIPITOR) 20 mg tablet Take 2 Tabs by mouth daily for 360 days. 12/5/19 11/29/20 Yes Nadira Markham, DEBBIE   famotidine (PEPCID) 20 mg tablet Take 1 Tab by mouth nightly. 12/3/19  Yes Randi Daly MD   primidone (MYSOLINE) 50 mg tablet Take 3 Tabs by mouth nightly for 90 days. 11/20/19 2/18/20 Yes Randi Daly MD   DETROL LA 4 mg ER capsule Take 1 Cap by mouth daily. 11/1/19  Yes Randi Daly MD   NIASPAN 500 mg tablet daily. 9/25/19  Yes Provider, Historical   omeprazole (PRILOSEC) 20 mg capsule  9/7/19  Yes Provider, Historical   Vit C-Vit E-Copper-ZnOx-Lutein (PRESERVISION LUTEIN) 226 mg-200 unit -5 mg-0.8 mg cap Take  by mouth. Yes Provider, Historical   COQ10, UBIQUINOL, PO Take  by mouth. Yes Provider, Historical   cholecalciferol (VITAMIN D3) 1,000 unit cap Take  by mouth daily. Yes Provider, Historical   cyanocobalamin 1,000 mcg tablet Take 1,000 mcg by mouth daily. Yes Provider, Historical   folic acid (FOLVITE) 1 mg tablet Take  by mouth daily. Yes Provider, Historical   magnesium 250 mg tab Take  by mouth. Provider, Historical       Allergies   Allergen Reactions    Penicillins Hives       Current Medications:   Current Outpatient Medications   Medication Sig Dispense Refill    magnesium oxide (MAG-OX) 400 mg tablet Take 400 mg by mouth daily.  aspirin delayed-release 81 mg tablet Take 1 Tab by mouth daily for 360 days. 90 Tab 3    clopidogrel (PLAVIX) 75 mg tab Take 1 Tab by mouth daily for 360 days. 90 Tab 3    atorvastatin (LIPITOR) 20 mg tablet Take 2 Tabs by mouth daily for 360 days. 180 Tab 3    famotidine (PEPCID) 20 mg tablet Take 1 Tab by mouth nightly. 90 Tab 1    primidone (MYSOLINE) 50 mg tablet Take 3 Tabs by mouth nightly for 90 days.  270 Tab 1    DETROL LA 4 mg ER capsule Take 1 Cap by mouth daily. 90 Cap 1    NIASPAN 500 mg tablet daily.  omeprazole (PRILOSEC) 20 mg capsule       Vit C-Vit E-Copper-ZnOx-Lutein (PRESERVISION LUTEIN) 226 mg-200 unit -5 mg-0.8 mg cap Take  by mouth.  COQ10, UBIQUINOL, PO Take  by mouth.  cholecalciferol (VITAMIN D3) 1,000 unit cap Take  by mouth daily.  cyanocobalamin 1,000 mcg tablet Take 1,000 mcg by mouth daily.  folic acid (FOLVITE) 1 mg tablet Take  by mouth daily.  magnesium 250 mg tab Take  by mouth. Vitals: Blood pressure 120/82, pulse 75, temperature 97.6 °F (36.4 °C), temperature source Oral, height 5' 7\" (1.702 m), weight 177 lb 8 oz (80.5 kg), SpO2 95 %. Allergies: is allergic to penicillins. Review of Systems: Pertinent Positives per HPI   [x]Total of 13 systems reviewed as follows:  Constitutional: Negative fever, negative chills  Eyes:   Negative for amauroses fugax  ENT:   Negative sore throat,oral absecess  Endocrine Negative for thyroid replacement Rx; goiter; DM  Respiratory:  Negative chronic cough,sputum production  Cards:   Negative for palpitations, lower extremity edema, varicosities, claudication  GI:   Negative for dysphagia, bleeding, nausea, vomiting, diarrhea, and abdominal pain  Genitourinary: Negative for frequency, dysuria  Integument:  Negative for rash and pruritus  Hematologic:  Negative for easy bruising; bleeding dyscarsia  Musculoskel: Negative for muscle weakness inhibiting ambulation  Neurological:  Negative for stroke, TIA, syncope, dizziness  Behavl/Psych: Negative for feelings of anxiety, depression     Cardiovascular Testing:   EKG 12/5/2019: SR 72 bpm. No blocks/axis deviations.  msec. QRSD 110 msec    TTE 10/23/2019:  Interpretation Summary   · Left Ventricle: Normal cavity size and systolic function (ejection fraction normal). Mildly increased wall thickness. Estimated left ventricular ejection fraction is 56 - 60%.  No regional wall motion abnormality noted. Mild (grade 1) left ventricular diastolic dysfunction. · Mitral Valve: Mitral annular calcification. Mild mitral valve regurgitation is present. · Left Atrium: Dilated left atrium. · Tricuspid Valve: Mild tricuspid valve regurgitation is present. · Aortic Valve: Severe aortic valve sclerosis with reduced excursion. Aortic valve leaflet calcification present with reduced excursion. Severe aortic valve stenosis is present. Mild to moderate aortic valve regurgitation is present. · Aortic Valve Systolic Mean Gradient 56 mm Hg; AV peak gradient ( pedhoff) -74 mm Hg   Echo Findings   Left Ventricle Normal cavity size and systolic function (ejection fraction normal). Mildly increased wall thickness. The estimated ejection fraction is 56 - 60%. No regional wall motion abnormality noted. There is mild (grade 1) left ventricular diastolic dysfunction. Left Atrium Dilated left atrium. No atrial septal defect present. Right Ventricle Normal cavity size, wall thickness and global systolic function. Right Atrium Normal cavity size. Aortic Valve Severe aortic valve sclerosis with reduced excursion. There is leaflet calcification with reduced excursion. There is severe aortic stenosis. Mild to moderate aortic valve regurgitation. Mitral Valve No stenosis. Leaflet calcification. Mitral annular calcification. Mild regurgitation. Tricuspid Valve Normal valve structure and no stenosis. Mild tricuspid valve regurgitation. Pulmonary arterial systolic pressure is 76.3 mmHg. Pulmonic Valve Pulmonic valve not well visualized, but normal doppler findings. No stenosis. Aorta Normal aortic root. IVC/Hepatic Veins Normal structure. Pericardium No evidence of pericardial effusion.      Aorta Measurements   Aorta   Measurement Value (Range)   Ao Root D 3.39 cm             Aortic Valve Measurements   Stenosis   Aortic Valve Systolic Peak Velocity 044.27 cm/s      AoV PG 74.5 mmHg      Aortic Valve Systolic Mean Gradient 89.8 mmHg      AoV .39 cm      Aortic Valve Area by Continuity of VTI 0.5 cm2      Aortic Valve Area by Continuity of Peak Velocity 0.5 cm2       Regurgitation/PISA   Aortic Regurgitant Pressure Half-time 781.1 cm      AR Max Franco 457.77 cm/s       LVOT   LVOT Peak Velocity 69.45 cm/s      LVOT Peak Gradient 1.9 mmHg      LVOT VTI 17.97 cm      LVOT d 2.07 cm             Mitral Valve Measurements   Regurgitation   Mitral Regurgitant Peak Velocity 525.71 cm/s      MR Peak Gradient 110.5 mmHg               Tricuspid Valve Measurements   Stenosis   Est. RA Pressure 3 mmHg      PASP 37.8 mmHg      RVSP 37.8 mmHg       Regurgitation   Triscuspid Valve Regurgitation Peak Gradient 34.8 mmHg      TR Max Velocity 295.17 cm/s              Pulmonary Measurements   Stenosis/Regurgitation   PV peak gradient 2 mmHg      Pulmonic Valve Max Velocity 70.39 cm/s              Diastolic Filling/Shunts   Diastolic Filling   Mitral Valve E Wave Deceleration Time 462.1 ms      MV E Franco 46.24 cm/s      MV A Franco 70.58 cm/s      MV E/A 0.7        Shunt   LVOT SV 60.4 ml              Cardiac catheterization 11/6/2019: L Main: Large; ML   LAD: Prox - large; Mid/Distal Med; Ostial 30%; Mid - Ca+++; 80% ( haziness prob sec to Ca+); Another mid lesion - 70% ; Small to med D1- Prox 30%; Small  D2; Large tortuous septal  LCflex: Med to large; MLI; OM1 - Med to large; Prox 50%  RCA: Med to large; Prox/Mid - MLI; Distal at bifurcation 60-70%- small PDA/PLB  LVEDP: valve not crossed  LVEF: not assessed  No significant gradient across aortic valve. RHC findings:  RAPm= 4      mmHg  RVSP= 19    mmHg  PAPm= 14       mmHg  PCWPm=7    mmHg  CO=   6.02       l/min  CI=3.08     Cardiac Cath 12/4/2019:Diagnostic   Dominance: Right   Left Anterior Descending   Mid LAD lesion 80% stenosed. . The plaque feature of this lesion is calcified.    Intervention   Mid LAD lesion   Atherectomy   Rotational atherectomy was performed Pass #: 4. Lovell size was 1.75 mm. 165 RPM FOR 35  RPM FOR 24  RPM FOR 33  RPM FOR 27 SEC   Supplies used: ROTABLATOR SYS ROTLNK + 1.75MM --    Stent   Stent was successfully placed. Post-Intervention Lesion Assessment   The pre-interventional distal flow is decreased (JENNIFER 2). Post-intervention JENNIFER flow is 3. There were no complications. There is a 0% residual stenosis post intervention. Carotid Dopplers 11/5/2019:   1)  1-49% stenosis of the right internal carotid artery (low end of range). 2)  1-49% stenosis of the left internal carotid artery (low end of range). 3)  Vertebral arteries are patent with antegrade flow. PFTs: FEV1/Predicted:  none    Gated C/A/P CTA 11/12/2019: FINDINGS: The thoracic aorta is normal in caliber and measures 32 x 31 mm at the level of the main pulmonary artery. Descending thoracic aorta measures 26 x 27 mm at the level of the main pulmonary artery. Aorta measures 21 x 29 mm at the level of the diaphragmatic hiatus. Distal abdominal aorta measures 6 16 x 19 mm in size. Right internal iliac artery 11 x 9 mm in size. There is no abdominal aortic aneurysm. There is mild atherosclerotic plaque along the anterior margin of the abdominal aorta. Celiac origin is without evidence of hemodynamically significant stenosis. SMA is patent as well. NIRMAL origin is patent. Mild atherosclerotic change in the renal arteries which are single bilaterally. No hemodynamically significant stenosis. Mild tortuosity of the common iliac artery on the left. No common iliac artery aneurysm. Femoral arteries measure 11 x 11 mm at the level of the femoral heads. Aortic annulus area 4.4 cm. Average diameter 25 mm. Common origin of the innominate and left common carotid artery. The left vertebral artery arises directly from the aortic arch. The right vertebral artery is dominant. Right thyroid hypodensity does not warrant further imaging follow-up.  Scattered chronic interstitial and parenchymal changes in the pulmonary parenchyma. Minimal atelectatic change. Cardiomegaly. Coronary artery disease. Enlarged pulmonary arteries consistent with pulmonary arterial hypertension. There is no axillary hilar or mediastinal adenopathy. There is no pleural effusion. LIVER: Hepatic cysts. There is no intrahepatic duct dilatation. The CBD is not dilated. There is no hepatic parenchymal mass. Hepatic enhancement pattern is within normal limits. Portal vein is patent. GALLBLADDER:  No dilatation or wall thickening. SPLEEN/PANCREAS: No mass. There is no pancreatic duct dilatation. There is no evidence of splenomegaly. ADRENALS/KIDNEYS: Minimal renal cortical thinning on the right and on the left. There is no hydronephrosis. There is no renal or ureteral calculus. There is no renal mass. There is no perinephric mass. COLON AND SMALL BOWEL: Fecal stasis There is no free intraperitoneal air. There is no evidence of incarceration or obstruction. No mesenteric adenopathy. PERITONEUM: Unremarkable. APPENDIX: Unremarkable. BLADDER/REPRODUCTIVE ORGANS: No mass or calculus. OSSEOUS STRUCTURES: Multilevel degenerative change in the lumbar spine. RETROPERITONEUM: Unremarkable. The abdominal aorta is normal in caliber. No aneurysm. No retroperitoneal adenopathy. The images will be sent to [facility] for additional post processing and measurements of the aortic valve annulus and coronary sinuses. IMPRESSION:  Preoperative examination for aortic valve repair. Physical Exam:  General: Thin well groomed elderly gentleman appearing stated age accompanied by his wife  Neuro: A&OX3. MCNAIR. PERRL. Steady un-ssisted gait  Head:Normocephalic. Atraumatic. Symmetrical  Neck: Trachea Midline  Resp: CTA B. No Adv BS/cough/sputum/tachypnea with seated conversation  CV: S1S2 RRR. [place] IV/VI. No JVD/carotid bruits. Pink/warm/dry extremities. No LE peripheral edema  GI:Benign ab. Soft. NT/ND.  Active BS  : Voids  Integ: No obvious s/s of infection or breakdown  Musculo/Skeletal: FROM in all major joints. Good muscle tone    Clinic Evaluation:   KCCQ-12: scanned into EMR    5 meter gait: 12.51 seconds    Frality Survey:  Deborra Vani Index ADL - 6/6  scanned into EMR     STS 2.9 Risk Score / Predicted 30 day mortality: - calculations scanned into EMR  [facility]iac [facility] Database Version   2.9 RISK SCORES Procedure: Isolated AVR CALCULATE   Risk of Mortality:  1.592%    Renal Failure:  1.391%    Permanent Stroke:  1.464%    Prolonged Ventilation:  4.280%    DSW Infection:  0.103%    Reoperation:  3.804%    Morbidity or Mortality:  8.498%    Short Length of Stay:  38.126%    Long Length of Stay:  4.463%     Assessment/Plan:   1. AS - severe and symptomatic AS. At least an intermediate risk surgical risk d/t frailty. Risks/benefits of TAVR reviewed w/ pt and wife and they wish to proceed. R TF 26 S3 with MAC  2. CAD s/p LAD atheretcomy & EFFIE (12/4/2019) - ASA/Clopidgrel/BB per cards  3. GERD - H2B per PCP  4. Essential tremor - primidone with improvement in tremors per PCP  5. OAB - Detrol LA per urology  6.  Further plan/care by Dr. Andrew Matos

## 2019-12-11 NOTE — PROGRESS NOTES
Pt seen and examined  Images, cath and echo reviewed  Discussed with Dr Selvin Rojo   Full note reviewed and confirmed   Discussed TAVR including indications and risks  He is intermediate risk for SAVR

## 2019-12-12 DIAGNOSIS — I35.0 AORTIC VALVE STENOSIS, ETIOLOGY OF CARDIAC VALVE DISEASE UNSPECIFIED: Primary | ICD-10-CM

## 2019-12-17 ENCOUNTER — HOSPITAL ENCOUNTER (OUTPATIENT)
Dept: PREADMISSION TESTING | Age: 84
Discharge: HOME | DRG: 267 | End: 2019-12-17
Attending: NURSE PRACTITIONER
Payer: MEDICARE

## 2019-12-17 ENCOUNTER — HOSPITAL ENCOUNTER (OUTPATIENT)
Dept: GENERAL RADIOLOGY | Age: 84
Discharge: HOME | DRG: 267 | End: 2019-12-17
Attending: NURSE PRACTITIONER
Payer: MEDICARE

## 2019-12-17 ENCOUNTER — HOSPITAL ENCOUNTER (OUTPATIENT)
Dept: PULMONOLOGY | Age: 84
Discharge: HOME | DRG: 267 | End: 2019-12-17
Attending: NURSE PRACTITIONER
Payer: MEDICARE

## 2019-12-17 VITALS — WEIGHT: 178.79 LBS | BODY MASS INDEX: 27.1 KG/M2 | TEMPERATURE: 96.9 F | HEIGHT: 68 IN

## 2019-12-17 DIAGNOSIS — I35.0 AORTIC VALVE STENOSIS, ETIOLOGY OF CARDIAC VALVE DISEASE UNSPECIFIED: ICD-10-CM

## 2019-12-17 LAB
ALBUMIN SERPL-MCNC: 3.5 G/DL (ref 3.5–5)
ALBUMIN/GLOB SERPL: 1.1 {RATIO} (ref 1.1–2.2)
ALP SERPL-CCNC: 91 U/L (ref 45–117)
ALT SERPL-CCNC: 25 U/L (ref 12–78)
ANION GAP SERPL CALC-SCNC: 4 MMOL/L (ref 5–15)
APPEARANCE UR: CLEAR
APTT PPP: 31 SEC (ref 22.1–32)
ARTERIAL PATENCY WRIST A: YES
AST SERPL-CCNC: 19 U/L (ref 15–37)
ATRIAL RATE: 60 BPM
BACTERIA URNS QL MICRO: NEGATIVE /HPF
BASE EXCESS BLD CALC-SCNC: 2 MMOL/L
BASOPHILS # BLD: 0 K/UL (ref 0–0.1)
BASOPHILS NFR BLD: 1 % (ref 0–1)
BDY SITE: ABNORMAL
BILIRUB SERPL-MCNC: 0.4 MG/DL (ref 0.2–1)
BILIRUB UR QL: NEGATIVE
BNP SERPL-MCNC: 762 PG/ML
BUN SERPL-MCNC: 24 MG/DL (ref 6–20)
BUN/CREAT SERPL: 29 (ref 12–20)
CALCIUM SERPL-MCNC: 8.7 MG/DL (ref 8.5–10.1)
CALCULATED R AXIS, ECG10: -19 DEGREES
CALCULATED T AXIS, ECG11: 27 DEGREES
CHLORIDE SERPL-SCNC: 105 MMOL/L (ref 97–108)
CO2 SERPL-SCNC: 29 MMOL/L (ref 21–32)
COLOR UR: NORMAL
CREAT SERPL-MCNC: 0.83 MG/DL (ref 0.7–1.3)
DIAGNOSIS, 93000: NORMAL
DIFFERENTIAL METHOD BLD: ABNORMAL
EOSINOPHIL # BLD: 0.3 K/UL (ref 0–0.4)
EOSINOPHIL NFR BLD: 6 % (ref 0–7)
EPITH CASTS URNS QL MICRO: NORMAL /LPF
ERYTHROCYTE [DISTWIDTH] IN BLOOD BY AUTOMATED COUNT: 13.2 % (ref 11.5–14.5)
EST. AVERAGE GLUCOSE BLD GHB EST-MCNC: 108 MG/DL
GAS FLOW.O2 O2 DELIVERY SYS: ABNORMAL L/MIN
GLOBULIN SER CALC-MCNC: 3.3 G/DL (ref 2–4)
GLUCOSE SERPL-MCNC: 96 MG/DL (ref 65–100)
GLUCOSE UR STRIP.AUTO-MCNC: NEGATIVE MG/DL
HBA1C MFR BLD: 5.4 % (ref 4–5.6)
HCO3 BLD-SCNC: 26.5 MMOL/L (ref 22–26)
HCT VFR BLD AUTO: 43.8 % (ref 36.6–50.3)
HGB BLD-MCNC: 14.1 G/DL (ref 12.1–17)
HGB UR QL STRIP: NEGATIVE
HYALINE CASTS URNS QL MICRO: NORMAL /LPF (ref 0–5)
IMM GRANULOCYTES # BLD AUTO: 0 K/UL (ref 0–0.04)
IMM GRANULOCYTES NFR BLD AUTO: 1 % (ref 0–0.5)
INR PPP: 1.2 (ref 0.9–1.1)
KETONES UR QL STRIP.AUTO: NEGATIVE MG/DL
LEUKOCYTE ESTERASE UR QL STRIP.AUTO: NEGATIVE
LYMPHOCYTES # BLD: 1.1 K/UL (ref 0.8–3.5)
LYMPHOCYTES NFR BLD: 24 % (ref 12–49)
MAGNESIUM SERPL-MCNC: 2.1 MG/DL (ref 1.6–2.4)
MCH RBC QN AUTO: 30.2 PG (ref 26–34)
MCHC RBC AUTO-ENTMCNC: 32.2 G/DL (ref 30–36.5)
MCV RBC AUTO: 93.8 FL (ref 80–99)
MONOCYTES # BLD: 0.5 K/UL (ref 0–1)
MONOCYTES NFR BLD: 11 % (ref 5–13)
NEUTS SEG # BLD: 2.5 K/UL (ref 1.8–8)
NEUTS SEG NFR BLD: 57 % (ref 32–75)
NITRITE UR QL STRIP.AUTO: NEGATIVE
NRBC # BLD: 0 K/UL (ref 0–0.01)
NRBC BLD-RTO: 0 PER 100 WBC
O2/TOTAL GAS SETTING VFR VENT: 21 %
P-R INTERVAL, ECG05: 144 MS
PCO2 BLD: 42.1 MMHG (ref 35–45)
PH BLD: 7.41 [PH] (ref 7.35–7.45)
PH UR STRIP: 6.5 [PH] (ref 5–8)
PLATELET # BLD AUTO: 129 K/UL (ref 150–400)
PMV BLD AUTO: 10.5 FL (ref 8.9–12.9)
PO2 BLD: 82 MMHG (ref 80–100)
POTASSIUM SERPL-SCNC: 3.8 MMOL/L (ref 3.5–5.1)
PROT SERPL-MCNC: 6.8 G/DL (ref 6.4–8.2)
PROT UR STRIP-MCNC: NEGATIVE MG/DL
PROTHROMBIN TIME: 11.7 SEC (ref 9–11.1)
Q-T INTERVAL, ECG07: 528 MS
QRS DURATION, ECG06: 108 MS
QTC CALCULATION (BEZET), ECG08: 528 MS
RBC # BLD AUTO: 4.67 M/UL (ref 4.1–5.7)
RBC #/AREA URNS HPF: NORMAL /HPF (ref 0–5)
SAO2 % BLD: 96 % (ref 92–97)
SODIUM SERPL-SCNC: 138 MMOL/L (ref 136–145)
SP GR UR REFRACTOMETRY: 1.02 (ref 1–1.03)
SPECIMEN TYPE: ABNORMAL
THERAPEUTIC RANGE,PTTT: NORMAL SECS (ref 58–77)
TOTAL RESP. RATE, ITRR: 12
TSH SERPL DL<=0.05 MIU/L-ACNC: 1.28 UIU/ML (ref 0.36–3.74)
UA: UC IF INDICATED,UAUC: NORMAL
UROBILINOGEN UR QL STRIP.AUTO: 0.2 EU/DL (ref 0.2–1)
VENTRICULAR RATE, ECG03: 60 BPM
WBC # BLD AUTO: 4.4 K/UL (ref 4.1–11.1)
WBC URNS QL MICRO: NORMAL /HPF (ref 0–4)

## 2019-12-17 PROCEDURE — 85025 COMPLETE CBC W/AUTO DIFF WBC: CPT

## 2019-12-17 PROCEDURE — 81001 URINALYSIS AUTO W/SCOPE: CPT

## 2019-12-17 PROCEDURE — 86900 BLOOD TYPING SEROLOGIC ABO: CPT

## 2019-12-17 PROCEDURE — 36600 WITHDRAWAL OF ARTERIAL BLOOD: CPT

## 2019-12-17 PROCEDURE — 82803 BLOOD GASES ANY COMBINATION: CPT

## 2019-12-17 PROCEDURE — 36415 COLL VENOUS BLD VENIPUNCTURE: CPT

## 2019-12-17 PROCEDURE — 94010 BREATHING CAPACITY TEST: CPT

## 2019-12-17 PROCEDURE — 86923 COMPATIBILITY TEST ELECTRIC: CPT

## 2019-12-17 PROCEDURE — 85610 PROTHROMBIN TIME: CPT

## 2019-12-17 PROCEDURE — 93005 ELECTROCARDIOGRAM TRACING: CPT

## 2019-12-17 PROCEDURE — 83036 HEMOGLOBIN GLYCOSYLATED A1C: CPT

## 2019-12-17 PROCEDURE — 80053 COMPREHEN METABOLIC PANEL: CPT

## 2019-12-17 PROCEDURE — 84443 ASSAY THYROID STIM HORMONE: CPT

## 2019-12-17 PROCEDURE — 85730 THROMBOPLASTIN TIME PARTIAL: CPT

## 2019-12-17 PROCEDURE — 83880 ASSAY OF NATRIURETIC PEPTIDE: CPT

## 2019-12-17 PROCEDURE — 83735 ASSAY OF MAGNESIUM: CPT

## 2019-12-17 PROCEDURE — 71046 X-RAY EXAM CHEST 2 VIEWS: CPT

## 2019-12-17 NOTE — H&P
CSS   History and Physical    Subjective:      ## sections of this note obtained from prior office note. Bert Delgado is a 80 y.o. male  with PMHx of AS, CAD s/p LAD athrectomy & EFFIE (12/4/2019), GERD, Essential tremor, & OAB that is referred to the [address] [place] [address] by Dr. Dominique Hernández for interventional evaluation of AS.     Mr. Edi Lu reports a long standing cardiac murmur that has been followed serially by his cardiologist and now reports a decline in his activity tolerance and progressive THEODORE. He used to walk 3-4 miles/day and now walking 1-2 miles takes him longer than twice the distance used to. He reports SOB/THEODORE that limits his walks and causes him to stop/rest frequently. He denies any CP, chest tightness, palpitaitons, lightheadedness, syncope, falls, CHF admissions, LE edema, orthopnea or PND.     He reports a normal appetite and routine BMs w/o any blood/blackness/tarriness. His only recent medication changes have been the addition of clopdiogrel after his EFFIE in early December and his mysoline in November with improvement in tremors.       He denies any previous chest surgery/XRT/trauma. He is accompanied by his wife today but is independent in every way and does not use any DME.     NYHA Classification: II              Class II (Mild): Slight limitation of physical activity. Comfortable at rest, but ordinary physical activity results in fatigue, palpitation, or dyspnea. Angina Classification: 0              Class 0: No symptoms         Cardiac Testing    EKG 12/5/2019: SR 72 bpm. No blocks/axis deviations.  msec. QRSD 110 msec     TTE 10/23/2019:  Interpretation Summary   · Left Ventricle: Normal cavity size and systolic function (ejection fraction normal). Mildly increased wall thickness. Estimated left ventricular ejection fraction is 56 - 60%. No regional wall motion abnormality noted.  Mild (grade 1) left ventricular diastolic dysfunction. · Mitral Valve: Mitral annular calcification. Mild mitral valve regurgitation is present. · Left Atrium: Dilated left atrium. · Tricuspid Valve: Mild tricuspid valve regurgitation is present. · Aortic Valve: Severe aortic valve sclerosis with reduced excursion. Aortic valve leaflet calcification present with reduced excursion. Severe aortic valve stenosis is present. Mild to moderate aortic valve regurgitation is present. · Aortic Valve Systolic Mean Gradient 56 mm Hg; AV peak gradient ( pedhoff) -74 mm Hg   Echo Findings   Left Ventricle Normal cavity size and systolic function (ejection fraction normal). Mildly increased wall thickness. The estimated ejection fraction is 56 - 60%. No regional wall motion abnormality noted. There is mild (grade 1) left ventricular diastolic dysfunction. Left Atrium Dilated left atrium. No atrial septal defect present. Right Ventricle Normal cavity size, wall thickness and global systolic function. Right Atrium Normal cavity size. Aortic Valve Severe aortic valve sclerosis with reduced excursion. There is leaflet calcification with reduced excursion. There is severe aortic stenosis. Mild to moderate aortic valve regurgitation. Mitral Valve No stenosis. Leaflet calcification. Mitral annular calcification. Mild regurgitation. Tricuspid Valve Normal valve structure and no stenosis. Mild tricuspid valve regurgitation. Pulmonary arterial systolic pressure is 43.4 mmHg. Pulmonic Valve Pulmonic valve not well visualized, but normal doppler findings. No stenosis. Aorta Normal aortic root. IVC/Hepatic Veins Normal structure.    Pericardium No evidence of pericardial effusion.      Aorta Measurements       Aorta   Measurement Value (Range)   Ao Root D 3.39 cm              Aortic Valve Measurements       Stenosis   Aortic Valve Systolic Peak Velocity 439.70 cm/s      AoV PG 74.5 mmHg      Aortic Valve Systolic Mean Gradient 22.4 mmHg      AoV .39 cm Aortic Valve Area by Continuity of VTI 0.5 cm2      Aortic Valve Area by Continuity of Peak Velocity 0.5 cm2           Regurgitation/PISA   Aortic Regurgitant Pressure Half-time 781.1 cm      AR Max Franco 457.77 cm/s           LVOT   LVOT Peak Velocity 69.45 cm/s      LVOT Peak Gradient 1.9 mmHg      LVOT VTI 17.97 cm      LVOT d 2.07 cm              Mitral Valve Measurements       Regurgitation   Mitral Regurgitant Peak Velocity 525.71 cm/s      MR Peak Gradient 110.5 mmHg                  Tricuspid Valve Measurements       Stenosis   Est. RA Pressure 3 mmHg      PASP 37.8 mmHg      RVSP 37.8 mmHg           Regurgitation   Triscuspid Valve Regurgitation Peak Gradient 34.8 mmHg      TR Max Velocity 295.17 cm/s                Pulmonary Measurements       Stenosis/Regurgitation   PV peak gradient 2 mmHg      Pulmonic Valve Max Velocity 70.39 cm/s                Diastolic Filling/Shunts       Diastolic Filling   Mitral Valve E Wave Deceleration Time 462.1 ms      MV E Franco 46.24 cm/s      MV A Franco 70.58 cm/s      MV E/A 0.7            Shunt   LVOT SV 60.4 ml               Cardiac catheterization 11/6/2019: L Main: Large; ML   LAD: Prox - large; Mid/Distal Med; Ostial 30%; Mid - Ca+++; 80% ( haziness prob sec to Ca+); Another mid lesion - 70% ; Small to med D1- Prox 30%; Small  D2; Large tortuous septal  LCflex: Med to large; MLI; OM1 - Med to large; Prox 50%  RCA: Med to large; Prox/Mid - MLI; Distal at bifurcation 60-70%- small PDA/PLB  LVEDP: valve not crossed  LVEF: not assessed  No significant gradient across aortic valve. RHC findings:  RAPm= 4      mmHg  RVSP= 19    mmHg  PAPm= 14       mmHg  PCWPm=7    mmHg  CO=   6.02       l/min  CI=3.08     Cardiac Cath 12/4/2019:Diagnostic   Dominance: Right   Left Anterior Descending   Mid LAD lesion 80% stenosed. . The plaque feature of this lesion is calcified. Intervention   Mid LAD lesion   Atherectomy   Rotational atherectomy was performed Pass #: 4.  Adolphus size was 1.75 mm. 165 RPM FOR 35  RPM FOR 24  RPM FOR 33  RPM FOR 27 SEC   Supplies used: ROTABLATOR SYS ROTLNK + 1.75MM --    Stent   Stent was successfully placed. Post-Intervention Lesion Assessment   The pre-interventional distal flow is decreased (JENNIFER 2). Post-intervention JENNIFER flow is 3. There were no complications. There is a 0% residual stenosis post intervention.      Carotid Dopplers 11/5/2019:   1)  1-49% stenosis of the right internal carotid artery (low end of range). 2)  1-49% stenosis of the left internal carotid artery (low end of range). 3)  Vertebral arteries are patent with antegrade flow.       Gated C/A/P CTA 11/12/2019: FINDINGS: The thoracic aorta is normal in caliber and measures 32 x 31 mm at the level of the main pulmonary artery. Descending thoracic aorta measures 26 x 27 mm at the level of the main pulmonary artery. Aorta measures 21 x 29 mm at the level of the diaphragmatic hiatus. Distal abdominal aorta measures 6 16 x 19 mm in size. Right internal iliac artery 11 x 9 mm in size. There is no abdominal aortic aneurysm. There is mild atherosclerotic plaque along the anterior margin of the abdominal aorta. Celiac origin is without evidence of hemodynamically significant stenosis. SMA is patent as well. NIRMAL origin is patent. Mild atherosclerotic change in the renal arteries which are single bilaterally. No hemodynamically significant stenosis. Mild tortuosity of the common iliac artery on the left. No common iliac artery aneurysm. Femoral arteries measure 11 x 11 mm at the level of the femoral heads. Aortic annulus area 4.4 cm. Average diameter 25 mm. Common origin of the innominate and left common carotid artery. The left vertebral artery arises directly from the aortic arch. The right vertebral artery is dominant. Right thyroid hypodensity does not warrant further imaging follow-up. Scattered chronic interstitial and parenchymal changes in the pulmonary parenchyma. Minimal atelectatic change. Cardiomegaly. Coronary artery disease. Enlarged pulmonary arteries consistent with pulmonary arterial hypertension. There is no axillary hilar or mediastinal adenopathy. There is no pleural effusion. LIVER: Hepatic cysts. There is no intrahepatic duct dilatation. The CBD is not dilated. There is no hepatic parenchymal mass. Hepatic enhancement pattern is within normal limits. Portal vein is patent. GALLBLADDER:  No dilatation or wall thickening. SPLEEN/PANCREAS: No mass. Gerlene Rode is no pancreatic duct dilatation. There is no evidence of splenomegaly. ADRENALS/KIDNEYS: Minimal renal cortical thinning on the right and on the left. There is no hydronephrosis. There is no renal or ureteral calculus. There is no renal mass. There is no perinephric mass. COLON AND SMALL BOWEL: Fecal stasis There is no free intraperitoneal air. There is no evidence of incarceration or obstruction. No mesenteric adenopathy. PERITONEUM: Unremarkable. APPENDIX: Unremarkable. BLADDER/REPRODUCTIVE ORGANS: No mass or calculus. OSSEOUS STRUCTURES: Multilevel degenerative change in the lumbar spine. RETROPERITONEUM: Unremarkable. The abdominal aorta is normal in caliber. No aneurysm. No retroperitoneal adenopathy. The images will be sent to [facility] [employer] for additional post processing and measurements of the aortic valve annulus and coronary sinuses. IMPRESSION:  Preoperative examination for aortic valve repair.     Past Medical History:   Diagnosis Date    GERD (gastroesophageal reflux disease)     Hyperactivity of bladder     Hypercholesterolemia     Macular degeneration disease     Skin cancer (melanoma) (HCC)     Tremor      Past Surgical History:   Procedure Laterality Date    HX HERNIA REPAIR      HX MOHS PROCEDURES        Social History     Tobacco Use    Smoking status: Never Smoker    Smokeless tobacco: Never Used   Substance Use Topics    Alcohol use: Yes     Comment: beer or glass of wine nightly Family History   Problem Relation Age of Onset    Cancer Father     Lung Cancer Father         smoker    Heart Disease Brother     Diabetes Brother     Diabetes Maternal Grandfather     Prostate Cancer Brother      Prior to Admission medications    Medication Sig Start Date End Date Taking? Authorizing Provider   magnesium oxide (MAG-OX) 400 mg tablet Take 400 mg by mouth daily. Provider, Historical   aspirin delayed-release 81 mg tablet Take 1 Tab by mouth daily for 360 days. 12/5/19 11/29/20  Candido Solis., NP   clopidogrel (PLAVIX) 75 mg tab Take 1 Tab by mouth daily for 360 days. 12/6/19 11/30/20  Candido Solis, NP   atorvastatin (LIPITOR) 20 mg tablet Take 2 Tabs by mouth daily for 360 days. 12/5/19 11/29/20  Candido Solis, NP   famotidine (PEPCID) 20 mg tablet Take 1 Tab by mouth nightly. 12/3/19   Misti Guzman MD   primidone (MYSOLINE) 50 mg tablet Take 3 Tabs by mouth nightly for 90 days. 11/20/19 2/18/20  Misti Guzman MD   DETROL LA 4 mg ER capsule Take 1 Cap by mouth daily. 11/1/19   Misti Guzman MD   NIASPAN 500 mg tablet daily. 9/25/19   Provider, Historical   omeprazole (PRILOSEC) 20 mg capsule  9/7/19   Provider, Historical   Vit C-Vit E-Copper-ZnOx-Lutein (PRESERVISION LUTEIN) 226 mg-200 unit -5 mg-0.8 mg cap Take  by mouth. Provider, Historical   magnesium 250 mg tab Take  by mouth. Provider, Historical   COQ10, UBIQUINOL, PO Take  by mouth. Provider, Historical   cholecalciferol (VITAMIN D3) 1,000 unit cap Take  by mouth daily. Provider, Historical   cyanocobalamin 1,000 mcg tablet Take 1,000 mcg by mouth daily. Provider, Historical   folic acid (FOLVITE) 1 mg tablet Take  by mouth daily. Provider, Historical       Allergies   Allergen Reactions    Penicillins Hives       Review of Systems:  Pertinent positives in HPI   Consititutional: Denies fever or chills. Eyes:  Denies use of glasses or vision problems(cataracts).   ENT:  Denies hearing or swallowing difficulty. CV: Denies CP, claudication, HTN. Resp: Denies dyspnea, productive cough. : Denies dialysis or kidney problems. GI: Denies ulcers, esophageal strictures, liver problems. M/S: Denies joint or bone problems, or implanted artificial hardware. Skin: Denies varicose veins, edema. Neuro: Denies strokes, or TIAs. Psych: Denies anxiety or depression. Endocrine: Denies thyroid problems or diabetes. Heme/Lymphatic: Denies easy bruising or lymphedema. Objective:     VS: HR:  67     RESP:  16    BP: 96/70      02 SAT: 98     TEMP:  97.7 deg F oral     Physical Exam:    General appearance: alert, cooperative, no distress  Head: normocephalic, without obvious abnormality; atraumatic  Eyes: conjunctivae/corneas clear; EOM's intact. Nose: nares normal; no drainage. Neck: no carotid bruit and no JVD  Lungs: clear to auscultation bilaterally  Heart: regular rate and rhythm; ++ Grade 4/6 systolic murmur  Abdomen: soft, non-tender; bowel sounds normal  Extremities: moves all extremities; no weakness. Skin: Skin color normal; No varicose veins or edema. Neurologic: Grossly normal      Labs:   Recent Labs     12/17/19  1015   WBC 4.4   HGB 14.1   HCT 43.8   *      K 3.8   BUN 24*   CREA 0.83   GLU 96       Diagnostics:   PA and lateral:   CXR Results  (Last 48 hours)               12/17/19 1110  XR CHEST PA LAT Final result    Impression:  IMPRESSION:       No acute process. Stable diffuse interstitial lung markings. Stable right   hemidiaphragm elevation. Narrative:  EXAM:  XR CHEST PA LAT       INDICATION: Preoperative evaluation. Hyperlipidemia. Melanoma. COMPARISON: CT 11/12/2019       TECHNIQUE: PA and lateral chest views       FINDINGS: The cardiomediastinal contours are stable. The pulmonary vasculature   is within normal limits. There are stable diffuse interstitial lung markings without focal consolidation,   pleural effusion, or pneumothorax.  There is stable right hemidiaphragm   elevation. The bones and upper abdomen are stable. PFTS-FEV1:  1.86--85% predicted     EKG:   SR w/ PACs    Clinic Evaluation:   KCCQ-12: scanned into EMR     5 meter gait: 12.51 seconds     Frality Survey:  Garrido Index ADL - 6/6  scanned into EMR      STS 2.9 Risk Score / Predicted 30 day mortality: - calculations scanned into EMR  [facility] Adult Cardiac Surgery Database Version   2.9 RISK SCORES Procedure: Isolated AVR CALCULATE   Risk of Mortality:  1.592%    Renal Failure:  1.391%    Permanent Stroke:  1.464%    Prolonged Ventilation:  4.280%    DSW Infection:  0.103%    Reoperation:  3.804%    Morbidity or Mortality:  8.498%    Short Length of Stay:  38.126%    Long Length of Stay:  4.463%        Assessment:     Active Problems: Aortic valve stenosis (11/5/2019)        Plan:   The risk and benefit of surgery were reviewed with patient and family and all questions answered and the patient wishes to proceed. Risk include infection, bleeding, stroke, heart attack, irregular heart rhythm, kidney failure and death. The patient was given instructions. The patient was instructed to stop no medications. Surgery is scheduled for 12/20/19    Treatment Plan:    1. AS - severe and symptomatic AS. At least an intermediate risk surgical risk d/t frailty. Risks/benefits of TAVR reviewed w/ pt and wife and they wish to proceed. R TF 26 S3 with MAC  2. CAD s/p LAD atheretcomy & EFFIE (12/4/2019) - ASA/Clopidgrel/BB/statin  3. GERD - pepcid   4. Essential tremor - primidone with improvement in tremors per PCP  5. OAB - Detrol LA per urology  6. Elevated R hemidiaphragm:  Noted on cxr.       Signed By: Geneva Goel NP     December 17, 2019

## 2019-12-17 NOTE — PROGRESS NOTES
12/17/19 1010 12/17/19 1011 12/17/19 1012 RT Walking Oximetry Stage Resting (Room Air) During Walk [employer]) During Walk (on O2) SpO2 93 % (!) 81 % 91 % HR 84 bpm 92 bpm 102 bpm  
Rate of Dyspnea 0 1 0  
O2 Flow Rate (L/min)  --   --  3 l/min FIO2 (%)  --   --  36 % Distance Walked in Feet (ft)  --  50 ft  --   
Comments  --   --   --   
 
 12/17/19 1014 12/17/19 1016 RT Walking Oximetry Stage During Walk (on O2) After Walk SpO2 93 % 93 % HR  --  100 bpm  
Rate of Dyspnea  --   --   
O2 Flow Rate (L/min)  --   --   
FIO2 (%)  --   --   
Distance Walked in Feet (ft)  --  515 ft  
Comments  --   
(pt requires oxygen during walk. )

## 2019-12-17 NOTE — CONSULTS
Anesthesiology Consult   Requested by the surgeon to evaluate for   1. General anesthesia with intubation  2. SHARAN   3. Invasive monitoring and catheters  for proposed procedure. Subjective:      Patient:  Medhat Loredo     Procedure: Procedure(s):  TRANSCATHETER AORTIC VALVE REPLACEMENT RIGHT TRANSFEMORAL 26 S3 PRE-BAV    Allergies   Allergen Reactions    Penicillins Hives       No current facility-administered medications for this encounter. Current Outpatient Medications   Medication Sig    magnesium oxide (MAG-OX) 400 mg tablet Take 400 mg by mouth daily.  aspirin delayed-release 81 mg tablet Take 1 Tab by mouth daily for 360 days.  clopidogrel (PLAVIX) 75 mg tab Take 1 Tab by mouth daily for 360 days.  atorvastatin (LIPITOR) 20 mg tablet Take 2 Tabs by mouth daily for 360 days.  famotidine (PEPCID) 20 mg tablet Take 1 Tab by mouth nightly.  primidone (MYSOLINE) 50 mg tablet Take 3 Tabs by mouth nightly for 90 days.  DETROL LA 4 mg ER capsule Take 1 Cap by mouth daily.  NIASPAN 500 mg tablet daily.  omeprazole (PRILOSEC) 20 mg capsule     Vit C-Vit E-Copper-ZnOx-Lutein (PRESERVISION LUTEIN) 226 mg-200 unit -5 mg-0.8 mg cap Take  by mouth.  magnesium 250 mg tab Take  by mouth.  COQ10, UBIQUINOL, PO Take  by mouth.  cholecalciferol (VITAMIN D3) 1,000 unit cap Take  by mouth daily.  cyanocobalamin 1,000 mcg tablet Take 1,000 mcg by mouth daily.  folic acid (FOLVITE) 1 mg tablet Take  by mouth daily.        Past Medical History:   Diagnosis Date    GERD (gastroesophageal reflux disease)     Hyperactivity of bladder     Hypercholesterolemia     Macular degeneration disease     Skin cancer (melanoma) (HCC)     Tremor        Past Surgical History:   Procedure Laterality Date    HX HERNIA REPAIR      HX MOHS PROCEDURES         Social History     Tobacco Use    Smoking status: Never Smoker    Smokeless tobacco: Never Used   Substance Use Topics    Alcohol use: Yes     Comment: beer or glass of wine nightly          Anesthetic Problems: none   Denies a history of dysphagia, gastric ulcer, diverticulum, esophageal stricture, stomach surgeries or radiation in the past.    Objective:     Physical Exam:    Temp (24hrs), Av.1 °C (96.9 °F), Min:36.1 °C (96.9 °F), Max:36.1 °C (96.9 °F)      General appearance: alert, cooperative, no distress, appears stated age   Airway: Mallampati 3, caps and crowns, oral aperture diminished, neck with diminished range of motion. Neck: symmetrical, trachea midline, no JVD   Lungs: clear to auscultation bilaterally   Heart: regular rhythm and normal rate, systolic murmur  Neurologic: Grossly normal    Labs: No results found for this or any previous visit (from the past 24 hour(s)). Assessment:   No apparent contraindications for general anesthesia with intubation,SHARAN or catheters for proposed surgical procedure. Active Problems:    * No active hospital problems. *      ASA 4      Plan/Recommendations/Medical Decision Making:       Anesthetic Plan: MAC    Risks and benefits of the anesthetic plan including but not limited to intra and postoperative intubation, invasive monitoring, catheter placement, and SHARAN discussed and agreed upon by the patient. All questions answered. Definitive plan pending day of surgery.        Signed By: Linda Dubon DO  Date:           2019  Time:          9:58 AM

## 2019-12-19 ENCOUNTER — ANESTHESIA EVENT (OUTPATIENT)
Dept: CARDIOTHORACIC SURGERY | Age: 84
DRG: 267 | End: 2019-12-19
Payer: MEDICARE

## 2019-12-19 RX ORDER — SODIUM CHLORIDE 9 MG/ML
1.5-3 INJECTION, SOLUTION INTRAVENOUS
Status: DISCONTINUED | OUTPATIENT
Start: 2019-12-20 | End: 2019-12-20

## 2019-12-19 RX ORDER — PHENYLEPHRINE 10 MG/250 ML(40 MCG/ML)IN 0.9 % SOD.CHLORIDE INTRAVENOUS
10-100
Status: DISCONTINUED | OUTPATIENT
Start: 2019-12-20 | End: 2019-12-20 | Stop reason: HOSPADM

## 2019-12-20 ENCOUNTER — APPOINTMENT (OUTPATIENT)
Dept: NON INVASIVE DIAGNOSTICS | Age: 84
DRG: 267 | End: 2019-12-20
Attending: THORACIC SURGERY (CARDIOTHORACIC VASCULAR SURGERY)
Payer: MEDICARE

## 2019-12-20 ENCOUNTER — APPOINTMENT (OUTPATIENT)
Dept: GENERAL RADIOLOGY | Age: 84
DRG: 267 | End: 2019-12-20
Attending: THORACIC SURGERY (CARDIOTHORACIC VASCULAR SURGERY)
Payer: MEDICARE

## 2019-12-20 ENCOUNTER — APPOINTMENT (OUTPATIENT)
Dept: GENERAL RADIOLOGY | Age: 84
DRG: 267 | End: 2019-12-20
Attending: NURSE PRACTITIONER
Payer: MEDICARE

## 2019-12-20 ENCOUNTER — ANESTHESIA (OUTPATIENT)
Dept: CARDIOTHORACIC SURGERY | Age: 84
DRG: 267 | End: 2019-12-20
Payer: MEDICARE

## 2019-12-20 ENCOUNTER — HOSPITAL ENCOUNTER (INPATIENT)
Age: 84
LOS: 1 days | Discharge: HOME | DRG: 267 | End: 2019-12-21
Attending: THORACIC SURGERY (CARDIOTHORACIC VASCULAR SURGERY) | Admitting: THORACIC SURGERY (CARDIOTHORACIC VASCULAR SURGERY)
Payer: MEDICARE

## 2019-12-20 DIAGNOSIS — Z95.2 S/P TAVR (TRANSCATHETER AORTIC VALVE REPLACEMENT): Primary | ICD-10-CM

## 2019-12-20 PROBLEM — I35.0 AS (AORTIC STENOSIS): Status: ACTIVE | Noted: 2019-12-20

## 2019-12-20 LAB
ALBUMIN SERPL-MCNC: 3.2 G/DL (ref 3.5–5)
ALBUMIN/GLOB SERPL: 1 {RATIO} (ref 1.1–2.2)
ALP SERPL-CCNC: 75 U/L (ref 45–117)
ALT SERPL-CCNC: 32 U/L (ref 12–78)
ANION GAP SERPL CALC-SCNC: 4 MMOL/L (ref 5–15)
APTT PPP: 30.7 SEC (ref 22.1–32)
AST SERPL-CCNC: 30 U/L (ref 15–37)
ATRIAL RATE: 72 BPM
BASOPHILS # BLD: 0 K/UL (ref 0–0.1)
BASOPHILS NFR BLD: 0 % (ref 0–1)
BILIRUB SERPL-MCNC: 0.3 MG/DL (ref 0.2–1)
BUN SERPL-MCNC: 14 MG/DL (ref 6–20)
BUN/CREAT SERPL: 23 (ref 12–20)
CALCIUM SERPL-MCNC: 8.3 MG/DL (ref 8.5–10.1)
CALCULATED P AXIS, ECG09: 27 DEGREES
CALCULATED R AXIS, ECG10: -13 DEGREES
CALCULATED T AXIS, ECG11: 145 DEGREES
CHLORIDE SERPL-SCNC: 109 MMOL/L (ref 97–108)
CO2 SERPL-SCNC: 26 MMOL/L (ref 21–32)
CREAT SERPL-MCNC: 0.61 MG/DL (ref 0.7–1.3)
DIAGNOSIS, 93000: NORMAL
DIFFERENTIAL METHOD BLD: ABNORMAL
EOSINOPHIL # BLD: 0 K/UL (ref 0–0.4)
EOSINOPHIL NFR BLD: 0 % (ref 0–7)
ERYTHROCYTE [DISTWIDTH] IN BLOOD BY AUTOMATED COUNT: 12.8 % (ref 11.5–14.5)
GLOBULIN SER CALC-MCNC: 3.1 G/DL (ref 2–4)
GLUCOSE SERPL-MCNC: 108 MG/DL (ref 65–100)
HCT VFR BLD AUTO: 40.3 % (ref 36.6–50.3)
HGB BLD-MCNC: 13 G/DL (ref 12.1–17)
IMM GRANULOCYTES # BLD AUTO: 0 K/UL
IMM GRANULOCYTES NFR BLD AUTO: 0 %
INR PPP: 1.2 (ref 0.9–1.1)
LYMPHOCYTES # BLD: 0.6 K/UL (ref 0.8–3.5)
LYMPHOCYTES NFR BLD: 8 % (ref 12–49)
MAGNESIUM SERPL-MCNC: 1.7 MG/DL (ref 1.6–2.4)
MCH RBC QN AUTO: 29.9 PG (ref 26–34)
MCHC RBC AUTO-ENTMCNC: 32.3 G/DL (ref 30–36.5)
MCV RBC AUTO: 92.6 FL (ref 80–99)
MONOCYTES # BLD: 0.3 K/UL (ref 0–1)
MONOCYTES NFR BLD: 5 % (ref 5–13)
NEUTS BAND NFR BLD MANUAL: 3 % (ref 0–6)
NEUTS SEG # BLD: 6 K/UL (ref 1.8–8)
NEUTS SEG NFR BLD: 84 % (ref 32–75)
NRBC # BLD: 0 K/UL (ref 0–0.01)
NRBC BLD-RTO: 0 PER 100 WBC
P-R INTERVAL, ECG05: 172 MS
PLATELET # BLD AUTO: 124 K/UL (ref 150–400)
PMV BLD AUTO: 10.7 FL (ref 8.9–12.9)
POTASSIUM SERPL-SCNC: 4 MMOL/L (ref 3.5–5.1)
PROT SERPL-MCNC: 6.3 G/DL (ref 6.4–8.2)
PROTHROMBIN TIME: 12.2 SEC (ref 9–11.1)
Q-T INTERVAL, ECG07: 444 MS
QRS DURATION, ECG06: 128 MS
QTC CALCULATION (BEZET), ECG08: 486 MS
RBC # BLD AUTO: 4.35 M/UL (ref 4.1–5.7)
RBC MORPH BLD: ABNORMAL
SODIUM SERPL-SCNC: 139 MMOL/L (ref 136–145)
THERAPEUTIC RANGE,PTTT: NORMAL SECS (ref 58–77)
VENTRICULAR RATE, ECG03: 72 BPM
WBC # BLD AUTO: 6.9 K/UL (ref 4.1–11.1)

## 2019-12-20 PROCEDURE — 77030005513 HC CATH URETH FOL11 MDII -B: Performed by: THORACIC SURGERY (CARDIOTHORACIC VASCULAR SURGERY)

## 2019-12-20 PROCEDURE — 76010000222 HC CV SURG 2 TO 2.5 HR INTENSV-TIER 1: Performed by: THORACIC SURGERY (CARDIOTHORACIC VASCULAR SURGERY)

## 2019-12-20 PROCEDURE — X2RF332 REPLACEMENT OF AORTIC VALVE USING ZOOPLASTIC TISSUE, RAPID DEPLOYMENT TECHNIQUE, PERCUTANEOUS APPROACH, NEW TECHNOLOGY GROUP 2: ICD-10-PCS | Performed by: INTERNAL MEDICINE

## 2019-12-20 PROCEDURE — C1769 GUIDE WIRE: HCPCS | Performed by: THORACIC SURGERY (CARDIOTHORACIC VASCULAR SURGERY)

## 2019-12-20 PROCEDURE — C1760 CLOSURE DEV, VASC: HCPCS | Performed by: THORACIC SURGERY (CARDIOTHORACIC VASCULAR SURGERY)

## 2019-12-20 PROCEDURE — 74011250636 HC RX REV CODE- 250/636: Performed by: NURSE PRACTITIONER

## 2019-12-20 PROCEDURE — C1894 INTRO/SHEATH, NON-LASER: HCPCS | Performed by: THORACIC SURGERY (CARDIOTHORACIC VASCULAR SURGERY)

## 2019-12-20 PROCEDURE — 74011250636 HC RX REV CODE- 250/636: Performed by: THORACIC SURGERY (CARDIOTHORACIC VASCULAR SURGERY)

## 2019-12-20 PROCEDURE — 71045 X-RAY EXAM CHEST 1 VIEW: CPT

## 2019-12-20 PROCEDURE — 65610000003 HC RM ICU SURGICAL

## 2019-12-20 PROCEDURE — 83735 ASSAY OF MAGNESIUM: CPT

## 2019-12-20 PROCEDURE — B41D1ZZ FLUOROSCOPY OF AORTA AND BILATERAL LOWER EXTREMITY ARTERIES USING LOW OSMOLAR CONTRAST: ICD-10-PCS | Performed by: INTERNAL MEDICINE

## 2019-12-20 PROCEDURE — 76060000035 HC ANESTHESIA 2 TO 2.5 HR: Performed by: THORACIC SURGERY (CARDIOTHORACIC VASCULAR SURGERY)

## 2019-12-20 PROCEDURE — 36415 COLL VENOUS BLD VENIPUNCTURE: CPT

## 2019-12-20 PROCEDURE — 80053 COMPREHEN METABOLIC PANEL: CPT

## 2019-12-20 PROCEDURE — 5A1223Z PERFORMANCE OF CARDIAC PACING, CONTINUOUS: ICD-10-PCS | Performed by: INTERNAL MEDICINE

## 2019-12-20 PROCEDURE — 77030037468 HC VLV AORT EDWRD -L: Performed by: THORACIC SURGERY (CARDIOTHORACIC VASCULAR SURGERY)

## 2019-12-20 PROCEDURE — 8E0WXBF COMPUTER ASSISTED PROCEDURE OF TRUNK REGION, WITH FLUOROSCOPY: ICD-10-PCS | Performed by: INTERNAL MEDICINE

## 2019-12-20 PROCEDURE — 74011000250 HC RX REV CODE- 250: Performed by: ANESTHESIOLOGY

## 2019-12-20 PROCEDURE — 77030004532 HC CATH ANGI DX IMP BSC -A: Performed by: THORACIC SURGERY (CARDIOTHORACIC VASCULAR SURGERY)

## 2019-12-20 PROCEDURE — 93005 ELECTROCARDIOGRAM TRACING: CPT

## 2019-12-20 PROCEDURE — 77030005402 HC CATH RAD ART LN KT TELE -B

## 2019-12-20 PROCEDURE — 74011000250 HC RX REV CODE- 250: Performed by: NURSE ANESTHETIST, CERTIFIED REGISTERED

## 2019-12-20 PROCEDURE — 74011250636 HC RX REV CODE- 250/636: Performed by: NURSE ANESTHETIST, CERTIFIED REGISTERED

## 2019-12-20 PROCEDURE — 74011000250 HC RX REV CODE- 250: Performed by: NURSE PRACTITIONER

## 2019-12-20 PROCEDURE — 77030019702 HC WRP THER MENM -C: Performed by: THORACIC SURGERY (CARDIOTHORACIC VASCULAR SURGERY)

## 2019-12-20 PROCEDURE — 77030011640 HC PAD GRND REM COVD -A: Performed by: THORACIC SURGERY (CARDIOTHORACIC VASCULAR SURGERY)

## 2019-12-20 PROCEDURE — 77030018729 HC ELECTRD DEFIB PAD CARD -B

## 2019-12-20 PROCEDURE — 93308 TTE F-UP OR LMTD: CPT

## 2019-12-20 PROCEDURE — 85730 THROMBOPLASTIN TIME PARTIAL: CPT

## 2019-12-20 PROCEDURE — 85610 PROTHROMBIN TIME: CPT

## 2019-12-20 PROCEDURE — 77030041244 HC CBL PACE EXT TEMP REMG -B: Performed by: THORACIC SURGERY (CARDIOTHORACIC VASCULAR SURGERY)

## 2019-12-20 PROCEDURE — B2161ZZ FLUOROSCOPY OF RIGHT AND LEFT HEART USING LOW OSMOLAR CONTRAST: ICD-10-PCS | Performed by: INTERNAL MEDICINE

## 2019-12-20 PROCEDURE — 4A023N7 MEASUREMENT OF CARDIAC SAMPLING AND PRESSURE, LEFT HEART, PERCUTANEOUS APPROACH: ICD-10-PCS | Performed by: INTERNAL MEDICINE

## 2019-12-20 PROCEDURE — 85025 COMPLETE CBC W/AUTO DIFF WBC: CPT

## 2019-12-20 PROCEDURE — C1892 INTRO/SHEATH,FIXED,PEEL-AWAY: HCPCS | Performed by: THORACIC SURGERY (CARDIOTHORACIC VASCULAR SURGERY)

## 2019-12-20 DEVICE — VALVE AORTIC SAPEIN 3 26MM -- COMMANDER SYS 9600TFX: Type: IMPLANTABLE DEVICE | Site: AORTIC VALVE | Status: FUNCTIONAL

## 2019-12-20 RX ORDER — FENTANYL CITRATE 50 UG/ML
25 INJECTION, SOLUTION INTRAMUSCULAR; INTRAVENOUS
Status: CANCELLED | OUTPATIENT
Start: 2019-12-20

## 2019-12-20 RX ORDER — ACETAMINOPHEN 325 MG/1
650 TABLET ORAL ONCE
Status: DISCONTINUED | OUTPATIENT
Start: 2019-12-20 | End: 2019-12-20 | Stop reason: HOSPADM

## 2019-12-20 RX ORDER — FACIAL-BODY WIPES
10 EACH TOPICAL DAILY PRN
Status: DISCONTINUED | OUTPATIENT
Start: 2019-12-20 | End: 2019-12-21 | Stop reason: HOSPADM

## 2019-12-20 RX ORDER — POTASSIUM CHLORIDE 29.8 MG/ML
20 INJECTION INTRAVENOUS
Status: ACTIVE | OUTPATIENT
Start: 2019-12-20 | End: 2019-12-21

## 2019-12-20 RX ORDER — MIDAZOLAM HYDROCHLORIDE 1 MG/ML
0.5 INJECTION, SOLUTION INTRAMUSCULAR; INTRAVENOUS
Status: CANCELLED | OUTPATIENT
Start: 2019-12-20

## 2019-12-20 RX ORDER — BACITRACIN 500 UNIT/G
1 PACKET (EA) TOPICAL AS NEEDED
Status: DISCONTINUED | OUTPATIENT
Start: 2019-12-20 | End: 2019-12-21 | Stop reason: HOSPADM

## 2019-12-20 RX ORDER — SODIUM CHLORIDE 0.9 % (FLUSH) 0.9 %
5-40 SYRINGE (ML) INJECTION EVERY 8 HOURS
Status: DISCONTINUED | OUTPATIENT
Start: 2019-12-20 | End: 2019-12-21 | Stop reason: HOSPADM

## 2019-12-20 RX ORDER — MIDAZOLAM HYDROCHLORIDE 1 MG/ML
1 INJECTION, SOLUTION INTRAMUSCULAR; INTRAVENOUS AS NEEDED
Status: DISCONTINUED | OUTPATIENT
Start: 2019-12-20 | End: 2019-12-20 | Stop reason: HOSPADM

## 2019-12-20 RX ORDER — MORPHINE SULFATE 2 MG/ML
2 INJECTION, SOLUTION INTRAMUSCULAR; INTRAVENOUS
Status: DISCONTINUED | OUTPATIENT
Start: 2019-12-20 | End: 2019-12-21 | Stop reason: HOSPADM

## 2019-12-20 RX ORDER — FENTANYL CITRATE 50 UG/ML
50 INJECTION, SOLUTION INTRAMUSCULAR; INTRAVENOUS AS NEEDED
Status: DISCONTINUED | OUTPATIENT
Start: 2019-12-20 | End: 2019-12-20 | Stop reason: HOSPADM

## 2019-12-20 RX ORDER — SODIUM CHLORIDE 450 MG/100ML
10 INJECTION, SOLUTION INTRAVENOUS CONTINUOUS
Status: DISCONTINUED | OUTPATIENT
Start: 2019-12-20 | End: 2019-12-21 | Stop reason: HOSPADM

## 2019-12-20 RX ORDER — KETAMINE HYDROCHLORIDE 10 MG/ML
INJECTION, SOLUTION INTRAMUSCULAR; INTRAVENOUS AS NEEDED
Status: DISCONTINUED | OUTPATIENT
Start: 2019-12-20 | End: 2019-12-20 | Stop reason: HOSPADM

## 2019-12-20 RX ORDER — NALOXONE HYDROCHLORIDE 0.4 MG/ML
0.4 INJECTION, SOLUTION INTRAMUSCULAR; INTRAVENOUS; SUBCUTANEOUS AS NEEDED
Status: DISCONTINUED | OUTPATIENT
Start: 2019-12-20 | End: 2019-12-21 | Stop reason: HOSPADM

## 2019-12-20 RX ORDER — ROPIVACAINE HYDROCHLORIDE 5 MG/ML
30 INJECTION, SOLUTION EPIDURAL; INFILTRATION; PERINEURAL AS NEEDED
Status: DISCONTINUED | OUTPATIENT
Start: 2019-12-20 | End: 2019-12-20 | Stop reason: HOSPADM

## 2019-12-20 RX ORDER — MORPHINE SULFATE 10 MG/ML
2 INJECTION, SOLUTION INTRAMUSCULAR; INTRAVENOUS
Status: CANCELLED | OUTPATIENT
Start: 2019-12-20

## 2019-12-20 RX ORDER — HEPARIN SODIUM 1000 [USP'U]/ML
INJECTION, SOLUTION INTRAVENOUS; SUBCUTANEOUS AS NEEDED
Status: DISCONTINUED | OUTPATIENT
Start: 2019-12-20 | End: 2019-12-20 | Stop reason: HOSPADM

## 2019-12-20 RX ORDER — SODIUM CHLORIDE, SODIUM LACTATE, POTASSIUM CHLORIDE, CALCIUM CHLORIDE 600; 310; 30; 20 MG/100ML; MG/100ML; MG/100ML; MG/100ML
100 INJECTION, SOLUTION INTRAVENOUS CONTINUOUS
Status: CANCELLED | OUTPATIENT
Start: 2019-12-20

## 2019-12-20 RX ORDER — ACETAMINOPHEN 325 MG/1
650 TABLET ORAL
Status: DISCONTINUED | OUTPATIENT
Start: 2019-12-20 | End: 2019-12-21 | Stop reason: HOSPADM

## 2019-12-20 RX ORDER — OXYCODONE AND ACETAMINOPHEN 5; 325 MG/1; MG/1
1-2 TABLET ORAL
Status: DISCONTINUED | OUTPATIENT
Start: 2019-12-20 | End: 2019-12-21 | Stop reason: HOSPADM

## 2019-12-20 RX ORDER — DIPHENHYDRAMINE HYDROCHLORIDE 50 MG/ML
12.5 INJECTION, SOLUTION INTRAMUSCULAR; INTRAVENOUS AS NEEDED
Status: CANCELLED | OUTPATIENT
Start: 2019-12-20 | End: 2019-12-20

## 2019-12-20 RX ORDER — AMOXICILLIN 250 MG
1 CAPSULE ORAL 2 TIMES DAILY
Status: DISCONTINUED | OUTPATIENT
Start: 2019-12-21 | End: 2019-12-21 | Stop reason: HOSPADM

## 2019-12-20 RX ORDER — PROPOFOL 10 MG/ML
INJECTION, EMULSION INTRAVENOUS
Status: DISCONTINUED | OUTPATIENT
Start: 2019-12-20 | End: 2019-12-20 | Stop reason: HOSPADM

## 2019-12-20 RX ORDER — ONDANSETRON 2 MG/ML
4 INJECTION INTRAMUSCULAR; INTRAVENOUS AS NEEDED
Status: CANCELLED | OUTPATIENT
Start: 2019-12-20

## 2019-12-20 RX ORDER — SODIUM CHLORIDE 0.9 % (FLUSH) 0.9 %
5-40 SYRINGE (ML) INJECTION AS NEEDED
Status: DISCONTINUED | OUTPATIENT
Start: 2019-12-20 | End: 2019-12-20 | Stop reason: HOSPADM

## 2019-12-20 RX ORDER — SODIUM CHLORIDE 0.9 % (FLUSH) 0.9 %
5-40 SYRINGE (ML) INJECTION EVERY 8 HOURS
Status: DISCONTINUED | OUTPATIENT
Start: 2019-12-20 | End: 2019-12-20 | Stop reason: HOSPADM

## 2019-12-20 RX ORDER — SODIUM CHLORIDE 0.9 % (FLUSH) 0.9 %
5-40 SYRINGE (ML) INJECTION AS NEEDED
Status: CANCELLED | OUTPATIENT
Start: 2019-12-20

## 2019-12-20 RX ORDER — SODIUM CHLORIDE 9 MG/ML
9 INJECTION, SOLUTION INTRAVENOUS CONTINUOUS
Status: DISCONTINUED | OUTPATIENT
Start: 2019-12-20 | End: 2019-12-21 | Stop reason: HOSPADM

## 2019-12-20 RX ORDER — DEXMEDETOMIDINE HYDROCHLORIDE 100 UG/ML
INJECTION, SOLUTION INTRAVENOUS AS NEEDED
Status: DISCONTINUED | OUTPATIENT
Start: 2019-12-20 | End: 2019-12-20 | Stop reason: HOSPADM

## 2019-12-20 RX ORDER — DIPHENHYDRAMINE HYDROCHLORIDE 50 MG/ML
25 INJECTION, SOLUTION INTRAMUSCULAR; INTRAVENOUS
Status: DISCONTINUED | OUTPATIENT
Start: 2019-12-20 | End: 2019-12-21 | Stop reason: HOSPADM

## 2019-12-20 RX ORDER — SODIUM CHLORIDE, SODIUM LACTATE, POTASSIUM CHLORIDE, CALCIUM CHLORIDE 600; 310; 30; 20 MG/100ML; MG/100ML; MG/100ML; MG/100ML
100 INJECTION, SOLUTION INTRAVENOUS CONTINUOUS
Status: DISCONTINUED | OUTPATIENT
Start: 2019-12-20 | End: 2019-12-20 | Stop reason: HOSPADM

## 2019-12-20 RX ORDER — LIDOCAINE HYDROCHLORIDE 10 MG/ML
0.1 INJECTION, SOLUTION EPIDURAL; INFILTRATION; INTRACAUDAL; PERINEURAL AS NEEDED
Status: DISCONTINUED | OUTPATIENT
Start: 2019-12-20 | End: 2019-12-20 | Stop reason: HOSPADM

## 2019-12-20 RX ORDER — FAMOTIDINE 20 MG/1
20 TABLET, FILM COATED ORAL EVERY 12 HOURS
Status: DISCONTINUED | OUTPATIENT
Start: 2019-12-21 | End: 2019-12-21 | Stop reason: HOSPADM

## 2019-12-20 RX ORDER — PROPOFOL 10 MG/ML
INJECTION, EMULSION INTRAVENOUS AS NEEDED
Status: DISCONTINUED | OUTPATIENT
Start: 2019-12-20 | End: 2019-12-20

## 2019-12-20 RX ORDER — SODIUM CHLORIDE 9 MG/ML
INJECTION, SOLUTION INTRAVENOUS
Status: DISCONTINUED | OUTPATIENT
Start: 2019-12-20 | End: 2019-12-20 | Stop reason: HOSPADM

## 2019-12-20 RX ORDER — LANOLIN ALCOHOL/MO/W.PET/CERES
3 CREAM (GRAM) TOPICAL
Status: DISCONTINUED | OUTPATIENT
Start: 2019-12-20 | End: 2019-12-21 | Stop reason: HOSPADM

## 2019-12-20 RX ORDER — ALBUTEROL SULFATE 0.83 MG/ML
2.5 SOLUTION RESPIRATORY (INHALATION)
Status: DISCONTINUED | OUTPATIENT
Start: 2019-12-20 | End: 2019-12-21 | Stop reason: HOSPADM

## 2019-12-20 RX ORDER — GUAIFENESIN 100 MG/5ML
81 LIQUID (ML) ORAL DAILY
Status: DISCONTINUED | OUTPATIENT
Start: 2019-12-21 | End: 2019-12-21 | Stop reason: HOSPADM

## 2019-12-20 RX ORDER — SODIUM CHLORIDE 0.9 % (FLUSH) 0.9 %
5-40 SYRINGE (ML) INJECTION EVERY 8 HOURS
Status: CANCELLED | OUTPATIENT
Start: 2019-12-20

## 2019-12-20 RX ORDER — CEFAZOLIN SODIUM/WATER 2 G/20 ML
2 SYRINGE (ML) INTRAVENOUS EVERY 8 HOURS
Status: COMPLETED | OUTPATIENT
Start: 2019-12-20 | End: 2019-12-21

## 2019-12-20 RX ORDER — HYDROMORPHONE HYDROCHLORIDE 1 MG/ML
0.5 INJECTION, SOLUTION INTRAMUSCULAR; INTRAVENOUS; SUBCUTANEOUS
Status: CANCELLED | OUTPATIENT
Start: 2019-12-20

## 2019-12-20 RX ORDER — LANOLIN ALCOHOL/MO/W.PET/CERES
400 CREAM (GRAM) TOPICAL 2 TIMES DAILY
Status: DISCONTINUED | OUTPATIENT
Start: 2019-12-21 | End: 2019-12-21 | Stop reason: HOSPADM

## 2019-12-20 RX ORDER — ONDANSETRON 2 MG/ML
4 INJECTION INTRAMUSCULAR; INTRAVENOUS
Status: DISCONTINUED | OUTPATIENT
Start: 2019-12-20 | End: 2019-12-21 | Stop reason: HOSPADM

## 2019-12-20 RX ORDER — DIPHENHYDRAMINE HCL 25 MG
25 CAPSULE ORAL
Status: DISCONTINUED | OUTPATIENT
Start: 2019-12-20 | End: 2019-12-21 | Stop reason: HOSPADM

## 2019-12-20 RX ORDER — TRAMADOL HYDROCHLORIDE 50 MG/1
50-100 TABLET ORAL
Status: DISCONTINUED | OUTPATIENT
Start: 2019-12-20 | End: 2019-12-21 | Stop reason: HOSPADM

## 2019-12-20 RX ORDER — SODIUM CHLORIDE 9 MG/ML
25 INJECTION, SOLUTION INTRAVENOUS CONTINUOUS
Status: DISCONTINUED | OUTPATIENT
Start: 2019-12-20 | End: 2019-12-20 | Stop reason: HOSPADM

## 2019-12-20 RX ORDER — DEXAMETHASONE SODIUM PHOSPHATE 4 MG/ML
INJECTION, SOLUTION INTRA-ARTICULAR; INTRALESIONAL; INTRAMUSCULAR; INTRAVENOUS; SOFT TISSUE AS NEEDED
Status: DISCONTINUED | OUTPATIENT
Start: 2019-12-20 | End: 2019-12-20 | Stop reason: HOSPADM

## 2019-12-20 RX ORDER — CEFAZOLIN SODIUM/WATER 2 G/20 ML
2 SYRINGE (ML) INTRAVENOUS ONCE
Status: COMPLETED | OUTPATIENT
Start: 2019-12-20 | End: 2019-12-20

## 2019-12-20 RX ORDER — PROPOFOL 10 MG/ML
INJECTION, EMULSION INTRAVENOUS AS NEEDED
Status: DISCONTINUED | OUTPATIENT
Start: 2019-12-20 | End: 2019-12-20 | Stop reason: HOSPADM

## 2019-12-20 RX ORDER — SODIUM CHLORIDE 0.9 % (FLUSH) 0.9 %
5-40 SYRINGE (ML) INJECTION AS NEEDED
Status: DISCONTINUED | OUTPATIENT
Start: 2019-12-20 | End: 2019-12-21 | Stop reason: HOSPADM

## 2019-12-20 RX ORDER — PROTAMINE SULFATE 10 MG/ML
INJECTION, SOLUTION INTRAVENOUS AS NEEDED
Status: DISCONTINUED | OUTPATIENT
Start: 2019-12-20 | End: 2019-12-20 | Stop reason: HOSPADM

## 2019-12-20 RX ADMIN — HEPARIN SODIUM 1000 UNITS: 1000 INJECTION, SOLUTION INTRAVENOUS; SUBCUTANEOUS at 09:25

## 2019-12-20 RX ADMIN — SODIUM CHLORIDE: 900 INJECTION, SOLUTION INTRAVENOUS at 08:55

## 2019-12-20 RX ADMIN — KETAMINE HYDROCHLORIDE 10 MG: 10 INJECTION, SOLUTION INTRAMUSCULAR; INTRAVENOUS at 08:16

## 2019-12-20 RX ADMIN — DEXMEDETOMIDINE HYDROCHLORIDE 4 MCG: 100 INJECTION, SOLUTION, CONCENTRATE INTRAVENOUS at 08:15

## 2019-12-20 RX ADMIN — Medication 2 G: at 08:23

## 2019-12-20 RX ADMIN — KETAMINE HYDROCHLORIDE 10 MG: 10 INJECTION, SOLUTION INTRAMUSCULAR; INTRAVENOUS at 09:06

## 2019-12-20 RX ADMIN — HEPARIN SODIUM 2000 UNITS: 1000 INJECTION, SOLUTION INTRAVENOUS; SUBCUTANEOUS at 09:13

## 2019-12-20 RX ADMIN — KETAMINE HYDROCHLORIDE 10 MG: 10 INJECTION, SOLUTION INTRAMUSCULAR; INTRAVENOUS at 09:13

## 2019-12-20 RX ADMIN — FAMOTIDINE 20 MG: 10 INJECTION, SOLUTION INTRAVENOUS at 21:15

## 2019-12-20 RX ADMIN — SODIUM CHLORIDE 3 ML/KG/HR: 900 INJECTION, SOLUTION INTRAVENOUS at 06:40

## 2019-12-20 RX ADMIN — PHENYLEPHRINE HYDROCHLORIDE 40 MCG/MIN: 10 INJECTION INTRAVENOUS at 08:15

## 2019-12-20 RX ADMIN — DEXMEDETOMIDINE HYDROCHLORIDE 4 MCG: 100 INJECTION, SOLUTION, CONCENTRATE INTRAVENOUS at 08:18

## 2019-12-20 RX ADMIN — SODIUM CHLORIDE: 900 INJECTION, SOLUTION INTRAVENOUS at 08:56

## 2019-12-20 RX ADMIN — DEXMEDETOMIDINE HYDROCHLORIDE 4 MCG: 100 INJECTION, SOLUTION, CONCENTRATE INTRAVENOUS at 08:39

## 2019-12-20 RX ADMIN — PROPOFOL 40 MCG/KG/MIN: 10 INJECTION, EMULSION INTRAVENOUS at 08:12

## 2019-12-20 RX ADMIN — KETAMINE HYDROCHLORIDE 10 MG: 10 INJECTION, SOLUTION INTRAMUSCULAR; INTRAVENOUS at 08:14

## 2019-12-20 RX ADMIN — FAMOTIDINE 20 MG: 10 INJECTION, SOLUTION INTRAVENOUS at 12:28

## 2019-12-20 RX ADMIN — DEXMEDETOMIDINE HYDROCHLORIDE 4 MCG: 100 INJECTION, SOLUTION, CONCENTRATE INTRAVENOUS at 08:17

## 2019-12-20 RX ADMIN — PROTAMINE SULFATE 110 MG: 10 INJECTION, SOLUTION INTRAVENOUS at 09:44

## 2019-12-20 RX ADMIN — DEXMEDETOMIDINE HYDROCHLORIDE 4 MCG: 100 INJECTION, SOLUTION, CONCENTRATE INTRAVENOUS at 08:33

## 2019-12-20 RX ADMIN — KETAMINE HYDROCHLORIDE 10 MG: 10 INJECTION, SOLUTION INTRAMUSCULAR; INTRAVENOUS at 09:19

## 2019-12-20 RX ADMIN — HEPARIN SODIUM 8000 UNITS: 1000 INJECTION, SOLUTION INTRAVENOUS; SUBCUTANEOUS at 09:04

## 2019-12-20 RX ADMIN — KETAMINE HYDROCHLORIDE 10 MG: 10 INJECTION, SOLUTION INTRAMUSCULAR; INTRAVENOUS at 08:20

## 2019-12-20 RX ADMIN — DEXMEDETOMIDINE HYDROCHLORIDE 4 MCG: 100 INJECTION, SOLUTION, CONCENTRATE INTRAVENOUS at 08:30

## 2019-12-20 RX ADMIN — DEXAMETHASONE SODIUM PHOSPHATE 4 MG: 4 INJECTION, SOLUTION INTRAMUSCULAR; INTRAVENOUS at 08:18

## 2019-12-20 RX ADMIN — Medication 10 ML: at 21:16

## 2019-12-20 RX ADMIN — DEXMEDETOMIDINE HYDROCHLORIDE 4 MCG: 100 INJECTION, SOLUTION, CONCENTRATE INTRAVENOUS at 08:19

## 2019-12-20 RX ADMIN — DEXMEDETOMIDINE HYDROCHLORIDE 4 MCG: 100 INJECTION, SOLUTION, CONCENTRATE INTRAVENOUS at 08:13

## 2019-12-20 RX ADMIN — SODIUM CHLORIDE: 900 INJECTION, SOLUTION INTRAVENOUS at 08:05

## 2019-12-20 RX ADMIN — PROPOFOL 20 MG: 10 INJECTION, EMULSION INTRAVENOUS at 08:16

## 2019-12-20 RX ADMIN — DEXMEDETOMIDINE HYDROCHLORIDE 4 MCG: 100 INJECTION, SOLUTION, CONCENTRATE INTRAVENOUS at 08:28

## 2019-12-20 RX ADMIN — PROPOFOL 20 MG: 10 INJECTION, EMULSION INTRAVENOUS at 08:19

## 2019-12-20 RX ADMIN — Medication 2 G: at 16:19

## 2019-12-20 RX ADMIN — KETAMINE HYDROCHLORIDE 10 MG: 10 INJECTION, SOLUTION INTRAMUSCULAR; INTRAVENOUS at 08:24

## 2019-12-20 NOTE — ANESTHESIA POSTPROCEDURE EVALUATION
Procedure(s):  TRANSCATHETER AORTIC VALVE REPLACEMENT RIGHT TRANSFEMORAL 26 S3 WITH PRE DEPLOYMENT BAV. Gerldine Prost MAC    Anesthesia Post Evaluation        Patient location during evaluation: PACU  Note status: Adequate. Level of consciousness: responsive to verbal stimuli and sleepy but conscious  Pain management: satisfactory to patient  Airway patency: patent  Anesthetic complications: no  Cardiovascular status: acceptable  Respiratory status: acceptable  Hydration status: acceptable  Comments: +Post-Anesthesia Evaluation and Assessment    Patient: Irma Mckeon MRN: [id number]  SSN: [id number]   YOB: 1934  Age: 80 y.o. Sex: male          Cardiovascular Function/Vital Signs    /49   Pulse 65   Temp 36.7 °C (98 °F)   Resp 22   Ht 5' 8\" (1.727 m)   Wt 81.1 kg (178 lb 13 oz)   SpO2 93%   BMI 27.19 kg/m²     Patient is status post Procedure(s):  TRANSCATHETER AORTIC VALVE REPLACEMENT RIGHT TRANSFEMORAL 26 S3 WITH PRE DEPLOYMENT BAV. Gerldine Prost Nausea/Vomiting: Controlled. Postoperative hydration reviewed and adequate. Pain:  Pain Scale 1: Numeric (0 - 10) (12/20/19 1130)  Pain Intensity 1: 0 (12/20/19 1130)   Managed. Neurological Status:   Neuro (WDL): Exceptions to WDL(hand tremors controlled well with medication) (12/20/19 0654)   At baseline. Mental Status and Level of Consciousness: Arousable. Pulmonary Status:   O2 Device: Room air (12/20/19 1130)   Adequate oxygenation and airway patent. Complications related to anesthesia: None    Post-anesthesia assessment completed. No concerns. I have evaluated the patient and the patient is stable and ready to be discharged from PACU . Signed By: Theo Luciano MD    12/20/2019        Vitals Value Taken Time   BP     Temp     Pulse 66 12/20/2019  3:17 PM   Resp 15 12/20/2019  3:17 PM   SpO2 92 % 12/20/2019  3:17 PM   Vitals shown include unvalidated device data.

## 2019-12-20 NOTE — CONSULTS
[facility] [place]: Zhane Tena  [phone]          Cardiology Consult/Progress Note      Requesting/referring provider: Dr. Felipa Dumont, Dr. Keith Ramirez  Reason for Consult: Mr. Dennis Rosario 79 isaortic valve disease    HPI: Teofilo Henry, a 80y.o. year-old who presents for evaluation of aortic valve disease . Has reported progressive exertional intolerance and shortness of breath over the past 1 year. Until 1 year ago he could walk 4 to 5 miles at a stretch. Now he reports shortness of breath walking 2 miles on a flat surface or even half a mile on an incline. I would consider this functional status consistent with NYHA class II symptoms. He denies any pedal edema, syncope, presyncope or chest discomfort. He is known to have a cardiac murmur for past 5 to 8 years. On his recent evaluation  thought that his murmur appears concerning for progression of aortic stenosis and frequently he was referred to . An echocardiogram was performed that confirmed severe aortic stenosis and he is referred here for subsequent management. His medical problems include gastroesophageal reflux disease and essential tremors. Otherwise he has no major medical comorbidities. Investigations:   ECG: Sinus rhythm frequent PACs mild intraventricular conduction delay with QRS of 110 ms. Echocardiogram: October 2019 personally reviewed: LV systolic function 55 to 21%. Severe aortic stenosis. Calculated aortic valve area 0.7 cm², peak velocity 5.3 m/s, peak gradient 110 mmHg, mean gradient 60 mmHg. Mild aortic regurgitation, mild tricuspid regurg without pulmonary hypertension. Carotid Dopplers: No significant obstructive disease  Cardiac catheterization: Severe single-vessel, mild to moderate single-vessel disease. Status post PCI to LAD with drug-eluting stent. Assessment/Plan:  1. Senile calcific severe aortic stenosis with NYHA class II limitation  2. Hyperlipidemia  3. Tremors   4. Severe CAD involving heavily calcified proximal to mid LAD status post rotational atherectomy and PCI with drug-eluting stent. Mr. Jennifer Simmons was seen at the request of Dr. Adriana Michel and . He is not demonstrating symptoms from severe senile calcific aortic stenosis. I informed the patient that in the absence of any definitive therapy severe symptomatic aortic stenosis confers a 50% 2 to 5-year mortality. I specifically discussed TAVR related procedural risks such as vascular complication, risk of stroke, risk of pacemaker need post TAVR, risk of conversion to open surgery, death from TAVR procedure. I also discussed the expected benefits include improvement in functional status, reduction in angina, improvement in exertional tolerance from the procedure. Finally I reviewed expected length of stay in the recovery with the patient based on if the perioperative course is complicated versus uncomplicated. Patient and wife voiced understanding and are willing to proceed with further course of action. Confirm that he is a good candidate for for transfemoraltranscatheter aortic valve placement to right femoral artery. Patient his aortic annulus is consistent with 26 mm S3 valve size. Postoperative therapy after transcatheter aortic valve replacement would include dual antiplatelet therapy due to recent PCI. [x]    High complexity decision making was performed  []    Patient is at high-risk of decompensation with multiple organ involvement  He  has a past medical history of GERD (gastroesophageal reflux disease), Hyperactivity of bladder, Hypercholesterolemia, Macular degeneration disease, Skin cancer (melanoma) ([address] and Tremor. He also has no past medical history of Hypertension. Review of system:Patient reports no PND/Orthpnea/syncope. He reports no cough/fever/focal neurological deficits/abdominal pain. All other systems negative except as above.    Family History   Problem Relation Age of Onset    Cancer Father     Lung Cancer Father         smoker    Heart Disease Brother     Diabetes Brother     Diabetes Maternal Grandfather     Prostate Cancer Brother       Social History     Socioeconomic History    Marital status:      Spouse name: Not on file    Number of children: Not on file    Years of education: Not on file    Highest education level: Not on file   Tobacco Use    Smoking status: Never Smoker    Smokeless tobacco: Never Used   Substance and Sexual Activity    Alcohol use: Yes     Comment: beer or glass of wine nightly     Drug use: Never    Sexual activity: Not Currently     Partners: Female      PE  Vitals:    12/20/19 1051 12/20/19 1130 12/20/19 1200 12/20/19 1230   BP: 121/60 113/69 95/51 104/55   Pulse: 67 71 71 68   Resp: 14 15 18 18   Temp:  98 °F (36.7 °C)     SpO2: 96% 93% 91% 93%   Weight:       Height:        Body mass index is 27.19 kg/m². General:    Alert, cooperative, no distress. Psychiatric:    Normal Mood and affect    Eye/ENT:      Pupils equal, No asymmetry, Conjunctival pink. Able to hear voice at normal amplitude   Lungs:      Visibly symmetric chest expansion, No palpable tenderness. Clear to auscultation bilaterally. Heart[de-identified]    Regular rate and rhythm, S1, normal, S2 significantly attenuated, late peaking midsystolic murmur best heard in the right upper sternal border with radiation to the apex of the heart. No, click, rub or gallop. No JVD, Normal palpable peripheral pulses. No cyanosis   Abdomen:     Soft, non-tender. Bowel sounds normal. No masses,  No      organomegaly. Extremities:   Extremities normal, atraumatic, no edema. Neurologic:   CN II-XII grossly intact.  No gross focal deficits           Recent Labs:  No results found for: CHOL, CHOLX, CHLST, CHOLV, 499366, HDL, HDLP, LDL, LDLC, DLDLP, TGLX, TRIGL, TRIGP, CHHD, CHHDX  Lab Results   Component Value Date/Time    Creatinine 0.61 (L) 12/20/2019 10:59 AM     Lab Results   Component Value Date/Time    BUN 14 12/20/2019 10:59 AM     Lab Results   Component Value Date/Time    Potassium 4.0 12/20/2019 10:59 AM     Lab Results   Component Value Date/Time    Hemoglobin A1c 5.4 12/17/2019 10:15 AM     Lab Results   Component Value Date/Time    HGB 13.0 12/20/2019 10:59 AM     Lab Results   Component Value Date/Time    PLATELET 836 (L) 37/21/5225 10:59 AM       Reviewed:  Past Medical History:   Diagnosis Date    GERD (gastroesophageal reflux disease)     Hyperactivity of bladder     Hypercholesterolemia     Macular degeneration disease     Skin cancer (melanoma) (HCC)     Tremor      Social History     Tobacco Use   Smoking Status Never Smoker   Smokeless Tobacco Never Used     Social History     Substance and Sexual Activity   Alcohol Use Yes    Comment: beer or glass of wine nightly      Allergies   Allergen Reactions    Penicillins Hives     Family History   Problem Relation Age of Onset    Cancer Father     Lung Cancer Father         smoker    Heart Disease Brother     Diabetes Brother     Diabetes Maternal Grandfather     Prostate Cancer Brother         Current Facility-Administered Medications   Medication Dose Route Frequency    alteplase (CATHFLO) 1 mg in sterile water (preservative free) 1 mL injection  1 mg InterCATHeter PRN    bacitracin 500 unit/gram packet 1 Packet  1 Packet Topical PRN    sodium chloride (NS) flush 5-40 mL  5-40 mL IntraVENous Q8H    sodium chloride (NS) flush 5-40 mL  5-40 mL IntraVENous PRN    0.45% sodium chloride infusion  10 mL/hr IntraVENous CONTINUOUS    0.9% sodium chloride infusion  9 mL/hr IntraVENous CONTINUOUS    acetaminophen (TYLENOL) tablet 650 mg  650 mg Oral Q4H PRN    traMADol (ULTRAM) tablet  mg   mg Oral Q6H PRN    oxyCODONE-acetaminophen (PERCOCET) 5-325 mg per tablet 1-2 Tab  1-2 Tab Oral Q4H PRN    morphine injection 2 mg  2 mg IntraVENous Q2H PRN    naloxone [facility]) injection 0.4 mg  0.4 mg IntraVENous PRN    ceFAZolin (ANCEF) 2 g/20 mL in sterile water IV syringe  2 g IntraVENous Q8H    ondansetron (ZOFRAN) injection 4 mg  4 mg IntraVENous Q4H PRN    albuterol (PROVENTIL VENTOLIN) nebulizer solution 2.5 mg  2.5 mg Nebulization Q4H PRN    [START ON 12/21/2019] aspirin chewable tablet 81 mg  81 mg Oral DAILY    famotidine (PF) (PEPCID) 20 mg in 0.9% sodium chloride 10 mL injection  20 mg IntraVENous Q12H    [START ON 12/21/2019] famotidine (PEPCID) tablet 20 mg  20 mg Oral Q12H    [START ON 12/21/2019] magnesium oxide (MAG-OX) tablet 400 mg  400 mg Oral BID    calcium chloride 1 g in 0.9% sodium chloride 250 mL IVPB  1 g IntraVENous PRN    bisacodyl (DULCOLAX) suppository 10 mg  10 mg Rectal DAILY PRN    [START ON 12/21/2019] senna-docusate (PERICOLACE) 8.6-50 mg per tablet 1 Tab  1 Tab Oral BID    ELECTROLYTE REPLACEMENT NOTE: Nurse to review Serum Potassium and Magnesuim levels and Initiate Electrolyte Replacement Protocol as needed  1 Each Other PRN    diphenhydrAMINE (BENADRYL) capsule 25 mg  25 mg Oral QHS PRN    diphenhydrAMINE (BENADRYL) injection 25 mg  25 mg IntraVENous Q6H PRN    melatonin tablet 3 mg  3 mg Oral QHS PRN    potassium chloride 20 mEq in 50 ml IVPB  20 mEq IntraVENous Q2H PRN    potassium chloride 20 mEq in 50 ml IVPB  20 mEq IntraVENous Q2H PRN    niCARdipine (CARDENE) 25 mg in 0.9% sodium chloride 250 mL infusion  0-15 mg/hr IntraVENous TITRATE    PHENYLephrine (MILENA-SYNEPHRINE) 30 mg in 0.9% sodium chloride 250 mL infusion   mcg/min IntraVENous TITRATE       Brandon Gutierrez MD12/20/19 There are other unrelated non-urgent complaints, but due to the busy schedule and the amount of time I've already spent with him, time does not permit me to address these routine issues at today's visit. I've requested another appointment to review these additional issues.     ATTENTION:   This medical record was transcribed using an electronic medical records/speech recognition system. Although proofread, it may and can contain electronic, spelling and other errors. Corrections may be executed at a later time. Please feel free to contact us for any clarifications as needed.     [employer] [facility] [place]  [address], [address], [address] [place] [address] Avenue

## 2019-12-20 NOTE — INTERVAL H&P NOTE
H&P Update: 
Nikki Kapoor was seen and examined. History and physical has been reviewed. The patient has been examined.  There have been no significant clinical changes since the completion of the originally dated History and Physical.

## 2019-12-20 NOTE — ANESTHESIA PREPROCEDURE EVALUATION
Relevant Problems   No relevant active problems       Anesthetic History   No history of anesthetic complications            Review of Systems / Medical History  Patient summary reviewed, nursing notes reviewed and pertinent labs reviewed    Pulmonary  Within defined limits                 Neuro/Psych   Within defined limits           Cardiovascular  Within defined limits    Valvular problems/murmurs: aortic stenosis        CAD    Exercise tolerance: <4 METS     GI/Hepatic/Renal  Within defined limits   GERD           Endo/Other  Within defined limits      Cancer     Other Findings              Physical Exam    Airway  Mallampati: II  TM Distance: > 6 cm  Neck ROM: normal range of motion   Mouth opening: Normal     Cardiovascular  Regular rate and rhythm,  S1 and S2 normal,  no murmur, click, rub, or gallop        Murmur: Grade 2, Aortic area     Dental  No notable dental hx       Pulmonary  Breath sounds clear to auscultation               Abdominal  GI exam deferred       Other Findings            Anesthetic Plan    ASA: 4  Anesthesia type: MAC    Monitoring Plan: Arterial line      Induction: Intravenous  Anesthetic plan and risks discussed with: Patient

## 2019-12-20 NOTE — Clinical Note
Right femoral artery. Accessed successfully. using fluoro and ultrasound guidance. RFA ACCESSED USING ULTRASOUND, FLUORO AND 4FR MICROPUNCTURE KIT.  6FR X 25CM SHEATH INSERTED RFA

## 2019-12-20 NOTE — PROGRESS NOTES
1040- patient arrived to unit from OR. He is awake and oriented, VSS. OR RN and this RN performed a groin check. Right groin oozy, no hematoma or pain at site. Blood outline marked. 1130- Bedside and Verbal shift change report given to Darian Rich RN (oncoming nurse) by Concha Carias RN (offgoing nurse). Report included the following information SBAR, Kardex, Recent Results and Cardiac Rhythm NSR, BBB.

## 2019-12-20 NOTE — PROGRESS NOTES
Occupational Therapy   Orders received, chart review completed. Note patient POD #0. Per pathway, OT will follow up on Monday for evaluation. Recommend OOB to chair three times a day for meals and self-completion of ADLs as able and medically stable. Thank you.

## 2019-12-20 NOTE — PROCEDURES
PROCEDURALISTS:?   Surgeon 1. Mireille Alas MD?  CoSurgeon 1. Mehrdad Ortiz MD.     PROCEDURES:    1. Angiography of the ascending aorta and aortoiliac bifurcation  2. Temporary transvenous pacemaker insertion  3. Left heart catheterization  4. Implantation of a 26 mm Watson S3 transcatheter aortic valve via the right transfemoral approach  5. 14F right?femoral artery access with Perclose pre-closure / 6F left femoral vein access with manual compression / 6F left femoral artery access with Perclose closure    INDICATIONS:?Malachi Webber is a 80 y.o. ?old male    with severe symptomatic aortic stenosis, NYHA Class III symptom status. ? He is referred for transfemoral TAVR procedure. He was deemed as intermediate risk for SAVR. PRE-OP DIAGNOSIS:    1. Severe, symptomatic aortic stenosis (NYHA, Class III)  2. Coronary artery disease  3. No Cardiomyopathy    POST-OP DIAGNOSIS:  1. Status post successful implantation of a 26 mm Watson S3 aortic valve via the right transfemoral approach  2. Post operative conduction block in the form of LBBB with QRS <150 msec and normal [place]    PROCEDURAL DETAILS: The risks (death, myocardial infarction, stroke, bleeding, vascular complications, renal dysfunction, allergic reactions, and other), benefits, and alternatives were explained and discussed. Signed, informed consent was obtained. The patient was placed on the table and prepped and draped in the usual fashion. ARTERIAL ACCESS:?     - Right: Right femoral artery access was obtained using ultrasound and a micropuncture needle and sheath system. ? Angiography confirmed adequate position within the mid right CFA without significant angiographic stenosis or irregularity. Pre-close technique was performed using two orthogonally positioned Perclose closure devices. ? A 6F sheath was placed before it was later up-sized to the 16F E-Sheath?system. ?  Post-procedure, the sheath was removed and the arteriotomy was closed using the pre-close technique. Final hemostasis was excellent. - Left: A 6 [place] sheath was placed in the left common femoral artery without difficulty. ? Post-procedure, the sheath was removed and hemostasis was achieved with a Perclose closure device. - A 7F sheath was placed in the left? common femoral vein without difficulty. Post-procedure the sheath was removed and manual compression was applied to achieve hemostasis. PROCEDURAL MEDICATIONS:?     Conscious sedation - Moderate levelconscious sedation. Please see Anesthesia record for full details. Local anesthesia - 1% lidocaine was administered to the bilateral groins. ?? CATHETERS:?     Ascending aortic and aortoiliac angiography: 5F Pigtail catheter  Left heart catheterization: 5F pigtail/JR caheters    CONTRAST VOLUME:?65 mL Omnipaque  BLOOD LOSS:Minimal  COMPLICATIONS:?None  SPECIMENS:?None    FINDINGS:  ANGIOGRAPHY OF THE ASCENDING AORTA:? There is significant calcification of the aortic valve and annulus. ? Appropriate co-planar views were identified for valve deployment. Post-deployment, the S3 valve appeared well positioned with appropriate function and only trace to mild para-valvular aortic regurgitation. ? Coronary arteries remained patent without obstruction. ? AORTOILIAC ANGIOGRAPHY:?The abdominal aorta and bilateral iliac arteries are widely patent without evidence of aneurysm or stenosis. ? There is no evidence of dissection, perforation or other complications. ?     INTERVENTION:    - A temporary transvenous pacemaker wire was inserted via the left common femoral vein access and positioned in the RV apical position. ? Appropriate position and function were confirmed. Rapid ventricular pacing (180BPM) was performed in concert with the valve implantations procedures. - Left heart catheterization: aortic valve crossed with a 0.035 inch straight wire and this wire was exchanged out for an Confida wire using a 5F angled pigtail catheter.  Systemic heparin was administered to maintain an ACT between 250-300 seconds. - Transaortic valve implantation: Rapid pacing performed in coordination with deployment of a 29mm Watson Alfreda S3 valve. ? Post-deployment angiography and transthoracic echocardiography confirmed appropriate position and function, there was trace-to-mild para-valvular regurgitation. LV function was preserved. ? There was no pericardial effusion. ?     - The temporary pacemaker was removed. CONCLUSIONS:  1. Successful transfemoral aortic valve implantation (Watson 26mm S3) via the right transfemoral access. 2. Temporary transvenous pacemaker insertion with evidence of post-TAVR conduction abnormality-LBBB  3. 14F?CFA, 6F left CFA, and 6F left? CFV access. Right CFA pre-close and left? CFA Perclose arteriotomy closures.     COMMENTS: Recommend standard post-cardiac catheterization care and continued secondary risk factor modification.    - Admit to CVICU  - Aspirin and Plavix  - Echocardiogram, labs and ECG in AM  - Early ambulation

## 2019-12-20 NOTE — Clinical Note
Left femoral vein. Accessed successfully. using fluoro and ultrasound guidance. LFV ACCESSED USING ULTRASOUND, FLUORO AND 4FR MICROPUNCTURE KIT.  6FR X 25CM SHEATH INSERTED LFV

## 2019-12-20 NOTE — Clinical Note
Temporary pacemaker inserted. Inserted through the left groin. Temporary Pacer pacing in the right ventricle. Device secured using: tegaderm.  TESTED @ 80

## 2019-12-20 NOTE — OP NOTES
Pre-procedure Diagnoses   Severe aortic stenosis [I35.0]   Post-procedure Diagnoses   S/P TAVR (transcatheter aortic valve replacement) [Z95.2]   Procedures   TRANSCATHETER AORTIC VALVE REPLACEMENT [[id number] (Custom)]           []Hide copied text    []Carlosver for details  PROCEDURALISTS:?   Surgeon 1. Dary Negro MD?  CoSurgeon 1. Janet Hidalgo MD.    CoSurgeon 1. Dary Negro MD  PROCEDURES:     1. Angiography of the ascending aorta and aortoiliac bifurcation  2. Temporary transvenous pacemaker insertion  3. Left heart catheterization  4. Implantation of a 26 mm Watson S3 transcatheter aortic valve via the right transfemoral approach  5. 14F right?femoral artery access with Perclose pre-closure / 6F left femoral vein access with manual compression / 6F left femoral artery access with Perclose closure     INDICATIONS:?Malachi Abdalla is a 80 y.o. ?old male    with severe symptomatic aortic stenosis, NYHA Class III symptom status. ? He is referred for transfemoral TAVR procedure. He was deemed as intermediate risk for SAVR.     PRE-OP DIAGNOSIS:     1. Severe, symptomatic aortic stenosis (NYHA, Class III)  2. Coronary artery disease  3. No Cardiomyopathy     POST-OP DIAGNOSIS:  1. Status post successful implantation of a 26 mm Watson S3 aortic valve via the right transfemoral approach  2. Post operative conduction block in the form of LBBB with QRS <150 msec and normal [place]     PROCEDURAL DETAILS: The risks (death, myocardial infarction, stroke, bleeding, vascular complications, renal dysfunction, allergic reactions, and other), benefits, and alternatives were explained and discussed. Signed, informed consent was obtained. The patient was placed on the table and prepped and draped in the usual fashion.     ARTERIAL ACCESS:?      - Right: Right femoral artery access was obtained using ultrasound and a micropuncture needle and sheath system. ?  Angiography confirmed adequate position within the mid right CFA without significant angiographic stenosis or irregularity. Pre-close technique was performed using two orthogonally positioned Perclose closure devices. ? A 6F sheath was placed before it was later up-sized to the 16F E-Sheath?system. ? Post-procedure, the sheath was removed and the arteriotomy was closed using the pre-close technique. Final hemostasis was excellent.     - Left: A 6 [place] sheath was placed in the left common femoral artery without difficulty. ? Post-procedure, the sheath was removed and hemostasis was achieved with a Perclose closure device.     - A 7F sheath was placed in the left? common femoral vein without difficulty. Post-procedure the sheath was removed and manual compression was applied to achieve hemostasis.     PROCEDURAL MEDICATIONS:?      Conscious sedation - Moderate levelconscious sedation. Please see Anesthesia record for full details. Local anesthesia - 1% lidocaine was administered to the bilateral groins. ??      CATHETERS:?      Ascending aortic and aortoiliac angiography: 5F Pigtail catheter  Left heart catheterization: 5F pigtail/JR caheters     CONTRAST VOLUME:?65 mL Omnipaque  BLOOD LOSS:Minimal  COMPLICATIONS:?None  SPECIMENS:?None     FINDINGS:  ANGIOGRAPHY OF THE ASCENDING AORTA:? There is significant calcification of the aortic valve and annulus. ? Appropriate co-planar views were identified for valve deployment. Post-deployment, the S3 valve appeared well positioned with appropriate function and only trace to mild para-valvular aortic regurgitation. ? Coronary arteries remained patent without obstruction. ?      AORTOILIAC ANGIOGRAPHY:?The abdominal aorta and bilateral iliac arteries are widely patent without evidence of aneurysm or stenosis. ? There is no evidence of dissection, perforation or other complications. ?      INTERVENTION:     - A temporary transvenous pacemaker wire was inserted via the left common femoral vein access and positioned in the RV apical position. ?  Appropriate position and function were confirmed. Rapid ventricular pacing (180BPM) was performed in concert with the valve implantations procedures.     - Left heart catheterization: aortic valve crossed with a 0.035 inch straight wire and this wire was exchanged out for an Confida wire using a 5F angled pigtail catheter. Systemic heparin was administered to maintain an ACT between 250-300 seconds.     - Transaortic valve implantation: Rapid pacing performed in coordination with deployment of a 29mm Watson Alfreda S3 valve. ? Post-deployment angiography and transthoracic echocardiography confirmed appropriate position and function, there was trace-to-mild para-valvular regurgitation. LV function was preserved. ? There was no pericardial effusion. ?      - The temporary pacemaker was removed.     CONCLUSIONS:  1. Successful transfemoral aortic valve implantation (Watson 26mm S3) via the right transfemoral access. 2. Temporary transvenous pacemaker insertion with evidence of post-TAVR conduction abnormality-LBBB  3. 14F?CFA, 6F left CFA, and 6F left? CFV access. Right CFA pre-close and left? CFA Perclose arteriotomy closures.

## 2019-12-20 NOTE — Clinical Note
Right femoral vein. Accessed successfully. using fluoro and ultrasound guidance. RFV ACCESSED USING ULTRASOUND, FLUORO AND 4FR MICROPUNCTURE KIT.  6FR X 10CM SHEATH INSERTED RFV

## 2019-12-20 NOTE — Clinical Note
Left femoral artery. Accessed successfully. using fluoro and ultrasound guidance. LFA ACCESSED USING ULTRASOUND, FLUORO AND 4FR MICROPUNCTURE KIT.  6FR X 10CM SHEATH INSERTED LFA

## 2019-12-21 ENCOUNTER — APPOINTMENT (OUTPATIENT)
Dept: GENERAL RADIOLOGY | Age: 84
DRG: 267 | End: 2019-12-21
Attending: NURSE PRACTITIONER
Payer: MEDICARE

## 2019-12-21 ENCOUNTER — APPOINTMENT (OUTPATIENT)
Dept: NON INVASIVE DIAGNOSTICS | Age: 84
DRG: 267 | End: 2019-12-21
Attending: THORACIC SURGERY (CARDIOTHORACIC VASCULAR SURGERY)
Payer: MEDICARE

## 2019-12-21 ENCOUNTER — APPOINTMENT (OUTPATIENT)
Dept: GENERAL RADIOLOGY | Age: 84
DRG: 267 | End: 2019-12-21
Attending: PHYSICIAN ASSISTANT
Payer: MEDICARE

## 2019-12-21 VITALS
HEIGHT: 68 IN | SYSTOLIC BLOOD PRESSURE: 105 MMHG | HEART RATE: 67 BPM | RESPIRATION RATE: 18 BRPM | OXYGEN SATURATION: 95 % | TEMPERATURE: 98.1 F | DIASTOLIC BLOOD PRESSURE: 76 MMHG | WEIGHT: 173 LBS | BODY MASS INDEX: 26.22 KG/M2

## 2019-12-21 LAB
ABO + RH BLD: NORMAL
ALBUMIN SERPL-MCNC: 3.1 G/DL (ref 3.5–5)
ALBUMIN/GLOB SERPL: 1.1 {RATIO} (ref 1.1–2.2)
ALP SERPL-CCNC: 70 U/L (ref 45–117)
ALT SERPL-CCNC: 27 U/L (ref 12–78)
ANION GAP SERPL CALC-SCNC: 4 MMOL/L (ref 5–15)
AST SERPL-CCNC: 29 U/L (ref 15–37)
ATRIAL RATE: 63 BPM
AV VELOCITY RATIO: 0.43
AV VTI RATIO: 0.5
BILIRUB SERPL-MCNC: 0.4 MG/DL (ref 0.2–1)
BLD PROD TYP BPU: NORMAL
BLD PROD TYP BPU: NORMAL
BLOOD GROUP ANTIBODIES SERPL: NORMAL
BPU ID: NORMAL
BPU ID: NORMAL
BUN SERPL-MCNC: 18 MG/DL (ref 6–20)
BUN/CREAT SERPL: 30 (ref 12–20)
CALCIUM SERPL-MCNC: 8.5 MG/DL (ref 8.5–10.1)
CALCULATED P AXIS, ECG09: 9 DEGREES
CALCULATED R AXIS, ECG10: 1 DEGREES
CALCULATED T AXIS, ECG11: -42 DEGREES
CHLORIDE SERPL-SCNC: 106 MMOL/L (ref 97–108)
CO2 SERPL-SCNC: 28 MMOL/L (ref 21–32)
CREAT SERPL-MCNC: 0.61 MG/DL (ref 0.7–1.3)
CROSSMATCH RESULT,%XM: NORMAL
CROSSMATCH RESULT,%XM: NORMAL
DIAGNOSIS, 93000: NORMAL
ECHO AO ROOT DIAM: 2.36 CM
ECHO AV AREA PEAK VELOCITY: 1.2 CM2
ECHO AV AREA VTI: 1.4 CM2
ECHO AV MEAN GRADIENT: 9.4 MMHG
ECHO AV MEAN VELOCITY: 1.43 M/S
ECHO AV PEAK GRADIENT: 19 MMHG
ECHO AV PEAK VELOCITY: 217.79 CM/S
ECHO AV VTI: 39.07 CM
ECHO EST RA PRESSURE: 5 MMHG
ECHO LA AREA 4C: 27.5 CM2
ECHO LA MAJOR AXIS: 4.76 CM
ECHO LA TO AORTIC ROOT RATIO: 2.01
ECHO LA VOL 2C: 77.52 ML (ref 18–58)
ECHO LA VOL 4C: 96.24 ML (ref 18–58)
ECHO LA VOL BP: 92.38 ML (ref 18–58)
ECHO LA VOL/BSA BIPLANE: 48.07 ML/M2 (ref 16–28)
ECHO LA VOLUME INDEX A2C: 40.34 ML/M2 (ref 16–28)
ECHO LA VOLUME INDEX A4C: 50.08 ML/M2 (ref 16–28)
ECHO LV INTERNAL DIMENSION DIASTOLIC: 5.52 CM (ref 4.2–5.9)
ECHO LV INTERNAL DIMENSION SYSTOLIC: 3.36 CM
ECHO LV IVSD: 1.05 CM (ref 0.6–1)
ECHO LV MASS 2D: 245.8 G (ref 88–224)
ECHO LV MASS INDEX 2D: 127.9 G/M2 (ref 49–115)
ECHO LV POSTERIOR WALL DIASTOLIC: 0.9 CM (ref 0.6–1)
ECHO LVOT DIAM: 1.9 CM
ECHO LVOT PEAK GRADIENT: 3.6 MMHG
ECHO LVOT PEAK VELOCITY: 94.45 CM/S
ECHO LVOT SV: 54.6 ML
ECHO LVOT VTI: 19.27 CM
ECHO PULMONARY ARTERY SYSTOLIC PRESSURE (PASP): 38.7 MMHG
ECHO PV MAX VELOCITY: 138.3 CM/S
ECHO PV PEAK GRADIENT: 7.7 MMHG
ECHO RIGHT VENTRICULAR SYSTOLIC PRESSURE (RVSP): 38.7 MMHG
ECHO RV INTERNAL DIMENSION: 4.01 CM
ECHO RV TAPSE: 1.99 CM (ref 1.5–2)
ECHO TV REGURGITANT MAX VELOCITY: 290.4 CM/S
ECHO TV REGURGITANT PEAK GRADIENT: 33.7 MMHG
ERYTHROCYTE [DISTWIDTH] IN BLOOD BY AUTOMATED COUNT: 13.1 % (ref 11.5–14.5)
GLOBULIN SER CALC-MCNC: 2.9 G/DL (ref 2–4)
GLUCOSE SERPL-MCNC: 91 MG/DL (ref 65–100)
HCT VFR BLD AUTO: 37.8 % (ref 36.6–50.3)
HGB BLD-MCNC: 12.4 G/DL (ref 12.1–17)
LVFS 2D: 39.07 %
LVOT MG: 2.16 MMHG
LVOT MV: 0.71 CM/S
MAGNESIUM SERPL-MCNC: 1.8 MG/DL (ref 1.6–2.4)
MCH RBC QN AUTO: 30 PG (ref 26–34)
MCHC RBC AUTO-ENTMCNC: 32.8 G/DL (ref 30–36.5)
MCV RBC AUTO: 91.5 FL (ref 80–99)
NRBC # BLD: 0 K/UL (ref 0–0.01)
NRBC BLD-RTO: 0 PER 100 WBC
P-R INTERVAL, ECG05: 158 MS
PLATELET # BLD AUTO: 116 K/UL (ref 150–400)
PMV BLD AUTO: 11 FL (ref 8.9–12.9)
POTASSIUM SERPL-SCNC: 3.5 MMOL/L (ref 3.5–5.1)
PROT SERPL-MCNC: 6 G/DL (ref 6.4–8.2)
Q-T INTERVAL, ECG07: 482 MS
QRS DURATION, ECG06: 122 MS
QTC CALCULATION (BEZET), ECG08: 493 MS
RBC # BLD AUTO: 4.13 M/UL (ref 4.1–5.7)
SODIUM SERPL-SCNC: 138 MMOL/L (ref 136–145)
SPECIMEN EXP DATE BLD: NORMAL
STATUS OF UNIT,%ST: NORMAL
STATUS OF UNIT,%ST: NORMAL
UNIT DIVISION, %UDIV: 0
UNIT DIVISION, %UDIV: 0
VENTRICULAR RATE, ECG03: 63 BPM
WBC # BLD AUTO: 7.6 K/UL (ref 4.1–11.1)

## 2019-12-21 PROCEDURE — 85027 COMPLETE CBC AUTOMATED: CPT

## 2019-12-21 PROCEDURE — 93306 TTE W/DOPPLER COMPLETE: CPT

## 2019-12-21 PROCEDURE — 93005 ELECTROCARDIOGRAM TRACING: CPT

## 2019-12-21 PROCEDURE — 97161 PT EVAL LOW COMPLEX 20 MIN: CPT

## 2019-12-21 PROCEDURE — 36415 COLL VENOUS BLD VENIPUNCTURE: CPT

## 2019-12-21 PROCEDURE — 80053 COMPREHEN METABOLIC PANEL: CPT

## 2019-12-21 PROCEDURE — 97116 GAIT TRAINING THERAPY: CPT

## 2019-12-21 PROCEDURE — 71045 X-RAY EXAM CHEST 1 VIEW: CPT

## 2019-12-21 PROCEDURE — 74011250637 HC RX REV CODE- 250/637: Performed by: NURSE PRACTITIONER

## 2019-12-21 PROCEDURE — 83735 ASSAY OF MAGNESIUM: CPT

## 2019-12-21 PROCEDURE — 71046 X-RAY EXAM CHEST 2 VIEWS: CPT

## 2019-12-21 PROCEDURE — 74011250636 HC RX REV CODE- 250/636: Performed by: NURSE PRACTITIONER

## 2019-12-21 RX ORDER — ACETAMINOPHEN 325 MG/1
650 TABLET ORAL
Qty: 60 TAB | Refills: 2 | Status: SHIPPED | OUTPATIENT
Start: 2019-12-21

## 2019-12-21 RX ORDER — TRAMADOL HYDROCHLORIDE 50 MG/1
50-100 TABLET ORAL
Qty: 12 TAB | Refills: 0 | Status: SHIPPED | OUTPATIENT
Start: 2019-12-21 | End: 2019-12-24

## 2019-12-21 RX ADMIN — Medication 2 G: at 00:09

## 2019-12-21 RX ADMIN — ASPIRIN 81 MG 81 MG: 81 TABLET ORAL at 08:05

## 2019-12-21 RX ADMIN — SENNOSIDES AND DOCUSATE SODIUM 1 TABLET: 8.6; 5 TABLET ORAL at 08:05

## 2019-12-21 RX ADMIN — FAMOTIDINE 20 MG: 20 TABLET ORAL at 08:05

## 2019-12-21 RX ADMIN — Medication 10 ML: at 07:33

## 2019-12-21 RX ADMIN — Medication 2 G: at 08:05

## 2019-12-21 RX ADMIN — MAGNESIUM OXIDE TAB 400 MG (241.3 MG ELEMENTAL MG) 400 MG: 400 (241.3 MG) TAB at 08:05

## 2019-12-21 NOTE — PROGRESS NOTES
Problem: Falls - Risk of  Goal: *Absence of Falls  Description  Document Emily Dear Fall Risk and appropriate interventions in the flowsheet. Outcome: Progressing Towards Goal  Note: Fall Risk Interventions:            Medication Interventions: Evaluate medications/consider consulting pharmacy    Elimination Interventions: Call light in reach              Problem: Post-procedure,Day of TAVR  Goal: *Stable Cardiac Rhythm  Outcome: Progressing Towards Goal  Note:   NSR w/ BBB.    Goal: *Hemodynamically stable  Outcome: Progressing Towards Goal  Goal: *Optimal pain control at patient's stated goal  Outcome: Progressing Towards Goal  Note:   No complaints of pain   Goal: *Lungs clear or at baseline  Outcome: Progressing Towards Goal  Note:   PT on room air  Goal: *Demonstrates progressive activity  Outcome: Progressing Towards Goal  Goal: *Procedure site is without bleeding and signs of infection six hours post sheath removal  Outcome: Progressing Towards Goal  Goal: *Pulses palpable, skin color within defined limits, skin temperature warm  Outcome: Progressing Towards Goal  Goal: *Bilateral lower extremities neurovascularly intact  Outcome: Progressing Towards Goal

## 2019-12-21 NOTE — PROGRESS NOTES
Physical Therapy Note    Patient currently receiving ECHO. Will re-attempt PT evaluation as able.     Thank you,  Christiano NIETOT

## 2019-12-21 NOTE — PROGRESS NOTES
2000- Bedside and Verbal shift change report given to [address] [facility] by Ashly Gutierrez (offgoing nurse). Report included the following information SBAR, Procedure Summary, Intake/Output, Recent Results and Cardiac Rhythm NSR w/ BBB.     0000- No change from previous assessment    0400- Pt reassessed. No change. EKG performed. 0800- Bedside and Verbal shift change report given to Ashly Gutier[facility] by Lazarus Gusman RN (offgoing nurse). Report included the following information SBAR, Procedure Summary, Intake/Output, Recent Results and Cardiac Rhythm NSR w/ BBB.

## 2019-12-21 NOTE — DISCHARGE SUMMARY
[facility]   Discharge Summary     Patient ID:  Heriberto Jackson  [id number]  87 y.o.  1934    Admit date: 12/20/2019    Discharge date: 12/21/2019     Admitting Physician: Fazal Hyman MD     Referring Cardiologist: Ridge Zarate    PCP: Sunni Sherman MD    Admitting Diagnoses: AS    Discharge Diagnoses:     Hospital Problems  Date Reviewed: 11/6/2019          Codes Class Noted POA    AS (aortic stenosis) ICD-10-CM: I35.0  ICD-9-CM: 424.1  12/20/2019 Unknown        Aortic valve stenosis ICD-10-CM: I35.0  ICD-9-CM: 424.1  11/5/2019 Yes              Discharged Condition: stable    Disposition: home, see patient instructions for treatment and plan    Procedures for this admission:  Procedure(s):  TRANSCATHETER AORTIC VALVE REPLACEMENT RIGHT TRANSFEMORAL 26 S3 WITH PRE DEPLOYMENT BAV. Discharge Medications:      My Medications      START taking these medications      Instructions Each Dose to Equal Morning Noon Evening Bedtime   acetaminophen 325 mg tablet  Commonly known as:  TYLENOL    Your last dose was: Your next dose is: Take 2 Tabs by mouth every four (4) hours as needed for Pain or Fever. 650 mg                 traMADol 50 mg tablet  Commonly known as:  ULTRAM    Your last dose was: Your next dose is: Take 1-2 Tabs by mouth every six (6) hours as needed for Pain for up to 3 days. Max Daily Amount: 400 mg.    mg                    CONTINUE taking these medications      Instructions Each Dose to Equal Morning Noon Evening Bedtime   aspirin delayed-release 81 mg tablet    Your last dose was: Your next dose is: Take 1 Tab by mouth daily for 360 days. 81 mg                 atorvastatin 20 mg tablet  Commonly known as:  LIPITOR    Your last dose was: Your next dose is: Take 2 Tabs by mouth daily for 360 days. 40 mg                 clopidogrel 75 mg Tab  Commonly known as:  PLAVIX    Your last dose was:       Your next dose is: Take 1 Tab by mouth daily for 360 days. 75 mg                 COQ10 (UBIQUINOL) PO    Your last dose was: Your next dose is: Take  by mouth.                  cyanocobalamin 1,000 mcg tablet    Your last dose was: Your next dose is: Take 1,000 mcg by mouth daily. 1,000 mcg                 DETROL LA 4 mg ER capsule  Generic drug:  tolterodine ER    Your last dose was: Your next dose is: Take 1 Cap by mouth daily. 4 mg                 famotidine 20 mg tablet  Commonly known as:  PEPCID    Your last dose was: Your next dose is: Take 1 Tab by mouth nightly. 20 mg                 folic acid 1 mg tablet  Commonly known as:  FOLVITE    Your last dose was: Your next dose is: Take  by mouth daily. magnesium 250 mg Tab    Your last dose was: Your next dose is: Take  by mouth.                  magnesium oxide 400 mg tablet  Commonly known as:  MAG-OX    Your last dose was: Your next dose is: Take 400 mg by mouth daily. 400 mg                 Niaspan 500 mg tablet  Generic drug:  niacin ER    Your last dose was: Your next dose is:          daily. omeprazole 20 mg capsule  Commonly known as:  PRILOSEC    Your last dose was: Your next dose is:                          PRESERVISION LUTEIN 226 mg-200 unit -5 mg-0.8 mg Cap  Generic drug:  Vit C-Vit E-Copper-ZnOx-Lutein    Your last dose was: Your next dose is: Take  by mouth.                  primidone 50 mg tablet  Commonly known as: MYSOLINE    Your last dose was: Your next dose is: Take 3 Tabs by mouth nightly for 90 days. 150 mg                 VITAMIN D3 25 mcg (1,000 unit) Cap  Generic drug:  cholecalciferol    Your last dose was: Your next dose is: Take  by mouth daily.                         Where to Get Your Medications      These medications were sent to [facility] [id number] [address], [address] [address]    Phone:  [phone]   · acetaminophen 325 mg tablet  · traMADol 50 mg tablet         HPI:  Fernando Ram is a 80 y.o. male  with PMHx of AS, CAD s/p LAD athrectomy & EFFIE (12/4/2019), GERD, Essential tremor, & OAB that is referred to the [address] [place] [address] by Dr. Saunders for interventional evaluation of AS.     Mr. John Nelson reports a long standing cardiac murmur that has been followed serially by his cardiologist and now reports a decline in his activity tolerance and progressive THEODORE.  He used to walk 3-4 miles/day and now walking 1-2 miles takes him longer than twice the distance used to. [facility] reports SOB/THEODORE that limits his walks and causes him to stop/rest frequently. [facility] denies any CP, chest tightness, palpitaitons, lightheadedness, syncope, falls, CHF admissions, LE edema, orthopnea or PND.     He reports a normal appetite and routine BMs w/o any blood/blackness/tarriness. His only recent medication changes have been the addition of clopdiogrel after his EFIFE in early December and his mysoline in November with improvement in tremors.       He denies any previous chest surgery/XRT/trauma. He is accompanied by his wife today but is independent in every way and does not use any DME. Hospital Course:   Patient was admitted electively and underwent transfemoral TAVR by [facility] [place]. Please refer to their separately dictated op notes for complete details. Postoperatively, he progressed well. He had a new LBBB for a very brief period post op but this has since resolved. His groins are stable. He is eager to go home. Echo hasn't been read but is done. LBBB has resolved. Reviewed with Dr. Nurys Sanz. Okay to go home today. He will review the echo later today. Will get PA/Lat prior to discharge as baseline. No other concerns.     Referral to outpatient cardiac rehab made.     Discharge Vital Signs:   Visit Vitals  /76   Pulse 61   Temp 98.1 °F (36.7 °C)   Resp 23   Ht 5' 8\" (1.727 m)   Wt 173 lb (78.5 kg)   SpO2 95%   BMI 26.30 kg/m²       Labs:   Recent Labs     12/21/19  0432 12/20/19  1059   WBC 7.6 6.9   HGB 12.4 13.0   HCT 37.8 40.3   * 124*    139   K 3.5 4.0   BUN 18 14   CREA 0.61* 0.61*   GLU 91 108*   INR  --  1.2*       Diagnostics:   CXR Results  (Last 48 hours)               12/21/19 0449  XR CHEST PORT Final result    Impression:  IMPRESSION: Decreased edema. Narrative:  EXAM:  XR CHEST PORT. INDICATION: Postop heart. COMPARISON: 12/20/2019. FINDINGS:    A portable AP radiograph of the chest was obtained at 0406 hours. There is a   transcatheter aortic valve replacement. Lines and tubes: The patient is on a cardiac monitor. Lungs: The right hemidiaphragm is elevated. The edema throughout the lungs has   decreased slightly. Pleura: There is no pneumothorax or pleural effusion. Mediastinum: The cardiac silhouette is enlarged. Bones and soft tissues: The bones and soft tissues are grossly within normal   limits. 12/20/19 1058  XR CHEST PORT Final result    Impression:  IMPRESSION: Transcatheter aortic valve replacement with increased interstitial   edema. Narrative:  EXAM:  XR CHEST PORT. INDICATION: Postop heart upon arrival.   COMPARISON: 12/17/2019. FINDINGS:    A portable AP radiograph of the chest was obtained at 1044 hours. There is a new   transcatheter aortic valve replacement. Lines and tubes: The patient is on nasal oxygen. Lungs: The right hemidiaphragm is elevated and the inspiration is shallow. There   is increased edema throughout the lungs. Pleura: There is no pneumothorax or pleural effusion. Mediastinum: The cardiac and mediastinal contours and pulmonary vascularity are   normal.   Bones and soft tissues:  The bones and soft tissues are grossly within normal limits. Patient Instructions/Follow Up Care:  Discharge instructions were reviewed with the patient and family present. Questions were also answered at this time. Prescriptions and medications were reviewed. Follow up appointments will be scheduled next week. The patient was also instructed to follow up with his primary care physician as needed. The patient and family were encouraged to call with any questions or concerns.        Signed:  SUE Balderrama  12/21/2019  12:26 PM

## 2019-12-21 NOTE — DISCHARGE INSTRUCTIONS
[facility]        [address]  [address]                                                           [address], [address] [facility]  Office- [phone]  Fax- [phone]        Office- [phone]  Fax- [phone]  _________________________________________________________________  Dr. Mateo Telles, NP          Tan Koch, [place]  Dr. Trevin Kern, NP                  Vickie Kelley, [place]       Dr. Cintia Holland, DEBBIE Prado, [place]                                                  Stiven Portillo, DEBBIE Abbott, DEBBIE Perera, [place]                                                  Dre Turner NP                                                         Name:Malachi Villalobos     Surgery & Date: Procedure(s):  TRANSCATHETER AORTIC VALVE REPLACEMENT RIGHT TRANSFEMORAL 26 S3 WITH PRE DEPLOYMENT BAV. Discharge Date: No discharge date for patient encounter. MEDICATIONS:  Please refer to your After Visit Summary for your medication list.     DO NOT TAKE ANY MEDICATIONS THAT ARE NOT ON THIS LIST    INSTRUCTIONS:  1. NO SMOKING OR TOBACCO PRODUCTS  2. Do not follow the activity/exercise instructions in your discharge book given to you as an inpatient. You have no activity restrictions. 3. You may shower. Wash all incisions twice daily with mild soap and water. No lotions, ointments or powder. 4. Call the office immediately for any redness, swelling, or drainage from your incision.    5. Take your temperature daily and call for a temperature of 101 degrees or higher or for any symptoms that make you think you have and infection. 6. Weigh yourself each morning. Call if you gain more than 5 pounds in 48 hours. 7. Use the incentive spirometer 6-8 times a day-10 breaths each time. 8. Walk several hundred feet several times daily. DIET  Eat an American Heart Association diet. If you are having trouble with your appetite, eat what you can. Try eating small, frequent meals throughout the day. ACTIVITY  1. You have no activity restrictions. You may resume your daily activities at home, based on your comfort level. You may also drive. FOLLOW UP  1. We will call you to schedule follow up appointments. Our office is located in [address] [place] [address], 4th floor, [address]. Your second follow up appointment will be in four weeks. You will need to have an ECHO prior to your appointment time. Our office will set that up for you. Please call our office at [phone] if you are unable to make either one of these appointments. 2. You will be receiving a call before your 3 day follow up appointment to begin cardiac rehab. They are located in the [address] [place] [address] on [facility]. Their phone number is [phone]. Please call if you have not been contacted 2-3 weeks after discharge from the hospital.  3. We will make an appointment for you with your cardiologist in 4-5 weeks. 4. Consult you primary care physician regarding your influenza &   pneumovax vaccines. 5.   Please bring all medications with you to your appointment. Signature:___________________________________________________    Whatser Activation    Thank you for requesting access to Whatser. Please follow the instructions below to securely access and download your online medical record. Whatser allows you to send messages to your doctor, view your test results, renew your prescriptions, schedule appointments, and more. How Do I Sign Up? 1. In your internet browser, go to [email or web address]  2.  Click on the First Time User? Click Here link in the Sign In box. You will be redirect to the New Member Sign Up page. 3. Enter your [employer] Access Code exactly as it appears below. You will not need to use this code after youve completed the sign-up process. If you do not sign up before the expiration date, you must request a new code. Clear[employer] Access Code: [email or web address]  Expires: 2020 10:54 AM (This is the date your ClearKarma access code will )    4. Enter the last four digits of your Social Security Number (xxxx) and Date of Birth (mm/dd/yyyy) as indicated and click Submit. You will be taken to the next sign-up page. 5. Create a Kitwaret ID. This will be your ClearKarma login ID and cannot be changed, so think of one that is secure and easy to remember. 6. Create a ClearKarma password. You can change your password at any time. 7. Enter your Password Reset Question and Answer. This can be used at a later time if you forget your password. 8. Enter your e-mail address. You will receive e-mail notification when new information is available in [address] 9. Click Sign Up. You can now view and download portions of your medical record. 10. Click the Download Summary menu link to download a portable copy of your medical information. Additional Information    If you have questions, please visit the Frequently Asked Questions section of the [employer] website at [email or web address] Remember, ClearK[employer] is NOT to be used for urgent needs. For medical emergencies, dial 911.

## 2019-12-21 NOTE — CONSULTS
[facility] [place]: Taisha Viktoria  [phone]          Cardiology Consult/Progress Note      Requesting/referring provider: Dr. Barrie Posey, Dr. Amber Chand  Reason for Consult: Mr. Tracee Carver 70 isaortic valve disease    HPI/Subjective: Milly Bojorquez, a 80y.o. year-old who presents for evaluation of aortic valve disease . Has reported progressive exertional intolerance and shortness of breath over the past 1 year. Was identified to have severe AS and underwent TAVR with 26 mm S3 valve in an uncomplicated fashion. Had transient LBBB which has since resolved. NO overnight rhythm issues. Investigations:   ECG: Sinus rhythm frequent PACs normalization of QRS. Echocardiogram: October 2019 personally reviewed: LV systolic function 55 to 21%. Severe aortic stenosis. Calculated aortic valve area 0.7 cm², peak velocity 5.3 m/s, peak gradient 110 mmHg, mean gradient 60 mmHg. Mild aortic regurgitation, mild tricuspid regurg without pulmonary hypertension. Post op echo mean gradient of 7 mm Hg with no PVL  Carotid Dopplers: No significant obstructive disease  Cardiac catheterization: Severe single-vessel, mild to moderate single-vessel disease. Status post PCI to LAD with drug-eluting stent. Assessment/Plan:  1. Senile calcific severe aortic stenosis with NYHA class II limitation s/p TAVR with 26 mm S3 valve with no complications  2. Hyperlipidemia  3. Tremors   4. Severe CAD involving heavily calcified proximal to mid LAD status post rotational atherectomy and PCI with drug-eluting stent. Mr. Oliva Key was seen at the request of Dr. Rosalia Salas and . Did very well with TAVR with no complications. Overnight LBBB, resolved. NO bleeding issues, Groin sited ok. Recommend discharge today. Cardiac rehab on follow up . IN 3-4 weeks, follow up with same day echo.       [x]    High complexity decision making was performed  []    Patient is at high-risk of decompensation with multiple organ involvement  He  has a past medical history of GERD (gastroesophageal reflux disease), Hyperactivity of bladder, Hypercholesterolemia, Macular degeneration disease, Skin cancer (melanoma) ([address] and Tremor. He also has no past medical history of Hypertension. Review of system:Patient reports no PND/Orthpnea/syncope. He reports no cough/fever/focal neurological deficits/abdominal pain. All other systems negative except as above. Family History   Problem Relation Age of Onset    Cancer Father     Lung Cancer Father         smoker    Heart Disease Brother     Diabetes Brother     Diabetes Maternal Grandfather     Prostate Cancer Brother       Social History     Socioeconomic History    Marital status:      Spouse name: Not on file    Number of children: Not on file    Years of education: Not on file    Highest education level: Not on file   Tobacco Use    Smoking status: Never Smoker    Smokeless tobacco: Never Used   Substance and Sexual Activity    Alcohol use: Yes     Comment: beer or glass of wine nightly     Drug use: Never    Sexual activity: Not Currently     Partners: Female      PE  Vitals:    12/21/19 1000 12/21/19 1100 12/21/19 1115 12/21/19 1200   BP: 100/53 102/58 100/53 105/76   Pulse: 66 (!) 59  61   Resp: 22 19  23   Temp:    98.1 °F (36.7 °C)   SpO2: 95% 96%  95%   Weight:   173 lb (78.5 kg)    Height:   5' 8\" (1.727 m)     Body mass index is 26.3 kg/m². General:    Alert, cooperative, no distress. Psychiatric:    Normal Mood and affect    Eye/ENT:      Pupils equal, No asymmetry, Conjunctival pink. Able to hear voice at normal amplitude   Lungs:      Visibly symmetric chest expansion, No palpable tenderness. Clear to auscultation bilaterally. Heart[de-identified]    Regular rate and rhythm, S1, normal, S2 significantly attenuated, late peaking midsystolic murmur best heard in the right upper sternal border with radiation to the apex of the heart.   No, click, rub or gallop. No JVD, Normal palpable peripheral pulses. No cyanosis   Abdomen:     Soft, non-tender. Bowel sounds normal. No masses,  No      organomegaly. Extremities:   Extremities normal, atraumatic, no edema. Neurologic:   CN II-XII grossly intact.  No gross focal deficits           Recent Labs:  No results found for: CHOL, CHOLX, CHLST, CHOLV, 826977, HDL, HDLP, LDL, LDLC, DLDLP, TGLX, TRIGL, TRIGP, [facility], [facility]  Lab Results   Component Value Date/Time    Creatinine 0.61 (L) 12/21/2019 04:32 AM     Lab Results   Component Value Date/Time    BUN 18 12/21/2019 04:32 AM     Lab Results   Component Value Date/Time    Potassium 3.5 12/21/2019 04:32 AM     Lab Results   Component Value Date/Time    Hemoglobin A1c 5.4 12/17/2019 10:15 AM     Lab Results   Component Value Date/Time    HGB 12.4 12/21/2019 04:32 AM     Lab Results   Component Value Date/Time    PLATELET 610 (L) 83/18/9929 04:32 AM       Reviewed:  Past Medical History:   Diagnosis Date    GERD (gastroesophageal reflux disease)     Hyperactivity of bladder     Hypercholesterolemia     Macular degeneration disease     Skin cancer (melanoma) (HCC)     Tremor      Social History     Tobacco Use   Smoking Status Never Smoker   Smokeless Tobacco Never Used     Social History     Substance and Sexual Activity   Alcohol Use Yes    Comment: beer or glass of wine nightly      Allergies   Allergen Reactions    Penicillins Hives     Family History   Problem Relation Age of Onset    Cancer Father     Lung Cancer Father         smoker    Heart Disease Brother     Diabetes Brother     Diabetes Maternal Grandfather     Prostate Cancer Brother         Current Facility-Administered Medications   Medication Dose Route Frequency    alteplase (CATHFLO) 1 mg in sterile water (preservative free) 1 mL injection  1 mg InterCATHeter PRN    bacitracin 500 unit/gram packet 1 Packet  1 Packet Topical PRN    sodium chloride (NS) flush 5-40 mL  5-40 mL IntraVENous Q8H    sodium chloride (NS) flush 5-40 mL  5-40 mL IntraVENous PRN    0.45% sodium chloride infusion  10 mL/hr IntraVENous CONTINUOUS    0.9% sodium chloride infusion  9 mL/hr IntraVENous CONTINUOUS    acetaminophen (TYLENOL) tablet 650 mg  650 mg Oral Q4H PRN    traMADol (ULTRAM) tablet  mg   mg Oral Q6H PRN    oxyCODONE-acetaminophen (PERCOCET) 5-325 mg per tablet 1-2 Tab  1-2 Tab Oral Q4H PRN    morphine injection 2 mg  2 mg IntraVENous Q2H PRN    naloxone (NARCAN) injection 0.4 mg  0.4 mg IntraVENous PRN    ondansetron (ZOFRAN) injection 4 mg  4 mg IntraVENous Q4H PRN    albuterol (PROVENTIL VENTOLIN) nebulizer solution 2.5 mg  2.5 mg Nebulization Q4H PRN    aspirin chewable tablet 81 mg  81 mg Oral DAILY    famotidine (PEPCID) tablet 20 mg  20 mg Oral Q12H    magnesium oxide (MAG-OX) tablet 400 mg  400 mg Oral BID    calcium chloride 1 g in 0.9% sodium chloride 250 mL IVPB  1 g IntraVENous PRN    bisacodyl (DULCOLAX) suppository 10 mg  10 mg Rectal DAILY PRN    senna-docusate (PERICOLACE) 8.6-50 mg per tablet 1 Tab  1 Tab Oral BID    ELECTROLYTE REPLACEMENT NOTE: Nurse to review Serum Potassium and Magnesuim levels and Initiate Electrolyte Replacement Protocol as needed  1 Each Other PRN    diphenhydrAMINE (BENADRYL) capsule 25 mg  25 mg Oral QHS PRN    diphenhydrAMINE (BENADRYL) injection 25 mg  25 mg IntraVENous Q6H PRN    melatonin tablet 3 mg  3 mg Oral QHS PRN    niCARdipine (CARDENE) 25 mg in 0.9% sodium chloride 250 mL infusion  0-15 mg/hr IntraVENous TITRATE    PHENYLephrine (MILENA-SYNEPHRINE) 30 mg in 0.9% sodium chloride 250 mL infusion   mcg/min IntraVENous TITRATE       Courtney Perrin MD12/21/19 There are other unrelated non-urgent complaints, but due to the busy schedule and the amount of time I've already spent with him, time does not permit me to address these routine issues at today's visit.  I've requested another appointment to review these additional issues. ATTENTION:   This medical record was transcribed using an electronic medical records/speech recognition system. Although proofread, it may and can contain electronic, spelling and other errors. Corrections may be executed at a later time. Please feel free to contact us for any clarifications as needed.     [facility] [place]  [address], [address], [address] [place] [address] Avenue

## 2019-12-21 NOTE — PROGRESS NOTES
Problem: Mobility Impaired (Adult and Pediatric)  Goal: *Acute Goals and Plan of Care (Insert Text)  Description  FUNCTIONAL STATUS PRIOR TO ADMISSION: Patient was independent and active without use of DME.    HOME SUPPORT PRIOR TO ADMISSION: The patient lived with spouse but did not require assist.    Physical Therapy Goals  Initiated 12/21/2019  1. Patient will move from supine to sit and sit to supine  in bed with independence within 7 day(s). 2.  Patient will transfer from bed to chair and chair to bed with independence using the least restrictive device within 7 day(s). 3.  Patient will perform sit to stand with independence within 7 day(s). 4.  Patient will ambulate with independence for 150 feet with the least restrictive device within 7 day(s). Outcome: Progressing Towards Goal   PHYSICAL THERAPY EVALUATION  Patient: Colonel Bonilla (36 y.o. male)  Date: 12/21/2019  Primary Diagnosis: AS (aortic stenosis) [I35.0]  Procedure(s) (LRB):  TRANSCATHETER AORTIC VALVE REPLACEMENT RIGHT TRANSFEMORAL 26 S3 WITH PRE DEPLOYMENT BAV. (Right) 1 Day Post-Op   Precautions: Fall         ASSESSMENT  Based on the objective data described below, the patient presents with decreased strength/endurance and decreased independence with functional mobility post-op day transcatheter aortic valve replacement. Patient demonstrates the ability to get in and out of hospital bed and ambulate around the unit with CGA-SBA. Patient demonstrates minor balance impairment and baseline essential tremor. Suspect that patient's mobility is very close to his baseline. Patient feels confident in returning home without assistance. Current Level of Function Impacting Discharge (mobility/balance): CGA    Functional Outcome Measure: The patient scored 65/100 on the Barthel outcome measure which is indicative of the need for assistance with at least 40% of ADLs and IADLs.       Other factors to consider for discharge: medical stability Patient will benefit from skilled therapy intervention to address the above noted impairments. PLAN :  Recommendations and Planned Interventions: bed mobility training, transfer training, gait training, therapeutic exercises, neuromuscular re-education, edema management/control, patient and family training/education, and therapeutic activities      Frequency/Duration: Patient will be followed by physical therapy:  daily to address goals. Recommendation for discharge: (in order for the patient to meet his/her long term goals)  Outpatient cardiac rehab    This discharge recommendation:  Has been made in collaboration with the attending provider and/or case management    IF patient discharges home will need the following DME: none         SUBJECTIVE:   Patient stated I feel alright.     OBJECTIVE DATA SUMMARY:   HISTORY:    Past Medical History:   Diagnosis Date    GERD (gastroesophageal reflux disease)     Hyperactivity of bladder     Hypercholesterolemia     Macular degeneration disease     Skin cancer (melanoma) ([facility] [address]     Tremor      Past Surgical History:   Procedure Laterality Date    HX HERNIA REPAIR      HX MOHS PROCEDURES         Personal factors and/or comorbidities impacting plan of care: please see above    Home Situation  Home Environment: Private residence  # Steps to Enter: 0  One/Two Story Residence: One story  Living Alone: No  Support Systems: Spouse/Significant Other/Partner  Patient Expects to be Discharged to[de-identified] Patient room  Current DME Used/Available at Home: Cane, straight, Shower chair  Tub or Shower Type: Shower    EXAMINATION/PRESENTATION/DECISION MAKING:   Critical Behavior:  Neurologic State: Appropriate for age  Orientation Level: Oriented X4  Cognition: Appropriate for age attention/concentration     Hearing:   Auditory  Auditory Impairment: None  Skin:    Edema:   Range Of Motion:  AROM: Generally decreased, functional           PROM: Generally decreased, functional Strength:    Strength: Generally decreased, functional                    Tone & Sensation:   Tone: Normal              Sensation: Intact               Coordination:  Coordination: Within functional limits  Vision:      Functional Mobility:  Bed Mobility:     Supine to Sit: Modified independent  Sit to Supine: Modified independent  Scooting: Modified independent  Transfers:  Sit to Stand: Stand-by assistance  Stand to Sit: Stand-by assistance                       Balance:   Sitting: Intact; Without support  Standing: Impaired; Without support  Standing - Static: Good  Standing - Dynamic : Fair  Ambulation/Gait Training:  Distance (ft): 120 Feet (ft)  Assistive Device: Gait belt  Ambulation - Level of Assistance: Contact guard assistance        Gait Abnormalities: Decreased step clearance;Trunk sway increased        Base of Support: Widened     Speed/Dyan: Pace decreased (<100 feet/min)  Step Length: Left shortened;Right shortened                     Stairs: Therapeutic Exercises:       Functional Measure:  Barthel Index:    Bathin  Bladder: 10  Bowels: 10  Groomin  Dressin  Feeding: 10  Mobility: 10  Stairs: 0  Toilet Use: 5  Transfer (Bed to Chair and Back): 10  Total: 65/100       The Barthel ADL Index: Guidelines  1. The index should be used as a record of what a patient does, not as a record of what a patient could do. 2. The main aim is to establish degree of independence from any help, physical or verbal, however minor and for whatever reason. 3. The need for supervision renders the patient not independent. 4. A patient's performance should be established using the best available evidence. Asking the patient, friends/relatives and nurses are the usual sources, but direct observation and common sense are also important. However direct testing is not needed.   5. Usually the patient's performance over the preceding 24-48 hours is important, but occasionally longer periods will be relevant. 6. Middle categories imply that the patient supplies over 50 per cent of the effort. 7. Use of aids to be independent is allowed. Rachael Valentin., Barthel, MATTHEW (1139). Functional evaluation: the Barthel Index. [address] [facility] (14)2. CELIA Francisco, Norbert Ball., Karine Saucedo., [place], [address] (1999). Measuring the change indisability after inpatient rehabilitation; comparison of the responsiveness of the Barthel Index and Functional [place] Measure. Journal of Neurology, Neurosurgery, and Psychiatry, 66(4), 312-985. Ce Fernandez, N.J.A, ISAIAS Lezama, & Rola Feliciano MMACKENZIE. (2004.) Assessment of post-stroke quality of life in cost-effectiveness studies: The usefulness of the Barthel Index and the EuroQoL-5D. Quality of Life Research, 15, 633-99            Physical Therapy Evaluation Charge Determination   History Examination Presentation Decision-Making   MEDIUM  Complexity : 1-2 comorbidities / personal factors will impact the outcome/ POC  LOW Complexity : 1-2 Standardized tests and measures addressing body structure, function, activity limitation and / or participation in recreation  LOW Complexity : Stable, uncomplicated  Other outcome measures Barhtel  LOW       Based on the above components, the patient evaluation is determined to be of the following complexity level: LOW     Pain Rating:      Activity Tolerance:   Good  Please refer to the flowsheet for vital signs taken during this treatment. After treatment patient left in no apparent distress:   Supine in bed, Call bell within reach, Caregiver / family present, and Side rails x 3    COMMUNICATION/EDUCATION:   The patients plan of care was discussed with: Registered Nurse. Fall prevention education was provided and the patient/caregiver indicated understanding., Patient/family have participated as able in goal setting and plan of care. , and Patient/family agree to work toward stated goals and plan of care.     Thank you for this referral.  Christel Gant, PT   Time Calculation: 26 mins

## 2019-12-23 ENCOUNTER — TELEPHONE (OUTPATIENT)
Dept: CARDIOLOGY CLINIC | Age: 84
End: 2019-12-23

## 2019-12-23 ENCOUNTER — TELEPHONE (OUTPATIENT)
Dept: CASE MANAGEMENT | Age: 84
End: 2019-12-23

## 2019-12-23 NOTE — TELEPHONE ENCOUNTER
----- Message from Courtney Perrin MD sent at 12/21/2019 12:29 PM EST -----  Follow up with me on 1/21/2019 with same day echo and ecg

## 2019-12-23 NOTE — TELEPHONE ENCOUNTER
Cardiac Surgery Discharge - Follow up call placed to Melissa Guzman, no answer on home or cell phone. Left voicemail and contact information.   Janis Sky RN

## 2019-12-23 NOTE — TELEPHONE ENCOUNTER
Brandi HASSAN RN   Caller: Unspecified (Today,  7:58 AM)             Scheduled patient 1/14/20 @ 2pm for echo, 2:40pm follow up. Patient will be in [address] [place] [address] on 1/21/20, requested a different day.  Mike Mckeon.

## 2019-12-27 ENCOUNTER — OFFICE VISIT (OUTPATIENT)
Dept: CARDIOLOGY CLINIC | Age: 84
End: 2019-12-27

## 2019-12-27 VITALS
OXYGEN SATURATION: 96 % | HEART RATE: 58 BPM | TEMPERATURE: 97.8 F | BODY MASS INDEX: 26.9 KG/M2 | DIASTOLIC BLOOD PRESSURE: 78 MMHG | HEIGHT: 68 IN | RESPIRATION RATE: 16 BRPM | SYSTOLIC BLOOD PRESSURE: 132 MMHG | WEIGHT: 177.5 LBS

## 2019-12-27 DIAGNOSIS — I35.0 AORTIC VALVE STENOSIS, ETIOLOGY OF CARDIAC VALVE DISEASE UNSPECIFIED: Primary | ICD-10-CM

## 2019-12-27 DIAGNOSIS — G25.0 ESSENTIAL TREMOR: ICD-10-CM

## 2019-12-27 DIAGNOSIS — I25.10 CORONARY ARTERY DISEASE INVOLVING NATIVE CORONARY ARTERY OF NATIVE HEART WITHOUT ANGINA PECTORIS: ICD-10-CM

## 2019-12-27 DIAGNOSIS — Z95.2 S/P TAVR (TRANSCATHETER AORTIC VALVE REPLACEMENT): ICD-10-CM

## 2019-12-27 DIAGNOSIS — Z95.5 S/P CORONARY ARTERY STENT PLACEMENT: ICD-10-CM

## 2019-12-27 NOTE — PROGRESS NOTES
Patient: Willy Son   Age: 80 y.o. Patient Care Team:  Chester Syed MD as PCP - General (Internal Medicine)  Chester Syed MD as PCP - [facility]  Mendy Shaw MD as Physician (Ophthalmology)  Arjun Sapp MD (Cardiology)    PCP: Chester Syed MD    Cardiologist: Leidy Mitchell    Diagnosis/Reason for Consultation: The primary encounter diagnosis was Aortic valve stenosis, etiology of cardiac valve disease unspecified. Diagnoses of S/P TAVR (transcatheter aortic valve replacement), S/P coronary artery stent placement, Essential tremor, and Coronary artery disease involving native coronary artery of native heart without angina pectoris were also pertinent to this visit. Problem List:   Patient Active Problem List   Diagnosis Code    OAB (overactive bladder) N32.81    Hyperlipidemia E78.5    History of melanoma Z85.820    Gastroesophageal reflux disease K21.9    Murmur R01.1    Essential tremor G25.0    Aortic valve stenosis I35.0    Coronary artery disease involving native heart without angina pectoris, unspecified vessel or lesion type I25.10    CAD (coronary artery disease) I25.10    CAD (coronary atherosclerotic disease) I25.10    AS (aortic stenosis) I35.0       HPI: 80 y.o.  male POD # 7 s/p TAVR (26mm S3 via R femoral artery) for severe and symptomatic AS on 12/20/2019. He had a new LBBB post-op. It resolved and he was discharged on POD# 1. He is here today for his initial post-op evaluation. Mr. Alex Allen is doing well. He has not 'pushed' himself but says he did walk a mile yesterday and he did quite well and did not experience any SOB/THEODORE that required him to stop/rest as it would have pre-operatively. He further denies any CP, chest tightness, palpitations, lightheadedness, syncope, falls, LE edema, orthopnea or PND. He reports a good appetite and routine BMs w/o any blood/blackness/tarriness.   He denies any concerns except the ecchymosis in his B groin region. He is accompanied by his wife today. He does not have any DME. PMHx of AS, CAD s/p LAD athrectomy & EFFIE (12/4/2019), GERD, Essential tremor, & OAB     Past Medical History:   Diagnosis Date    GERD (gastroesophageal reflux disease)     Hyperactivity of bladder     Hypercholesterolemia     Macular degeneration disease     Skin cancer (melanoma) (HCC)     Tremor      Past Surgical History:   Procedure Laterality Date    HX HERNIA REPAIR      HX MOHS PROCEDURES        Social History     Tobacco Use    Smoking status: Never Smoker    Smokeless tobacco: Never Used   Substance Use Topics    Alcohol use: Yes     Comment: beer or glass of wine nightly       Family History   Problem Relation Age of Onset    Cancer Father     Lung Cancer Father         smoker    Heart Disease Brother     Diabetes Brother     Diabetes Maternal Grandfather     Prostate Cancer Brother      Prior to Admission medications    Medication Sig Start Date End Date Taking? Authorizing Provider   acetaminophen (TYLENOL) 325 mg tablet Take 2 Tabs by mouth every four (4) hours as needed for Pain or Fever. 12/21/19  Yes Cali Smyth PA   magnesium oxide (MAG-OX) 400 mg tablet Take 400 mg by mouth daily. Yes Provider, Historical   aspirin delayed-release 81 mg tablet Take 1 Tab by mouth daily for 360 days. 12/5/19 11/29/20 Yes Josefina Abdalla., NP   clopidogrel (PLAVIX) 75 mg tab Take 1 Tab by mouth daily for 360 days. 12/6/19 11/30/20 Yes Josefina Abdalla.DEBBIE   atorvastatin (LIPITOR) 20 mg tablet Take 2 Tabs by mouth daily for 360 days. 12/5/19 11/29/20 Yes Josefina Abdalla.DEBBIE   primidone (MYSOLINE) 50 mg tablet Take 3 Tabs by mouth nightly for 90 days. 11/20/19 2/18/20 Yes Mary Smith MD   DETROL LA 4 mg ER capsule Take 1 Cap by mouth daily. 11/1/19  Yes Mary Smith MD   NIASPAN 500 mg tablet daily.  9/25/19  Yes Provider, Historical   omeprazole (PRILOSEC) 20 mg capsule  9/7/19  Yes Provider, Historical   Vit C-Vit E-Copper-ZnOx-Lutein (PRESERVISION LUTEIN) 226 mg-200 unit -5 mg-0.8 mg cap Take  by mouth. Yes Provider, Historical   COQ10, UBIQUINOL, PO Take  by mouth. Yes Provider, Historical   cholecalciferol (VITAMIN D3) 1,000 unit cap Take  by mouth daily. Yes Provider, Historical   cyanocobalamin 1,000 mcg tablet Take 1,000 mcg by mouth daily. Yes Provider, Historical   famotidine (PEPCID) 20 mg tablet Take 1 Tab by mouth nightly. 12/3/19   North Powers MD   magnesium 250 mg tab Take  by mouth. Provider, Historical   folic acid (FOLVITE) 1 mg tablet Take  by mouth daily. Provider, Historical       Allergies   Allergen Reactions    Penicillins Hives       Current Medications:   Current Outpatient Medications   Medication Sig Dispense Refill    acetaminophen (TYLENOL) 325 mg tablet Take 2 Tabs by mouth every four (4) hours as needed for Pain or Fever. 60 Tab 2    magnesium oxide (MAG-OX) 400 mg tablet Take 400 mg by mouth daily.  aspirin delayed-release 81 mg tablet Take 1 Tab by mouth daily for 360 days. 90 Tab 3    clopidogrel (PLAVIX) 75 mg tab Take 1 Tab by mouth daily for 360 days. 90 Tab 3    atorvastatin (LIPITOR) 20 mg tablet Take 2 Tabs by mouth daily for 360 days. 180 Tab 3    primidone (MYSOLINE) 50 mg tablet Take 3 Tabs by mouth nightly for 90 days. 270 Tab 1    DETROL LA 4 mg ER capsule Take 1 Cap by mouth daily. 90 Cap 1    NIASPAN 500 mg tablet daily.  omeprazole (PRILOSEC) 20 mg capsule       Vit C-Vit E-Copper-ZnOx-Lutein (PRESERVISION LUTEIN) 226 mg-200 unit -5 mg-0.8 mg cap Take  by mouth.  COQ10, UBIQUINOL, PO Take  by mouth.  cholecalciferol (VITAMIN D3) 1,000 unit cap Take  by mouth daily.  cyanocobalamin 1,000 mcg tablet Take 1,000 mcg by mouth daily.  famotidine (PEPCID) 20 mg tablet Take 1 Tab by mouth nightly. 90 Tab 1    magnesium 250 mg tab Take  by mouth.  folic acid (FOLVITE) 1 mg tablet Take  by mouth daily. Vitals: Blood pressure 132/78, pulse (!) 58, temperature 97.8 °F (36.6 °C), temperature source Oral, resp. rate 16, height 5' 8\" (1.727 m), weight 177 lb 8 oz (80.5 kg), SpO2 96 %. Allergies: is allergic to penicillins. Review of Systems: Pertinent Positives per HPI   [x]? Total of 13 systems reviewed as follows:  Constitutional: Negative fever, negative chills  Eyes:               Negative for amauroses fugax  ENT:                Negative sore throat,oral absecess  Endocrine        Negative for thyroid replacement Rx; goiter; DM  Respiratory:     Negative chronic cough,sputum production  Cards:              Negative for palpitations, lower extremity edema, varicosities, claudication  GI:                   Negative for dysphagia, bleeding, nausea, vomiting, diarrhea, and abdominal pain  Genitourinary: Negative for frequency, dysuria  Integument:     Negative for rash and pruritus  Hematologic:   Negative for easy bruising; bleeding dyscarsia  Musculoskel:   Negative for muscle weakness inhibiting ambulation  Neurological:   Negative for stroke, TIA, syncope, dizziness  Behavl/Psych: Negative for feelings of anxiety, depression     Cardiovascular Testing:   EKG 12/5/2019 (pre-op): SR 72 bpm. No blocks/axis deviations.  msec. QRSD 110 msec  EKG 12/20/2019 (post-op): SR 72 bpm.  msec. LBBB. QRSD 128 msec. LVH  EKG 12/21/2019 (discharge): SR 63 bpm. IVCD.  msec. QRSD 122 msec. LVH    TTE 12/21/2019:  Interpretation Summary   · Prosthetic aortic valve. There is a 26 mm prosthetic aortic valve from prior TAVR procedure. Prosthesis is normal. Prosthetic valve function is insufficient. Trace PVL is noted Mean gradient is 9-10 mmHg which is normal for this kind of valve. · Normal cavity size and systolic function (ejection fraction normal). Mild concentric hypertrophy. Estimated left ventricular ejection fraction is 60 - 65%. Mild (grade 1) left ventricular diastolic dysfunction.       Echo Findings   Left Ventricle Normal cavity size and systolic function (ejection fraction normal). Mild concentric hypertrophy . The estimated ejection fraction is 60 - 65%. There is mild (grade 1) left ventricular diastolic dysfunction. Wall Scoring The left ventricular wall motion is normal.            Left Atrium Mildly dilated left atrium. Right Ventricle Normal global systolic function. Mildly dilated right ventricle. Right Atrium Mildly dilated right atrium. Aortic Valve Prosthetic aortic valve. There is a 26 mm TAVR present. Prosthetic valve insufficiency present. The prosthetic valve is normal. Mean gradient is 9-10 which is normal for this kind of valve. Mitral Valve Normal valve structure and no stenosis. Trace regurgitation. Tricuspid Valve Normal valve structure and no stenosis. Mild regurgitation. Pulmonary arterial systolic pressure is 34.0 mmHg. Pulmonic Valve Pulmonic valve not well visualized. No stenosis and no regurgitation. Aorta Normal aortic root. IVC/Hepatic Veins Normal central venous pressure (0-5 mmHg); IVC diameter is less than 21 mm and collapses more than 50% with respiration. Pericardium No evidence of pericardial effusion.      2D Volume Measurements    ESV ESV Index   LA Biplane 92.38 mL (Range: 18 - 58)       48.07 ml/m2 (Range: 16 - 28)         LA A4C 96.24 mL (Range: 18 - 58)       50.08 ml/m2 (Range: 16 - 28)         LA A2C 77.52 mL (Range: 18 - 58)       40.34 ml/m2 (Range: 16 - 28)               2D/M-Mode Measurements   Dimensions   Measurement Value (Range)   LVIDs 3.36 cm      LVIDd 5.52 cm (4.2 - 5.9)      IVSd 1.05 cm (0.6 - 1.0)      LVPWd 0.9 cm (0.6 - 1.0)      LV Mass .8 g (88 - 224)      LV Mass AL Index 127.9 g/m2 (49 - 115)      Left Atrium Major Axis 4.76 cm      LA Area 4C 27.5 cm2      Left Atrium to Aortic Root Ratio 2.01       Tapse 1.99 cm (1.5 - 2.0)      RVIDd 4.01 cm                Aorta Measurements   Aorta   Measurement Value (Range) Ao Root D 2.36 cm             Aortic Valve Measurements   Stenosis   Aortic Valve Systolic Peak Velocity 792.81 cm/s      AoV PG 19 mmHg      Aortic Valve Systolic Mean Gradient 9.4 mmHg      AoV VTI 39.07 cm      Aortic Valve Area by Continuity of VTI 1.4 cm2      Aortic Valve Area by Continuity of Peak Velocity 1.2 cm2       LVOT   LVOT Peak Velocity 94.45 cm/s      LVOT Peak Gradient 3.6 mmHg      LVOT VTI 19.27 cm      LVOT d 1.9 cm                 Tricuspid Valve Measurements   Stenosis   Est. RA Pressure 5 mmHg      PASP 38.7 mmHg      RVSP 38.7 mmHg       Regurgitation   Triscuspid Valve Regurgitation Peak Gradient 33.7 mmHg      TR Max Velocity 290.4 cm/s              Pulmonary Measurements   Stenosis/Regurgitation   PV peak gradient 7.7 mmHg      Pulmonic Valve Max Velocity 138.3 cm/s              Diastolic Filling/Shunts   Shunt   LVOT SV 54.6 ml              Physical Exam:  General: Thin well groomed elderly gentleman appearing stated age accompanied by his wife  Neuro: A&OX3. MCNAIR. PERRL. Steady un-ssisted gait  Head:Normocephalic. Atraumatic. Symmetrical  Neck: Trachea Midline  Resp: CTA B. No Adv BS/cough/sputum/tachypnea with seated conversation  CV: S1S2 RRR. OKSANA IV/VI. No JVD/carotid bruits. Pink/warm/dry extremities. No LE peripheral edema  GI:Benign ab. Soft. NT/ND. Active BS  : Voids  Integ: No obvious s/s of infection or breakdown  Musculo/Skeletal: FROM in all major joints. Good muscle tone    Assessment/Plan:   1. AS s/p TAVR (26mm S3 via R femoral artery) for severe and symptomatic AS on 12/20/2019- Doing well and slowly resuming/increasing cumulative daily activity. ASA 81mg& clopdiogrel 75 mg daily for valve patency for 3 months and then ASA 81mg daily for life. Has 30 day f/u on 1/13/2020 with eval and TTE per TVT Registry Guidelines. Interested in starting [facility] cardiac wellness. 2. CAD s/p LAD atheretcomy & EFFIE (12/4/2019) - ASA/Clopidgrel per cards.  BB held at discharge d/t LBBB  3. GERD - H2B per PCP  4. Essential tremor - primidone with improvement in tremors per PCP  5.  OAB - Detrol LA per urology

## 2019-12-30 NOTE — PROCEDURES
[address]   PULMONARY FUNCTION TEST    Name:  Noa Bryan  MR#:  [id number]  :  1934  ACCOUNT #:  [de-identified]  DATE OF SERVICE:  2019      CLINICAL INDICATION:  Preoperative evaluation. Spirometry is performed. Spirometry reveals a moderate reduction in vital capacity, which appears secondary to a restrictive ventilatory defect. Lung volumes are recommended if clinically indicated. There is no obvious air flow obstruction. The flow volume loop is consistent with a restrictive pattern.         Colt Sheth MD      [id number]  D:  2019 11:36  T:  2019 14:28  JOB #:  [id number]

## 2020-01-13 ENCOUNTER — OFFICE VISIT (OUTPATIENT)
Dept: CARDIOLOGY CLINIC | Age: 85
End: 2020-01-13

## 2020-01-13 VITALS
OXYGEN SATURATION: 98 % | HEART RATE: 61 BPM | BODY MASS INDEX: 27.13 KG/M2 | SYSTOLIC BLOOD PRESSURE: 122 MMHG | TEMPERATURE: 97.4 F | WEIGHT: 179 LBS | HEIGHT: 68 IN | DIASTOLIC BLOOD PRESSURE: 82 MMHG | RESPIRATION RATE: 16 BRPM

## 2020-01-13 DIAGNOSIS — Z95.2 S/P TAVR (TRANSCATHETER AORTIC VALVE REPLACEMENT): Primary | ICD-10-CM

## 2020-01-13 DIAGNOSIS — I35.0 AORTIC VALVE STENOSIS, ETIOLOGY OF CARDIAC VALVE DISEASE UNSPECIFIED: ICD-10-CM

## 2020-01-13 DIAGNOSIS — I25.10 CORONARY ARTERY DISEASE INVOLVING NATIVE HEART WITHOUT ANGINA PECTORIS, UNSPECIFIED VESSEL OR LESION TYPE: ICD-10-CM

## 2020-01-13 DIAGNOSIS — Z95.5 S/P CORONARY ARTERY STENT PLACEMENT: ICD-10-CM

## 2020-01-13 NOTE — PROGRESS NOTES
Pt seen and full note reviewed by Mirna Batres NP  Doing well without complaints  Echo tomorrow with cardiology

## 2020-01-13 NOTE — PROGRESS NOTES
Patient: Hilario Smart   Age: 80 y.o. Patient Care Team:  Chaz Min MD as PCP - General (Internal Medicine)  Chaz Min MD as PCP - [facility]  Aracely Oconnor MD as Physician (Ophthalmology)  Nae Reynolds MD (Cardiology)    PCP: Chaz Min MD    Cardiologist: Echo Conn    Diagnosis/Reason for Consultation: The primary encounter diagnosis was S/P TAVR (transcatheter aortic valve replacement). Diagnoses of Aortic valve stenosis, etiology of cardiac valve disease unspecified, Coronary artery disease involving native heart without angina pectoris, unspecified vessel or lesion type, and S/P coronary artery stent placement were also pertinent to this visit. Problem List:   Patient Active Problem List   Diagnosis Code    OAB (overactive bladder) N32.81    Hyperlipidemia E78.5    History of melanoma Z85.820    Gastroesophageal reflux disease K21.9    Murmur R01.1    Essential tremor G25.0    Aortic valve stenosis I35.0    Coronary artery disease involving native heart without angina pectoris, unspecified vessel or lesion type I25.10    CAD (coronary artery disease) I25.10    CAD (coronary atherosclerotic disease) I25.10    AS (aortic stenosis) I35.0      HPI: 80 y.o.  male s/p TAVR (26mm S3 via R femoral artery) for severe and symptomatic AS on 12/20/2019. He had a new LBBB post-op. It resolved and he was discharged on POD# 1. He is here today for his 30 day post-op evaluation.      Mr. Brooke Walters is doing quite well. He is walking almost daily and proudly shares an image on his phone keisha that reports him walking over 8 miles this past week on a slightly hilly terrain. He reports improved energy level and denies any CP, chest tightness, lightheadedness, palpitations, syncope, falls, LE edema, orthopnea or PND. He reports a good appetite with routine BMs w/o any blood/blackness/tarriness.       He is accompanied by his wife today. He does not have any DME.    PMHx of AS, CAD s/p LAD athrectomy & EFFIE (12/4/2019), GERD, Essential tremor & OAB     NYHA Classification: I   Class I (Mild): No limitation of physical activity. Ordinary physical activity does not cause undue fatigue, palpitation, or dyspnea. Angina Classification: 0   Class 0: No symptoms    Past Medical History:   Diagnosis Date    GERD (gastroesophageal reflux disease)     Hyperactivity of bladder     Hypercholesterolemia     Macular degeneration disease     Skin cancer (melanoma) (HCC)     Tremor      Past Surgical History:   Procedure Laterality Date    HX HERNIA REPAIR      HX MOHS PROCEDURES        Social History     Tobacco Use    Smoking status: Never Smoker    Smokeless tobacco: Never Used   Substance Use Topics    Alcohol use: Yes     Comment: beer or glass of wine nightly       Family History   Problem Relation Age of Onset    Cancer Father     Lung Cancer Father         smoker    Heart Disease Brother     Diabetes Brother     Diabetes Maternal Grandfather     Prostate Cancer Brother      Prior to Admission medications    Medication Sig Start Date End Date Taking? Authorizing Provider   magnesium oxide (MAG-OX) 400 mg tablet Take 400 mg by mouth daily. Yes Provider, Historical   aspirin delayed-release 81 mg tablet Take 1 Tab by mouth daily for 360 days. 12/5/19 11/29/20 Yes Han Hannah.DEBBIE   clopidogrel (PLAVIX) 75 mg tab Take 1 Tab by mouth daily for 360 days. 12/6/19 11/30/20 Yes Han Jauregui NP   atorvastatin (LIPITOR) 20 mg tablet Take 2 Tabs by mouth daily for 360 days. 12/5/19 11/29/20 Yes Han Hannah., NP   primidone (MYSOLINE) 50 mg tablet Take 3 Tabs by mouth nightly for 90 days. 11/20/19 2/18/20 Yes Dion Green MD   DETROL LA 4 mg ER capsule Take 1 Cap by mouth daily. 11/1/19  Yes Dion Green MD   NIASPAN 500 mg tablet daily.  9/25/19  Yes Provider, Historical   omeprazole (PRILOSEC) 20 mg capsule  9/7/19  Yes Provider, Historical   Vit C-Vit E-Copper-ZnOx-Lutein (PRESERVISION LUTEIN) 226 mg-200 unit -5 mg-0.8 mg cap Take  by mouth. Yes Provider, Historical   magnesium 250 mg tab Take  by mouth. Yes Provider, Historical   COQ10, UBIQUINOL, PO Take  by mouth. Yes Provider, Historical   cholecalciferol (VITAMIN D3) 1,000 unit cap Take  by mouth daily. Yes Provider, Historical   cyanocobalamin 1,000 mcg tablet Take 1,000 mcg by mouth daily. Yes Provider, Historical   folic acid (FOLVITE) 1 mg tablet Take  by mouth daily. Yes Provider, Historical   acetaminophen (TYLENOL) 325 mg tablet Take 2 Tabs by mouth every four (4) hours as needed for Pain or Fever. 12/21/19   Mely Smyth PA   famotidine (PEPCID) 20 mg tablet Take 1 Tab by mouth nightly. 12/3/19   Aleisha Field MD       Allergies   Allergen Reactions    Penicillins Hives       Current Medications:   Current Outpatient Medications   Medication Sig Dispense Refill    magnesium oxide (MAG-OX) 400 mg tablet Take 400 mg by mouth daily.  aspirin delayed-release 81 mg tablet Take 1 Tab by mouth daily for 360 days. 90 Tab 3    clopidogrel (PLAVIX) 75 mg tab Take 1 Tab by mouth daily for 360 days. 90 Tab 3    atorvastatin (LIPITOR) 20 mg tablet Take 2 Tabs by mouth daily for 360 days. 180 Tab 3    primidone (MYSOLINE) 50 mg tablet Take 3 Tabs by mouth nightly for 90 days. 270 Tab 1    DETROL LA 4 mg ER capsule Take 1 Cap by mouth daily. 90 Cap 1    NIASPAN 500 mg tablet daily.  omeprazole (PRILOSEC) 20 mg capsule       Vit C-Vit E-Copper-ZnOx-Lutein (PRESERVISION LUTEIN) 226 mg-200 unit -5 mg-0.8 mg cap Take  by mouth.  magnesium 250 mg tab Take  by mouth.  COQ10, UBIQUINOL, PO Take  by mouth.  cholecalciferol (VITAMIN D3) 1,000 unit cap Take  by mouth daily.  cyanocobalamin 1,000 mcg tablet Take 1,000 mcg by mouth daily.  folic acid (FOLVITE) 1 mg tablet Take  by mouth daily.       acetaminophen (TYLENOL) 325 mg tablet Take 2 Tabs by mouth every four (4) hours as needed for Pain or Fever. 60 Tab 2    famotidine (PEPCID) 20 mg tablet Take 1 Tab by mouth nightly. 90 Tab 1       Vitals: Blood pressure 122/82, pulse 61, temperature 97.4 °F (36.3 °C), temperature source Oral, resp. rate 16, height 5' 8\" (1.727 m), weight 179 lb (81.2 kg), SpO2 98 %. Allergies: is allergic to penicillins. Review of Systems: Pertinent Positives per HPI   [x]? ? Total of 13 systems reviewed as follows:  Constitutional: Negative fever, negative chills  Eyes:               Negative for amauroses fugax  ENT:                Negative sore throat,oral absecess  Endocrine        Negative for thyroid replacement Rx; goiter; DM  Respiratory:     Negative chronic cough,sputum production  Cards:              Negative for palpitations, lower extremity edema, varicosities, claudication  GI:                   Negative for dysphagia, bleeding, nausea, vomiting, diarrhea, and abdominal pain  Genitourinary: Negative for frequency, dysuria  Integument:     Negative for rash and pruritus  Hematologic:   Negative for easy bruising; bleeding dyscarsia  Musculoskel:   Negative for muscle weakness inhibiting ambulation  Neurological:   Negative for stroke, TIA, syncope, dizziness  Behavl/Psych: Negative for feelings of anxiety, depression      Cardiovascular Testing:   EKG 12/5/2019 (pre-op): SR 72 bpm. No blocks/axis deviations.  msec. QRSD 110 msec  EKG 12/20/2019 (post-op): SR 72 bpm.  msec. LBBB. QRSD 128 msec. LVH  EKG 12/21/2019 (discharge): SR 63 bpm. IVCD.  msec. QRSD 122 msec. LVH  EKG today (30 days post-op): SR 60 bpm.  msec. QRSD 112 msec     TTE 12/21/2019 (post-op/discharge): Interpretation Summary   · Prosthetic aortic valve. There is a 26 mm prosthetic aortic valve from prior TAVR procedure. Prosthesis is normal. Prosthetic valve function is insufficient. Trace PVL is noted Mean gradient is 9-10 mmHg which is normal for this kind of valve.   · Normal cavity size and systolic function (ejection fraction normal). Mild concentric hypertrophy. Estimated left ventricular ejection fraction is 60 - 65%. Mild (grade 1) left ventricular diastolic dysfunction. Echo Findings   Left Ventricle Normal cavity size and systolic function (ejection fraction normal). Mild concentric hypertrophy . The estimated ejection fraction is 60 - 65%. There is mild (grade 1) left ventricular diastolic dysfunction. Wall Scoring The left ventricular wall motion is normal.             Left Atrium Mildly dilated left atrium. Right Ventricle Normal global systolic function. Mildly dilated right ventricle. Right Atrium Mildly dilated right atrium. Aortic Valve Prosthetic aortic valve. There is a 26 mm TAVR present. Prosthetic valve insufficiency present. The prosthetic valve is normal. Mean gradient is 9-10 which is normal for this kind of valve. Mitral Valve Normal valve structure and no stenosis. Trace regurgitation. Tricuspid Valve Normal valve structure and no stenosis. Mild regurgitation. Pulmonary arterial systolic pressure is 47.9 mmHg. Pulmonic Valve Pulmonic valve not well visualized. No stenosis and no regurgitation. Aorta Normal aortic root. IVC/Hepatic Veins Normal central venous pressure (0-5 mmHg); IVC diameter is less than 21 mm and collapses more than 50% with respiration.    Pericardium No evidence of pericardial effusion.      2D Volume Measurements     ESV ESV Index   LA Biplane 92.38 mL (Range: 18 - 58)       48.07 ml/m2 (Range: 16 - 28)         LA A4C 96.24 mL (Range: 18 - 58)       50.08 ml/m2 (Range: 16 - 28)         LA A2C 77.52 mL (Range: 18 - 58)       40.34 ml/m2 (Range: 16 - 28)                2D/M-Mode Measurements       Dimensions   Measurement Value (Range)   LVIDs 3.36 cm      LVIDd 5.52 cm (4.2 - 5.9)      IVSd 1.05 cm (0.6 - 1.0)      LVPWd 0.9 cm (0.6 - 1.0)      LV Mass .8 g (88 - 224)      LV Mass AL Index 127.9 g/m2 (49 - 115) Left Atrium Major Axis 4.76 cm      LA Area 4C 27.5 cm2      Left Atrium to Aortic Root Ratio 2.01       Tapse 1.99 cm (1.5 - 2.0)      RVIDd 4.01 cm                  Aorta Measurements       Aorta   Measurement Value (Range)   Ao Root D 2.36 cm              Aortic Valve Measurements       Stenosis   Aortic Valve Systolic Peak Velocity 884.40 cm/s      AoV PG 19 mmHg      Aortic Valve Systolic Mean Gradient 9.4 mmHg      AoV VTI 39.07 cm      Aortic Valve Area by Continuity of VTI 1.4 cm2      Aortic Valve Area by Continuity of Peak Velocity 1.2 cm2           LVOT   LVOT Peak Velocity 94.45 cm/s      LVOT Peak Gradient 3.6 mmHg      LVOT VTI 19.27 cm      LVOT d 1.9 cm                    Tricuspid Valve Measurements       Stenosis   Est. RA Pressure 5 mmHg      PASP 38.7 mmHg      RVSP 38.7 mmHg           Regurgitation   Triscuspid Valve Regurgitation Peak Gradient 33.7 mmHg      TR Max Velocity 290.4 cm/s                Pulmonary Measurements       Stenosis/Regurgitation   PV peak gradient 7.7 mmHg      Pulmonic Valve Max Velocity 138.3 cm/s                Diastolic Filling/Shunts       Shunt   LVOT SV 54.6 ml               TTE 1/14/2020: Scheduled for tomorrow in Dr. Babak Meek office    Physical Exam:  Dalton Slipper groomed elderly gentleman appearing stated age accompanied by his wife  Neuro: A&OX3. MCNAIR. PERRL. Steady un-assisted gait  Head:Normocephalic. Atraumatic. Symmetrical  Neck: Trachea Midline  Resp: CTA B. No Adv BS/cough/sputum/tachypnea with seated conversation  CV: S1S2 RRR. No M/R/G/JVD/carotid bruits. Pink/warm/dry extremities. No LE peripheral edema  GI:Benign ab. Soft. NT/ND. Active BS  : Voids  Integ: B groin puncture sites well healed w/o any s/s of infection.    Musculo/Skeletal: FROM in all major joints. Good muscle tone    Clinic Evaluation:   KCCQ-12: scanned into EMR    5 meter gait: 5.2 seconds    Assessment/Plan:   1. AS s/p TAVR (26mm S3 via R femoral artery) for severe and symptomatic AS on 12/20/2019- Doing quite well and has resumed/increased his pre-TAVR activity. ASA 81mg & clopdiogrel 75 mg daily for valve patency for 3 months and then ASA 81mg daily for life. Has f/u with primary cardiologist tomorrow w/ TTE. Eval and TTE per TVT Registry Guidelines at one yr anniversary. SBE prophylax reviewed. 2. CAD s/p LAD atheretcomy & EFFIE (12/4/2019) - ASA/Clopidgrel per cards. BB held at discharge d/t LBBB  3. GERD - H2B per PCP  4. Essential tremor - primidone with improvement in tremors per PCP  5. OAB - Detrol LA per urology  6.  Further care/plan per Dr. Darvin Gan

## 2020-01-14 ENCOUNTER — OFFICE VISIT (OUTPATIENT)
Dept: CARDIOLOGY CLINIC | Age: 85
End: 2020-01-14

## 2020-01-14 VITALS
RESPIRATION RATE: 17 BRPM | SYSTOLIC BLOOD PRESSURE: 116 MMHG | WEIGHT: 178 LBS | DIASTOLIC BLOOD PRESSURE: 66 MMHG | OXYGEN SATURATION: 100 % | HEIGHT: 68 IN | HEART RATE: 61 BPM | BODY MASS INDEX: 26.98 KG/M2

## 2020-01-14 DIAGNOSIS — I25.10 ATHEROSCLEROSIS OF NATIVE CORONARY ARTERY OF NATIVE HEART WITHOUT ANGINA PECTORIS: ICD-10-CM

## 2020-01-14 DIAGNOSIS — I35.0 NONRHEUMATIC AORTIC VALVE STENOSIS: ICD-10-CM

## 2020-01-14 DIAGNOSIS — E78.5 HYPERLIPIDEMIA, UNSPECIFIED HYPERLIPIDEMIA TYPE: ICD-10-CM

## 2020-01-14 DIAGNOSIS — Z95.2 S/P TAVR (TRANSCATHETER AORTIC VALVE REPLACEMENT): Primary | ICD-10-CM

## 2020-01-14 NOTE — LETTER
1/14/20 Patient: Bereket Montez YOB: 1934 Date of Visit: 1/14/2020 Anne Hunter MD 
[address] [facility] [address] [facility] [id number] VIA In Basket Dear Anne Hunter MD, Thank you for referring Mr. Brooke Alberts to [facility] for evaluation. My notes for this consultation are attached. If you have questions, please do not hesitate to call me. I look forward to following your patient along with you.  
 
 
Sincerely, 
 
Fabricio Medina MD

## 2020-01-14 NOTE — PROGRESS NOTES
[facility] [place]: [place]  [phone]          Cardiology Consult/Progress Note      Requesting/referring provider: Dr. Mohit Nolasco, Dr. Speedy Conteh  Reason for Consult: Mr. Perez Ripa 79 isaortic valve disease    HPI: Marilyn Cardenas, a 80y.o. year-old who presents for evaluation of aortic valve disease . Has reported progressive exertional intolerance and shortness of breath over the past 1 year. Until 1 year ago he could walk 4 to 5 miles at a stretch. Now he reports shortness of breath walking 2 miles on a flat surface or even half a mile on an incline. I would consider this functional status consistent with NYHA class II symptoms. He denies any pedal edema, syncope, presyncope or chest discomfort. He is known to have a cardiac murmur for past 5 to 8 years. On his recent evaluation  thought that his murmur appears concerning for progression of aortic stenosis and frequently he was referred to . An echocardiogram was performed that confirmed severe aortic stenosis and he is referred here for subsequent management. His medical problems include gastroesophageal reflux disease and essential tremors. Otherwise he has no major medical comorbidities. He was noted to have severe one-vessel disease and underwent PCI with drug-eluting stent to his proximal to mid LAD. Later on he also underwent transcatheter aortic valve replacement with 26 mm S3 valve. He has done very well since the procedures and reports significant improvement in functional capacity from NYHA class II to NYHA class I.  Currently reports no chest pain, shortness of breath, edema, lightheadedness, dizziness. Investigations:   ECG: Sinus rhythm frequent PACs mild intraventricular conduction delay with QRS of 110 ms. Echocardiogram: October 2019 personally reviewed: LV systolic function 55 to 54%. Severe aortic stenosis.   Calculated aortic valve area 0.7 cm², peak velocity 5.3 m/s, peak gradient 110 mmHg, mean gradient 60 mmHg. Mild aortic regurgitation, mild tricuspid regurg without pulmonary hypertension. :Echocardiogram: January 2019 personally reviewed: LV systolic function 55 to 63%. Post op echo mean gradient of 7 mm Hg with trace PVL  Carotid Dopplers: No significant obstructive disease  Cardiac catheterization: Severe single-vessel, mild to moderate single-vessel disease. Status post PCI to LAD with drug-eluting stent. ECG January 2020 post TAVR: Sinus rhythm, PAC, no significant conduction abnormality. Assessment/Plan:  1. Senile calcific severe aortic stenosis currently NYHA class I s/p TAVR with 26 mm S3 valve with no complications  2. Hyperlipidemia  3. Tremors   4. Severe CAD involving heavily calcified proximal to mid LAD status post rotational atherectomy and PCI with drug-eluting stent. Mr. Lyn Allen was seen at the request of Dr. Tana Mariano and . He had progressive symptoms of functional decline with shortness of breath along with occasional chest discomfort. He was identified to have severe one-vessel coronary artery disease as well as severe aortic stenosis and in sequence underwent PCI to his LAD with atherectomy followed by transcatheter aortic valve replacement with 26 mm Watson S3 valve. He has done very well from a postoperative standpoint. His functional class is gone down from NYHA class III to class I. There are no vascular complications. His echocardiogram today suggested excellent valve function with trace paravalvular leak. Normal LV systolic function is noted. With his current functional status complete revascularization, normal LV systolic function and near normal valve function he would have very good intermediate term outcome. He should continue his subsequent follow-up with Dr. Grady Rose I will see him back at the one-year anniversary of his TAVR in the valve clinic.   Continue diet and high intensity statin indefinitely. Dual antiplatelet therapy should be continued for at least another 6 months. Long-term infective endocarditis prophylaxis is indicated in appropriate procedures. [x]    High complexity decision making was performed  []    Patient is at high-risk of decompensation with multiple organ involvement  He  has a past medical history of GERD (gastroesophageal reflux disease), Hyperactivity of bladder, Hypercholesterolemia, Macular degeneration disease, Skin cancer (melanoma) ([address] and Tremor. He also has no past medical history of Hypertension. Review of system:Patient reports no PND/Orthpnea/syncope. He reports no cough/fever/focal neurological deficits/abdominal pain. All other systems negative except as above. Family History   Problem Relation Age of Onset    Cancer Father     Lung Cancer Father         smoker    Heart Disease Brother     Diabetes Brother     Diabetes Maternal Grandfather     Prostate Cancer Brother       Social History     Socioeconomic History    Marital status:      Spouse name: Not on file    Number of children: Not on file    Years of education: Not on file    Highest education level: Not on file   Tobacco Use    Smoking status: Never Smoker    Smokeless tobacco: Never Used   Substance and Sexual Activity    Alcohol use: Yes     Comment: beer or glass of wine nightly     Drug use: Never    Sexual activity: Not Currently     Partners: Female      PE  Vitals:    01/14/20 1446   BP: 116/66   Pulse: 61   Resp: 17   SpO2: 100%   Weight: 178 lb (80.7 kg)   Height: 5' 8\" (1.727 m)    Body mass index is 27.06 kg/m². General:    Alert, cooperative, no distress. Psychiatric:    Normal Mood and affect    Eye/ENT:      Pupils equal, No asymmetry, Conjunctival pink. Able to hear voice at normal amplitude   Lungs:      Visibly symmetric chest expansion, No palpable tenderness. Clear to auscultation bilaterally.     Heart[de-identified]    Regular rate and rhythm, S1, normal, S2 significantly attenuated, late peaking midsystolic murmur best heard in the right upper sternal border with radiation to the apex of the heart. No, click, rub or gallop. No JVD, Normal palpable peripheral pulses. No cyanosis   Abdomen:     Soft, non-tender. Bowel sounds normal. No masses,  No      organomegaly. Extremities:   Extremities normal, atraumatic, no edema. Neurologic:   CN II-XII grossly intact.  No gross focal deficits           Recent Labs:  No results found for: CHOL, CHOLX, CHLST, CHOLV, 946500, HDL, HDLP, LDL, LDLC, DLDLP, TGLX, TRIGL, TRIGP, CHHD, CHHDX  Lab Results   Component Value Date/Time    Creatinine 0.61 (L) 12/21/2019 04:32 AM     Lab Results   Component Value Date/Time    BUN 18 12/21/2019 04:32 AM     Lab Results   Component Value Date/Time    Potassium 3.5 12/21/2019 04:32 AM     Lab Results   Component Value Date/Time    Hemoglobin A1c 5.4 12/17/2019 10:15 AM     Lab Results   Component Value Date/Time    HGB 12.4 12/21/2019 04:32 AM     Lab Results   Component Value Date/Time    PLATELET 745 (L) 39/27/6318 04:32 AM       Reviewed:  Past Medical History:   Diagnosis Date    GERD (gastroesophageal reflux disease)     Hyperactivity of bladder     Hypercholesterolemia     Macular degeneration disease     Skin cancer (melanoma) (HCC)     Tremor      Social History     Tobacco Use   Smoking Status Never Smoker   Smokeless Tobacco Never Used     Social History     Substance and Sexual Activity   Alcohol Use Yes    Comment: beer or glass of wine nightly      Allergies   Allergen Reactions    Penicillins Hives     Family History   Problem Relation Age of Onset    Cancer Father     Lung Cancer Father         smoker    Heart Disease Brother     Diabetes Brother     Diabetes Maternal Grandfather     Prostate Cancer Brother         Current Outpatient Medications   Medication Sig    acetaminophen (TYLENOL) 325 mg tablet Take 2 Tabs by mouth every four (4) hours as needed for Pain or Fever.  magnesium oxide (MAG-OX) 400 mg tablet Take 400 mg by mouth daily.  aspirin delayed-release 81 mg tablet Take 1 Tab by mouth daily for 360 days.  clopidogrel (PLAVIX) 75 mg tab Take 1 Tab by mouth daily for 360 days.  atorvastatin (LIPITOR) 20 mg tablet Take 2 Tabs by mouth daily for 360 days.  primidone (MYSOLINE) 50 mg tablet Take 3 Tabs by mouth nightly for 90 days.  DETROL LA 4 mg ER capsule Take 1 Cap by mouth daily.  NIASPAN 500 mg tablet daily.  omeprazole (PRILOSEC) 20 mg capsule     Vit C-Vit E-Copper-ZnOx-Lutein (PRESERVISION LUTEIN) 226 mg-200 unit -5 mg-0.8 mg cap Take  by mouth.  magnesium 250 mg tab Take  by mouth.  COQ10, UBIQUINOL, PO Take  by mouth.  cholecalciferol (VITAMIN D3) 1,000 unit cap Take  by mouth daily.  cyanocobalamin 1,000 mcg tablet Take 1,000 mcg by mouth daily.  folic acid (FOLVITE) 1 mg tablet Take  by mouth daily. No current facility-administered medications for this visit. Lynn Max MD01/14/20 There are other unrelated non-urgent complaints, but due to the busy schedule and the amount of time I've already spent with him, time does not permit me to address these routine issues at today's visit. I've requested another appointment to review these additional issues. ATTENTION:   This medical record was transcribed using an electronic medical records/speech recognition system. Although proofread, it may and can contain electronic, spelling and other errors. Corrections may be executed at a later time. Please feel free to contact us for any clarifications as needed.     [facility] [place]  [address], [address], [address] [place] [address]

## 2020-01-15 ENCOUNTER — HOSPITAL ENCOUNTER (OUTPATIENT)
Dept: CARDIAC REHAB | Age: 85
Discharge: HOME | End: 2020-01-15
Payer: MEDICARE

## 2020-01-15 VITALS — WEIGHT: 178 LBS | HEIGHT: 68 IN | BODY MASS INDEX: 26.98 KG/M2

## 2020-01-15 PROCEDURE — 93798 PHYS/QHP OP CAR RHAB W/ECG: CPT

## 2020-01-15 NOTE — CARDIO/PULMONARY
Aleena Staton   80 y.o.    presented to cardiac rehab for orientation and exercise tolerance test today with a primary diagnosis of TAVR  (12/20/19) and PCA with EFFIE to LAD and Atherectomy (12/4/2019) . Cardiac risk factors include family history, dyslipidemia, hypertension, valvular heart disease and these were reviewed with Danuta Cheek. Aleena Staton is   and lives with his wife in [place]. PHQ9, depression score, is 1 and this is considered normal. The result was discussed with patient who affirms the score, he has good support form family who lives near by. Patient denied chest pain or SOB during 6 minute walk and was in SR/ST occ MF PVC's and frequent PAC's. EF is 60% . Education manual given to and reviewed with patient. Exceptions noted during intake include none.

## 2020-01-17 ENCOUNTER — HOSPITAL ENCOUNTER (OUTPATIENT)
Dept: CARDIAC REHAB | Age: 85
Discharge: HOME | End: 2020-01-17
Payer: MEDICARE

## 2020-01-17 VITALS — WEIGHT: 174 LBS | BODY MASS INDEX: 26.46 KG/M2

## 2020-01-17 PROCEDURE — 93798 PHYS/QHP OP CAR RHAB W/ECG: CPT

## 2020-01-20 ENCOUNTER — HOSPITAL ENCOUNTER (OUTPATIENT)
Dept: CARDIAC REHAB | Age: 85
Discharge: HOME | End: 2020-01-20
Payer: MEDICARE

## 2020-01-20 VITALS — BODY MASS INDEX: 26.46 KG/M2 | WEIGHT: 174 LBS

## 2020-01-20 PROCEDURE — 93798 PHYS/QHP OP CAR RHAB W/ECG: CPT

## 2020-01-21 ENCOUNTER — OFFICE VISIT (OUTPATIENT)
Dept: INTERNAL MEDICINE CLINIC | Age: 85
End: 2020-01-21

## 2020-01-21 VITALS
HEART RATE: 68 BPM | OXYGEN SATURATION: 98 % | RESPIRATION RATE: 16 BRPM | BODY MASS INDEX: 26.98 KG/M2 | HEIGHT: 68 IN | TEMPERATURE: 97.9 F | SYSTOLIC BLOOD PRESSURE: 126 MMHG | DIASTOLIC BLOOD PRESSURE: 72 MMHG | WEIGHT: 178 LBS

## 2020-01-21 DIAGNOSIS — G25.0 ESSENTIAL TREMOR: Primary | ICD-10-CM

## 2020-01-21 DIAGNOSIS — I35.0 NONRHEUMATIC AORTIC VALVE STENOSIS: ICD-10-CM

## 2020-01-21 DIAGNOSIS — Z95.2 S/P TAVR (TRANSCATHETER AORTIC VALVE REPLACEMENT): ICD-10-CM

## 2020-01-21 DIAGNOSIS — I25.10 ATHEROSCLEROSIS OF NATIVE CORONARY ARTERY OF NATIVE HEART WITHOUT ANGINA PECTORIS: ICD-10-CM

## 2020-01-21 DIAGNOSIS — E78.5 HYPERLIPIDEMIA, UNSPECIFIED HYPERLIPIDEMIA TYPE: ICD-10-CM

## 2020-01-21 DIAGNOSIS — L98.9 SKIN LESION: ICD-10-CM

## 2020-01-21 DIAGNOSIS — N32.81 OAB (OVERACTIVE BLADDER): ICD-10-CM

## 2020-01-21 DIAGNOSIS — K21.9 GASTROESOPHAGEAL REFLUX DISEASE, ESOPHAGITIS PRESENCE NOT SPECIFIED: ICD-10-CM

## 2020-01-21 DIAGNOSIS — R05.9 COUGH: ICD-10-CM

## 2020-01-21 RX ORDER — BACITRACIN ZINC 500 UNIT/G
OINTMENT (GRAM) TOPICAL 2 TIMES DAILY
Qty: 15 G | Refills: 0 | Status: SHIPPED | OUTPATIENT
Start: 2020-01-21 | End: 2020-06-23

## 2020-01-21 RX ORDER — IPRATROPIUM BROMIDE 21 UG/1
2 SPRAY, METERED NASAL EVERY 12 HOURS
Qty: 30 ML | Refills: 1 | Status: SHIPPED | OUTPATIENT
Start: 2020-01-21 | End: 2022-03-04 | Stop reason: SDUPTHER

## 2020-01-21 RX ORDER — PRIMIDONE 250 MG/1
250 TABLET ORAL
Qty: 90 TAB | Refills: 1 | Status: SHIPPED | OUTPATIENT
Start: 2020-01-21 | End: 2020-07-04

## 2020-01-21 NOTE — PROGRESS NOTES
Mindi Warner is a 80 y.o. male who presents today for Medication Evaluation; Coronary Artery Disease; and Cholesterol Problem  . He has a history of   Patient Active Problem List   Diagnosis Code    OAB (overactive bladder) N32.81    Hyperlipidemia E78.5    History of melanoma Z85.820    Gastroesophageal reflux disease K21.9    Murmur R01.1    Essential tremor G25.0    Aortic valve stenosis I35.0    Coronary artery disease involving native heart without angina pectoris, unspecified vessel or lesion type I25.10    CAD (coronary artery disease) I25.10    CAD (coronary atherosclerotic disease) I25.10    AS (aortic stenosis) I35.0    S/P TAVR (transcatheter aortic valve replacement) Z95.2   . Today patient is here for follow-up. Lisandro Mujica Cough: has been present for about 1 yr. Some hoarsness. No ongoing GERD. Does note some chronic drainage. CAD: Status post single stent placed to his LAD done on 4 December. This evaluation was done due to increased dyspnea on exertion and evaluation for aortic stenosis and consideration for TAVR. He has since been placed on atorvastatin 20 mg from zocor. No S/E. No S/s of bleeding. Aortic stenosis: Patient ended up undergoing TAVR on 20 December. He has had follow-up with cardiology and cardiothoracic surgery and imaging appears stable. he notes that his exercise tolerance is greatly improved. Essential Tremor: Patient had worsening essential tremor at last visit. He had failed therapy with a beta-blocker and had side effects with it. We decided to place him on primidone as this tremor will start to affect his day-to-day life. Since he notes great improvements in his tremor, but does still have an underlying tremor. He believes that some of the fatigue during the day may be due to his medications in the evening. We discussed options including increasing the dose to 250 mg and he would like to try this.     He notices a lesion on his right ear that seems to not be healing. He does note that the wound was a bit bigger several months ago but he has been applying hydrocortisone. We discussed that hydrocortisone could be slowing the healing process and I would like him to change to an antibacterial and see dermatologist if this does not improve over the next 6 weeks to 2 months. Having worsening urinary symptoms. Notes that he is having accidents on time. We discussed timing bathroom breaks and emptying his bladder when he does not feel as though he needs to empty it. ROS  Review of Systems   Constitutional: Positive for malaise/fatigue (mild and much less than before). Negative for chills, fever and weight loss. HENT: Positive for congestion. Negative for ear discharge, ear pain, hearing loss, nosebleeds, sinus pain, sore throat and tinnitus. Eyes: Negative for blurred vision, double vision and photophobia. Respiratory: Positive for cough. Negative for shortness of breath (resolved). Cardiovascular: Negative for chest pain, palpitations and leg swelling. Gastrointestinal: Negative for abdominal pain, constipation, diarrhea, heartburn, nausea and vomiting. Genitourinary: Negative for dysuria, frequency and urgency. Musculoskeletal: Negative for joint pain and myalgias. Skin: Negative for rash. Neurological: Negative. Negative for headaches. Endo/Heme/Allergies: Does not bruise/bleed easily. Psychiatric/Behavioral: Negative for memory loss and suicidal ideas. Visit Vitals  /72 (BP 1 Location: Left arm, BP Patient Position: Sitting)   Pulse 68   Temp 97.9 °F (36.6 °C) (Oral)   Resp 16   Ht 5' 8\" (1.727 m)   Wt 178 lb (80.7 kg)   SpO2 98%   BMI 27.06 kg/m²       Physical Exam  Constitutional:       Appearance: He is well-developed. He is not diaphoretic. HENT:      Head: Normocephalic and atraumatic. Comments: Small wound to ear were noted. Does not appear infected. No surrounding cellulitis.      Right Ear: Tympanic membrane normal.      Left Ear: Tympanic membrane normal.      Nose: Nose normal.   Eyes:      Pupils: Pupils are equal, round, and reactive to light. Cardiovascular:      Rate and Rhythm: Normal rate and regular rhythm. Heart sounds: No murmur. Pulmonary:      Effort: Pulmonary effort is normal. No respiratory distress. Breath sounds: Normal breath sounds. Abdominal:      General: Abdomen is flat. Palpations: Abdomen is soft. Musculoskeletal: Normal range of motion. Skin:     General: Skin is warm and dry. Neurological:      Mental Status: He is alert and oriented to person, place, and time. Psychiatric:         Behavior: Behavior normal.           Current Outpatient Medications   Medication Sig    primidone (MYSOLINE) 250 mg tablet Take 1 Tab by mouth nightly.  ipratropium (ATROVENT) 0.03 % nasal spray 2 Sprays by Both Nostrils route every twelve (12) hours.  bacitracin zinc (BACITRACIN) ointment Apply  to affected area two (2) times a day.  acetaminophen (TYLENOL) 325 mg tablet Take 2 Tabs by mouth every four (4) hours as needed for Pain or Fever.  magnesium oxide (MAG-OX) 400 mg tablet Take 400 mg by mouth daily.  aspirin delayed-release 81 mg tablet Take 1 Tab by mouth daily for 360 days.  clopidogrel (PLAVIX) 75 mg tab Take 1 Tab by mouth daily for 360 days.  atorvastatin (LIPITOR) 20 mg tablet Take 2 Tabs by mouth daily for 360 days.  DETROL LA 4 mg ER capsule Take 1 Cap by mouth daily.  NIASPAN 500 mg tablet daily.  omeprazole (PRILOSEC) 20 mg capsule     Vit C-Vit E-Copper-ZnOx-Lutein (PRESERVISION LUTEIN) 226 mg-200 unit -5 mg-0.8 mg cap Take  by mouth.  magnesium 250 mg tab Take  by mouth.  COQ10, UBIQUINOL, PO Take  by mouth.  cholecalciferol (VITAMIN D3) 1,000 unit cap Take  by mouth daily.  cyanocobalamin 1,000 mcg tablet Take 1,000 mcg by mouth daily.  folic acid (FOLVITE) 1 mg tablet Take  by mouth daily.      No current facility-administered medications for this visit. Past Medical History:   Diagnosis Date    GERD (gastroesophageal reflux disease)     Hyperactivity of bladder     Hypercholesterolemia     Macular degeneration disease     Skin cancer (melanoma) (HCC)     Tremor       Past Surgical History:   Procedure Laterality Date    HX HERNIA REPAIR      HX MOHS PROCEDURES        Social History     Tobacco Use    Smoking status: Never Smoker    Smokeless tobacco: Never Used   Substance Use Topics    Alcohol use: Yes     Alcohol/week: 5.0 standard drinks     Types: 5 Glasses of wine per week     Comment: beer or glass of wine nightly       Family History   Problem Relation Age of Onset    Cancer Father     Lung Cancer Father         smoker    Heart Disease Brother     Diabetes Brother     Diabetes Maternal Grandfather     Prostate Cancer Brother         Allergies   Allergen Reactions    Penicillins Hives        Assessment/Plan  Diagnoses and all orders for this visit:    1. Essential tremor-significantly improved but continued tremor. Will increase his dose to 250 mg at night. He will monitor for worsening fatigue.  -     primidone (MYSOLINE) 250 mg tablet; Take 1 Tab by mouth nightly. 2. Gastroesophageal reflux disease, esophagitis presence not specified-symptoms stable. 3. Hyperlipidemia, unspecified hyperlipidemia type-patient switch to atorvastatin. Will repeat lipids 3 months out from change    4. S/P TAVR (transcatheter aortic valve replacement)-exercise tolerance greatly improved  -     METABOLIC PANEL, COMPREHENSIVE; Future  -     CBC WITH AUTOMATED DIFF; Future    5. Atherosclerosis of native coronary artery of native heart without angina pectoris-currently in cardiac rehab and doing well  -     LIPID PANEL; Future    6. Nonrheumatic aortic valve stenosis    7. Cough-more than likely postnasal drip.   We will try nasal spray  -     ipratropium (ATROVENT) 0.03 % nasal spray; 2 Sprays by Both Nostrils route every twelve (12) hours. 8. Skin lesion-wound may not be healing due to cortisone being applied to the area. Patient will apply bacitracin stop steroid and see dermatology if this persists  -     bacitracin zinc (BACITRACIN) ointment; Apply  to affected area two (2) times a day. -     REFERRAL TO DERMATOLOGY    9. OAB (overactive bladder)-due to having issues. He will try lifestyle management and timed restroom breaks to see how this does. Otherwise we could consider Myrbetriq. Follow-up and Dispositions    · Return in about 6 months (around 7/21/2020) for Medicare Wellness. Roberto Jean MD  1/21/2020    This note was created with the help of speech recognition software [employer]ivas) and may contain some 'sound alike' errors.

## 2020-01-22 ENCOUNTER — HOSPITAL ENCOUNTER (OUTPATIENT)
Dept: CARDIAC REHAB | Age: 85
Discharge: HOME | End: 2020-01-22
Payer: MEDICARE

## 2020-01-22 ENCOUNTER — CLINICAL SUPPORT (OUTPATIENT)
Dept: CARDIOLOGY CLINIC | Age: 85
End: 2020-01-22

## 2020-01-22 ENCOUNTER — APPOINTMENT (OUTPATIENT)
Dept: GENERAL RADIOLOGY | Age: 85
End: 2020-01-22
Attending: EMERGENCY MEDICINE
Payer: MEDICARE

## 2020-01-22 ENCOUNTER — HOSPITAL ENCOUNTER (EMERGENCY)
Age: 85
Discharge: HOME | End: 2020-01-22
Attending: EMERGENCY MEDICINE | Admitting: EMERGENCY MEDICINE
Payer: MEDICARE

## 2020-01-22 ENCOUNTER — DOCUMENTATION ONLY (OUTPATIENT)
Dept: CARDIOLOGY CLINIC | Age: 85
End: 2020-01-22

## 2020-01-22 VITALS
WEIGHT: 175 LBS | BODY MASS INDEX: 26.52 KG/M2 | TEMPERATURE: 97.9 F | HEIGHT: 68 IN | DIASTOLIC BLOOD PRESSURE: 64 MMHG | OXYGEN SATURATION: 98 % | SYSTOLIC BLOOD PRESSURE: 117 MMHG | HEART RATE: 69 BPM | RESPIRATION RATE: 23 BRPM

## 2020-01-22 DIAGNOSIS — I48.0 PAROXYSMAL ATRIAL FIBRILLATION WITH RAPID VENTRICULAR RESPONSE (HCC): Primary | ICD-10-CM

## 2020-01-22 DIAGNOSIS — I48.91 ATRIAL FIBRILLATION, UNSPECIFIED TYPE (HCC): Primary | ICD-10-CM

## 2020-01-22 LAB
ALBUMIN SERPL-MCNC: 3.6 G/DL (ref 3.5–5)
ALBUMIN/GLOB SERPL: 1 {RATIO} (ref 1.1–2.2)
ALP SERPL-CCNC: 112 U/L (ref 45–117)
ALT SERPL-CCNC: 23 U/L (ref 12–78)
ANION GAP SERPL CALC-SCNC: 3 MMOL/L (ref 5–15)
AST SERPL-CCNC: 36 U/L (ref 15–37)
ATRIAL RATE: 316 BPM
ATRIAL RATE: 83 BPM
BASOPHILS # BLD: 0 K/UL (ref 0–0.1)
BASOPHILS NFR BLD: 1 % (ref 0–1)
BILIRUB SERPL-MCNC: 0.4 MG/DL (ref 0.2–1)
BNP SERPL-MCNC: 169 PG/ML
BUN SERPL-MCNC: 18 MG/DL (ref 6–20)
BUN/CREAT SERPL: 23 (ref 12–20)
CALCIUM SERPL-MCNC: 8.8 MG/DL (ref 8.5–10.1)
CALCULATED P AXIS, ECG09: -91 DEGREES
CALCULATED P AXIS, ECG09: 4 DEGREES
CALCULATED R AXIS, ECG10: -15 DEGREES
CALCULATED R AXIS, ECG10: -25 DEGREES
CALCULATED T AXIS, ECG11: 14 DEGREES
CALCULATED T AXIS, ECG11: 164 DEGREES
CHLORIDE SERPL-SCNC: 108 MMOL/L (ref 97–108)
CO2 SERPL-SCNC: 27 MMOL/L (ref 21–32)
COMMENT, HOLDF: NORMAL
CREAT SERPL-MCNC: 0.8 MG/DL (ref 0.7–1.3)
DIAGNOSIS, 93000: NORMAL
DIAGNOSIS, 93000: NORMAL
DIFFERENTIAL METHOD BLD: ABNORMAL
EOSINOPHIL # BLD: 0.3 K/UL (ref 0–0.4)
EOSINOPHIL NFR BLD: 6 % (ref 0–7)
ERYTHROCYTE [DISTWIDTH] IN BLOOD BY AUTOMATED COUNT: 13.2 % (ref 11.5–14.5)
GLOBULIN SER CALC-MCNC: 3.7 G/DL (ref 2–4)
GLUCOSE SERPL-MCNC: 122 MG/DL (ref 65–100)
HCT VFR BLD AUTO: 46.7 % (ref 36.6–50.3)
HGB BLD-MCNC: 14.8 G/DL (ref 12.1–17)
IMM GRANULOCYTES # BLD AUTO: 0 K/UL (ref 0–0.04)
IMM GRANULOCYTES NFR BLD AUTO: 0 % (ref 0–0.5)
INR PPP: 1.2 (ref 0.9–1.1)
LYMPHOCYTES # BLD: 1 K/UL (ref 0.8–3.5)
LYMPHOCYTES NFR BLD: 20 % (ref 12–49)
MAGNESIUM SERPL-MCNC: 2 MG/DL (ref 1.6–2.4)
MCH RBC QN AUTO: 29.4 PG (ref 26–34)
MCHC RBC AUTO-ENTMCNC: 31.7 G/DL (ref 30–36.5)
MCV RBC AUTO: 92.7 FL (ref 80–99)
MONOCYTES # BLD: 0.5 K/UL (ref 0–1)
MONOCYTES NFR BLD: 10 % (ref 5–13)
NEUTS SEG # BLD: 3.3 K/UL (ref 1.8–8)
NEUTS SEG NFR BLD: 64 % (ref 32–75)
NRBC # BLD: 0 K/UL (ref 0–0.01)
NRBC BLD-RTO: 0 PER 100 WBC
P-R INTERVAL, ECG05: 154 MS
PLATELET # BLD AUTO: 110 K/UL (ref 150–400)
PMV BLD AUTO: 10.9 FL (ref 8.9–12.9)
POTASSIUM SERPL-SCNC: 4.8 MMOL/L (ref 3.5–5.1)
PROT SERPL-MCNC: 7.3 G/DL (ref 6.4–8.2)
PROTHROMBIN TIME: 11.9 SEC (ref 9–11.1)
Q-T INTERVAL, ECG07: 256 MS
Q-T INTERVAL, ECG07: 376 MS
QRS DURATION, ECG06: 104 MS
QRS DURATION, ECG06: 96 MS
QTC CALCULATION (BEZET), ECG08: 415 MS
QTC CALCULATION (BEZET), ECG08: 441 MS
RBC # BLD AUTO: 5.04 M/UL (ref 4.1–5.7)
SAMPLES BEING HELD,HOLD: NORMAL
SODIUM SERPL-SCNC: 138 MMOL/L (ref 136–145)
TROPONIN I SERPL-MCNC: <0.05 NG/ML
VENTRICULAR RATE, ECG03: 158 BPM
VENTRICULAR RATE, ECG03: 83 BPM
WBC # BLD AUTO: 5.2 K/UL (ref 4.1–11.1)

## 2020-01-22 PROCEDURE — 84484 ASSAY OF TROPONIN QUANT: CPT

## 2020-01-22 PROCEDURE — 83735 ASSAY OF MAGNESIUM: CPT

## 2020-01-22 PROCEDURE — 83880 ASSAY OF NATRIURETIC PEPTIDE: CPT

## 2020-01-22 PROCEDURE — 80053 COMPREHEN METABOLIC PANEL: CPT

## 2020-01-22 PROCEDURE — 85025 COMPLETE CBC W/AUTO DIFF WBC: CPT

## 2020-01-22 PROCEDURE — 85610 PROTHROMBIN TIME: CPT

## 2020-01-22 PROCEDURE — 93005 ELECTROCARDIOGRAM TRACING: CPT

## 2020-01-22 PROCEDURE — 36415 COLL VENOUS BLD VENIPUNCTURE: CPT

## 2020-01-22 PROCEDURE — 99285 EMERGENCY DEPT VISIT HI MDM: CPT

## 2020-01-22 PROCEDURE — 71045 X-RAY EXAM CHEST 1 VIEW: CPT

## 2020-01-22 RX ORDER — METOPROLOL TARTRATE 25 MG/1
25 TABLET, FILM COATED ORAL 2 TIMES DAILY
Qty: 180 TAB | Refills: 1 | OUTPATIENT
Start: 2020-01-22 | End: 2020-01-30 | Stop reason: ALTCHOICE

## 2020-01-22 RX ORDER — METOPROLOL TARTRATE 25 MG/1
25 TABLET, FILM COATED ORAL 2 TIMES DAILY
Qty: 60 TAB | Refills: 0 | Status: SHIPPED | OUTPATIENT
Start: 2020-01-22 | End: 2020-01-30 | Stop reason: ALTCHOICE

## 2020-01-22 RX ORDER — DILTIAZEM HYDROCHLORIDE 5 MG/ML
0.25 INJECTION INTRAVENOUS
Status: DISCONTINUED | OUTPATIENT
Start: 2020-01-22 | End: 2020-01-22

## 2020-01-22 NOTE — ED TRIAGE NOTES
Pt arrives was at cardiac rehab and was sent down to ED for fast heart rate, pt denies any pain or feeling of palpations, pt goes to rehab for valve replacement in dec.

## 2020-01-22 NOTE — ED PROVIDER NOTES
HPI   63-year-old gentleman with a history of HLD, s/p TAVR, presents to the emergency department from cardiac rehab after he was found to have atrial fibrillation with RVR prior to arrival.  Patient denied any symptoms of palpitations, shortness of breath, chest pain, lightheadedness, syncope, abdominal pain, nausea, vomiting. He states that he has been having recent mild nasal congestion and intermittent cough but no fever chills. No dysuria or hematuria. Denies any history of prior atrial fibrillation. Follows with Dr. Robb Mckeon, cardiology. He states that he was seen by his primary care provider yesterday and did not have any abnormal heart rhythms at that time reportedly. Patient was asymptomatic so uncertain as to when his symptoms started. Past Medical History:   Diagnosis Date    GERD (gastroesophageal reflux disease)     Hyperactivity of bladder     Hypercholesterolemia     Macular degeneration disease     Skin cancer (melanoma) (HCC)     Tremor        Past Surgical History:   Procedure Laterality Date    HX HERNIA REPAIR      HX MOHS PROCEDURES           Family History:   Problem Relation Age of Onset    Cancer Father     Lung Cancer Father         smoker    Heart Disease Brother     Diabetes Brother     Diabetes Maternal Grandfather     Prostate Cancer Brother        Social History     Socioeconomic History    Marital status:      Spouse name: Not on file    Number of children: Not on file    Years of education: Not on file    Highest education level: Not on file   Occupational History    Not on file   Social Needs    Financial resource strain: Not on file    Food insecurity:     Worry: Not on file     Inability: Not on file    Transportation needs:     Medical: Not on file     Non-medical: Not on file   Tobacco Use    Smoking status: Never Smoker    Smokeless tobacco: Never Used   Substance and Sexual Activity    Alcohol use:  Yes     Alcohol/week: 5.0 standard drinks     Types: 5 Glasses of wine per week     Comment: beer or glass of wine nightly     Drug use: Never    Sexual activity: Not Currently     Partners: Female   Lifestyle    Physical activity:     Days per week: Not on file     Minutes per session: Not on file    Stress: Not on file   Relationships    Social connections:     Talks on phone: Not on file     Gets together: Not on file     Attends Denominational service: Not on file     Active member of club or organization: Not on file     Attends meetings of clubs or organizations: Not on file     Relationship status: Not on file    Intimate partner violence:     Fear of current or ex partner: Not on file     Emotionally abused: Not on file     Physically abused: Not on file     Forced sexual activity: Not on file   Other Topics Concern     Service Not Asked    Blood Transfusions Not Asked    Caffeine Concern Not Asked    Occupational Exposure Not Asked   [place] Hazards Not Asked    Sleep Concern Not Asked    Stress Concern Not Asked    Weight Concern Not Asked    Special Diet Not Asked    Back Care Not Asked    Exercise Not Asked    Bike Helmet Not Asked   [address] Not Asked    Self-Exams Not Asked   Social History Narrative    Not on file         ALLERGIES: Penicillins    Review of Systems   Constitutional: Negative for activity change, appetite change, chills and fever. HENT: Positive for congestion. Negative for rhinorrhea, sinus pain, sneezing and sore throat. Eyes: Negative for photophobia and visual disturbance. Respiratory: Positive for cough. Negative for chest tightness and shortness of breath. Cardiovascular: Negative for chest pain, palpitations and leg swelling. Gastrointestinal: Negative for abdominal pain, blood in stool, constipation, diarrhea, nausea and vomiting. Genitourinary: Negative for difficulty urinating, dysuria, flank pain, hematuria, penile pain and testicular pain.    Musculoskeletal: Negative for arthralgias, back pain, myalgias and neck pain. Skin: Negative for rash and wound. Neurological: Negative for syncope, weakness, light-headedness, numbness and headaches. Psychiatric/Behavioral: Negative for self-injury and suicidal ideas. All other systems reviewed and are negative. Vitals:    01/22/20 1400   BP: 134/89   Pulse: (!) 159   Resp: 18   Temp: 97.9 °F (36.6 °C)   SpO2: 95%   Weight: 79.4 kg (175 lb)   Height: 5' 8\" (1.727 m)            Physical Exam  Vitals signs and nursing note reviewed. Constitutional:       General: He is not in acute distress. Appearance: He is well-developed. He is not diaphoretic. HENT:      Head: Normocephalic and atraumatic. Nose: Nose normal.   Eyes:      Conjunctiva/sclera: Conjunctivae normal.      Pupils: Pupils are equal, round, and reactive to light. Neck:      Musculoskeletal: Neck supple. Cardiovascular:      Rate and Rhythm: Tachycardia present. Rhythm irregular. Heart sounds: Normal heart sounds. Pulmonary:      Effort: Pulmonary effort is normal.      Breath sounds: Normal breath sounds. Abdominal:      General: There is no distension. Palpations: Abdomen is soft. Tenderness: There is no tenderness. Musculoskeletal:         General: No tenderness. Skin:     General: Skin is warm and dry. Neurological:      Mental Status: He is alert and oriented to person, place, and time. GCS: GCS eye subscore is 4. GCS verbal subscore is 5. GCS motor subscore is 6. Cranial Nerves: No cranial nerve deficit. Sensory: No sensory deficit. Coordination: Coordination normal.          MDM    80 y.o. male presents with a. Fib with RVR and converted back to NSR while in the ED. Labs returned showing no significant abnormalities. CXR viewed by myself and read by radiology showing no acute abnormalities compared to prior images, showing interstitial prominence. Cardiology consulted.     Procedures    1400 EKG shows A. fib with RVR with occasional PVCs with a rate of 158 with ST depression inferolaterally but no ST elevation. 1437 repeat EKG shows normal sinus rhythm with occasional PACs with a rate of 83 bpm with no further ST depression no ST elevation or to abnormality suggestive of ischemia. 3:15 PM discussed case with Dr. Phuong Moralez, cardiology, who agreed to come see the patient at the bedside. Started him on NOAC and scheduled for close outpatient follow up. Return precautions were given for worsening or concerns.

## 2020-01-22 NOTE — CONSULTS
Janet Mendez DO  [facility] [address], [address] [facility], [address] [place] [address] N[place]                                       Office [phone],PNB [phone]  Office [phone],MDF [phone]      Date of  Admission: 1/22/2020  1:49 PM  PCP- Horacio Regan MD    Zeyad Mary is a 80 y.o. male admitted for No admission diagnoses are documented for this encounter. . Consult requested by Tolu Madrid DO    Assessment/Plan      New onset atrial fibrillation and atrial flutter  CAD s/p LAD EFFIE  AS s/p TAVR  Essential tremor  GERD    Presented to cardiac rehab today. Found to have significantly elevated HR and referred to ED. Pt asymptomatic. On arrival to ED he was in 2:1 atrial flutter, subsequently in atrial fibrillation. Spontaneously converted to sinus rhythm. - start eliquis 5mg twice daily. Reviewed risk. benefit of bleeding and stroke ppx.  - start low dose metoprolol 25mg twice daily  - 7 day event monitor to understand ambulatory HR and AF burden  - d/c aspirin and continue plavix with DOAC  - f/up with Dr. Poppy Lee next week    30 day supply of Rx sent to local pharmacy, [facility]. 90 day Rx sent to [email or web address]         I appreciate the opportunity to be involved in . See below note for details. Please do not hesitate to contact us with questions or concerns. Drea De DO      Subjective: Zeyad Mary is a 80 y.o. male presents to ED with tachycardia. S/p LAD stenting and TAVR 12/2019. Presented to cardiac rehab today and found to be tachycardiac. Referred to ED. Initial ECG shows 2:1 atrial flutter. Telemetry also shows atrial fibrillation. Converted to sinus spontaneously in ED. No chest pain, sob, lightheadedness, dizziness with atrial fibrillation. No bleeding history.     Past Medical History:   Diagnosis Date    GERD (gastroesophageal reflux disease)     Hyperactivity of bladder     Hypercholesterolemia     Macular degeneration disease     Skin cancer (melanoma) (HCC)     Tremor       Past Surgical History:   Procedure Laterality Date    HX HERNIA REPAIR      HX MOHS PROCEDURES       Allergies   Allergen Reactions    Penicillins Hives     Family History   Problem Relation Age of Onset    Cancer Father     Lung Cancer Father         smoker    Heart Disease Brother     Diabetes Brother     Diabetes Maternal Grandfather     Prostate Cancer Brother       Social History     Tobacco Use    Smoking status: Never Smoker    Smokeless tobacco: Never Used   Substance Use Topics    Alcohol use: Yes     Alcohol/week: 5.0 standard drinks     Types: 5 Glasses of wine per week     Comment: beer or glass of wine nightly     Drug use: Never          Medications:  (Not in a hospital admission)    No current facility-administered medications for this encounter. Current Outpatient Medications   Medication Sig    apixaban (ELIQUIS) 5 mg tablet Take 1 Tab by mouth two (2) times a day.  metoprolol tartrate (LOPRESSOR) 25 mg tablet Take 1 Tab by mouth two (2) times a day.  apixaban (ELIQUIS) 5 mg tablet Take 1 Tab by mouth two (2) times a day.  metoprolol tartrate (LOPRESSOR) 25 mg tablet Take 1 Tab by mouth two (2) times a day.  primidone (MYSOLINE) 250 mg tablet Take 1 Tab by mouth nightly.  ipratropium (ATROVENT) 0.03 % nasal spray 2 Sprays by Both Nostrils route every twelve (12) hours.  bacitracin zinc (BACITRACIN) ointment Apply  to affected area two (2) times a day.  acetaminophen (TYLENOL) 325 mg tablet Take 2 Tabs by mouth every four (4) hours as needed for Pain or Fever.  magnesium oxide (MAG-OX) 400 mg tablet Take 400 mg by mouth daily.  clopidogrel (PLAVIX) 75 mg tab Take 1 Tab by mouth daily for 360 days.  atorvastatin (LIPITOR) 20 mg tablet Take 2 Tabs by mouth daily for 360 days.  DETROL LA 4 mg ER capsule Take 1 Cap by mouth daily.     NIASPAN 500 mg tablet daily.  omeprazole (PRILOSEC) 20 mg capsule     Vit C-Vit E-Copper-ZnOx-Lutein (PRESERVISION LUTEIN) 226 mg-200 unit -5 mg-0.8 mg cap Take  by mouth.  magnesium 250 mg tab Take  by mouth.  COQ10, UBIQUINOL, PO Take  by mouth.  cholecalciferol (VITAMIN D3) 1,000 unit cap Take  by mouth daily.  cyanocobalamin 1,000 mcg tablet Take 1,000 mcg by mouth daily.  folic acid (FOLVITE) 1 mg tablet Take  by mouth daily. Review of Systems:  Pertinent Positive: negative  Pertinent Negative:No chest pain, dyspnea on exertion, shortness of breath, orthopnea, PND    All Other systems reviewed and are negative for a Comprehensive ROS (10+)        Physical Exam:  Visit Vitals  /89 (BP 1 Location: Left arm, BP Patient Position: At rest;Sitting)   Pulse (!) 159   Temp 97.9 °F (36.6 °C)   Resp 18   Ht 5' 8\" (1.727 m)   Wt 175 lb (79.4 kg)   SpO2 95%   BMI 26.61 kg/m²           Gen: Well-developed, well-nourished, in no acute distress  HEENT:  Pink conjunctivae, hearing intact to voice, moist mucous membranes  Neck: Supple,No JVD, No Carotid Bruit  Resp: No accessory muscle use, Clear breath sounds, No rales or rhonchi  Card: Normal Rate,Regular Rythm,Normal S1, S2, No murmurs, rubs or gallop. No thrills. Abd:  Soft, non-tender, non-distended, normoactive bowel sounds are present   MSK: No cyanosis or clubbing  Skin: No rashes or ulcers  Neuro:  Cranial nerves are grossly intact, moving all four extremities, no focal deficit, follows commands appropriately  Psych:  Good insight, oriented to person, place and time, alert, Nml Affect  LE: No edema  Vascular:Distal Pulses 2+ and symmetric      Telemetry: AFIB, atrial flutter    EK:1 atrial flutter, sinus rhythm.      Cardiac Testing/ Procedures:    19   CARDIAC PROCEDURE 2019    Narrative Pre-procedure Diagnoses   Severe aortic stenosis [I35.0]   Post-procedure Diagnoses   S/P TAVR (transcatheter aortic valve replacement) [Z95.2]   Procedures   TRANSCATHETER AORTIC VALVE REPLACEMENT [[id number] (Custom)]           []Hide copied text    []Garo for details  PROCEDURALISTS:?   Surgeon 1. Federica Norman MD?  CoSurgeon 1. Kelsie Rios MD.      PROCEDURES:     1. Angiography of the ascending aorta and aortoiliac bifurcation  2. Temporary transvenous pacemaker insertion  3. Left heart catheterization  4. Implantation of a 26 mm Watson S3 transcatheter aortic valve via the   right transfemoral approach  5. 14F right?femoral artery access with Perclose pre-closure / 6F left   femoral vein access with manual compression / 6F left femoral artery   access with Perclose closure     INDICATIONS:?Adolph Suzanne Dakins is a 80 y.o. ?old male    with severe symptomatic aortic stenosis, NYHA Class III symptom status. ? He is referred for transfemoral TAVR procedure. He was deemed as   intermediate risk for SAVR.     PRE-OP DIAGNOSIS:     1. Severe, symptomatic aortic stenosis (NYHA, Class III)  2. Coronary artery disease  3. No Cardiomyopathy     POST-OP DIAGNOSIS:  1. Status post successful implantation of a 26 mm Watson S3 aortic valve   via the right transfemoral approach  2. Post operative conduction block in the form of LBBB with QRS <150 msec   and normal UT     PROCEDURAL DETAILS: The risks (death, myocardial infarction, stroke,   bleeding, vascular complications, renal dysfunction, allergic reactions,   and other), benefits, and alternatives were explained and discussed. Signed, informed consent was obtained. The patient was placed on the table   and prepped and draped in the usual fashion.     ARTERIAL ACCESS:?      - Right: Right femoral artery access was obtained using ultrasound and a   micropuncture needle and sheath system. ? Angiography confirmed adequate   position within the mid right CFA without significant angiographic   stenosis or irregularity.  Pre-close technique was performed using two   orthogonally positioned Perclose closure devices. ? A 6F sheath was placed   before it was later up-sized to the 16F E-Sheath?system. ? Post-procedure,   the sheath was removed and the arteriotomy was closed using the pre-close   technique. Final hemostasis was excellent.     - Left: A 6 Serbian sheath was placed in the left common femoral artery   without difficulty. ? Post-procedure, the sheath was removed and hemostasis   was achieved with a Perclose closure device.     - A 7F sheath was placed in the left? common femoral vein without   difficulty. Post-procedure the sheath was removed and manual compression   was applied to achieve hemostasis.     PROCEDURAL MEDICATIONS:?      Conscious sedation - Moderate levelconscious sedation. Please see   Anesthesia record for full details. Local anesthesia - 1% lidocaine was   administered to the bilateral groins. ??      CATHETERS:?      Ascending aortic and aortoiliac angiography: 5F Pigtail catheter  Left heart catheterization: 5F pigtail/JR caheters     CONTRAST VOLUME:?65 mL Omnipaque  BLOOD LOSS:Minimal  COMPLICATIONS:?None  SPECIMENS:?None     FINDINGS:  ANGIOGRAPHY OF THE ASCENDING AORTA:? There is significant calcification of   the aortic valve and annulus. ? Appropriate co-planar views were identified   for valve deployment. Post-deployment, the S3 valve appeared well   positioned with appropriate function and only trace to mild para-valvular   aortic regurgitation. ? Coronary arteries remained patent without   obstruction. ?      AORTOILIAC ANGIOGRAPHY:?The abdominal aorta and bilateral iliac arteries   are widely patent without evidence of aneurysm or stenosis. ? There is no   evidence of dissection, perforation or other complications. ?      INTERVENTION:     - A temporary transvenous pacemaker wire was inserted via the left common   femoral vein access and positioned in the RV apical position. ? Appropriate   position and function were confirmed.  Rapid ventricular pacing (180BPM)   was performed in concert with the valve implantations procedures.     - Left heart catheterization: aortic valve crossed with a 0.035 inch   straight wire and this wire was exchanged out for an Confida wire using a   5F angled pigtail catheter. Systemic heparin was administered to maintain   an ACT between 250-300 seconds.     - Transaortic valve implantation: Rapid pacing performed in coordination   with deployment of a 29mm Watson Alfreda S3 valve. ? Post-deployment   angiography and transthoracic echocardiography confirmed appropriate   position and function, there was trace-to-mild para-valvular   regurgitation. LV function was preserved. ? There was no pericardial   effusion. ?      - The temporary pacemaker was removed.     CONCLUSIONS:  1. Successful transfemoral aortic valve implantation (Watson 26mm S3) via   the right transfemoral access. 2. Temporary transvenous pacemaker insertion with evidence of post-TAVR   conduction abnormality-LBBB  3. 14F?CFA, 6F left CFA, and 6F left? CFV access. Right CFA pre-close and   left? CFA Perclose arteriotomy closures.     COMMENTS: Recommend standard post-cardiac catheterization care and   continued secondary risk factor modification.     - Admit to CVICU  - Aspirin and Plavix  - Echocardiogram, labs and ECG in AM  - Early ambulation             Signed by: Mariah Rdz MD     01/14/20   ECHO ADULT COMPLETE 01/17/2020 1/17/2020    Narrative · Normal cavity size and systolic function (ejection fraction normal). Mild concentric hypertrophy. Calculated left ventricular ejection fraction   is 63%. Biplane method used to measure ejection fraction. No regional wall   motion abnormality noted. Mild (grade 1) left ventricular diastolic   dysfunction. · Prosthetic aortic valve. There is a 26 mm Watson ALFREDA prosthetic   aortic valve from prior TAVR procedure. Prosthesis is normal. Trace   paravalvular leak is present. Gradients are appropriate for this valve. · Mitral valve thickening. Mild mitral annular calcification. Mild mitral   valve regurgitation is present.         Signed by: Bre Valdez MD         LABS:    Lab Results   Component Value Date/Time    WBC 5.2 01/22/2020 02:04 PM    HGB 14.8 01/22/2020 02:04 PM    HCT 46.7 01/22/2020 02:04 PM    PLATELET 750 (L) 80/32/6512 02:04 PM    MCV 92.7 01/22/2020 02:04 PM     Lab Results   Component Value Date/Time    Sodium 138 01/22/2020 02:04 PM    Potassium 4.8 01/22/2020 02:04 PM    Chloride 108 01/22/2020 02:04 PM    CO2 27 01/22/2020 02:04 PM    Anion gap 3 (L) 01/22/2020 02:04 PM    Glucose 122 (H) 01/22/2020 02:04 PM    BUN 18 01/22/2020 02:04 PM    Creatinine 0.80 01/22/2020 02:04 PM    BUN/Creatinine ratio 23 (H) 01/22/2020 02:04 PM    GFR est AA >60 01/22/2020 02:04 PM    GFR est non-AA >60 01/22/2020 02:04 PM    Calcium 8.8 01/22/2020 02:04 PM     Lab Results   Component Value Date/Time    Troponin-I, Qt. <0.05 01/22/2020 02:04 PM     Lab Results   Component Value Date/Time    aPTT 30.7 12/20/2019 10:59 AM     Lab Results   Component Value Date/Time    INR 1.2 (H) 01/22/2020 02:04 PM    INR 1.2 (H) 12/20/2019 10:59 AM    INR 1.2 (H) 12/17/2019 10:15 AM    Prothrombin time 11.9 (H) 01/22/2020 02:04 PM    Prothrombin time 12.2 (H) 12/20/2019 10:59 AM    Prothrombin time 11.7 (H) 12/17/2019 10:15 AM           Denny Gaitan DO

## 2020-01-23 ENCOUNTER — TELEPHONE (OUTPATIENT)
Dept: CARDIOLOGY CLINIC | Age: 85
End: 2020-01-23

## 2020-01-23 DIAGNOSIS — I48.91 ATRIAL FIBRILLATION, UNSPECIFIED TYPE (HCC): Primary | ICD-10-CM

## 2020-01-23 NOTE — TELEPHONE ENCOUNTER
Patient needs a 7 day event monitor per VO Dr Shawna Peace for atrial fibrillation. Dr Lane Nicole to read.

## 2020-01-24 ENCOUNTER — APPOINTMENT (OUTPATIENT)
Dept: CARDIAC REHAB | Age: 85
End: 2020-01-24
Payer: MEDICARE

## 2020-01-30 ENCOUNTER — OFFICE VISIT (OUTPATIENT)
Dept: CARDIOLOGY CLINIC | Age: 85
End: 2020-01-30

## 2020-01-30 ENCOUNTER — TELEPHONE (OUTPATIENT)
Dept: CARDIOLOGY CLINIC | Age: 85
End: 2020-01-30

## 2020-01-30 VITALS
SYSTOLIC BLOOD PRESSURE: 118 MMHG | DIASTOLIC BLOOD PRESSURE: 62 MMHG | OXYGEN SATURATION: 97 % | HEIGHT: 68 IN | WEIGHT: 178.9 LBS | BODY MASS INDEX: 27.11 KG/M2 | HEART RATE: 51 BPM

## 2020-01-30 DIAGNOSIS — D69.6 THROMBOCYTOPENIA (HCC): ICD-10-CM

## 2020-01-30 DIAGNOSIS — E78.5 HYPERLIPIDEMIA, UNSPECIFIED HYPERLIPIDEMIA TYPE: ICD-10-CM

## 2020-01-30 DIAGNOSIS — I35.0 NONRHEUMATIC AORTIC VALVE STENOSIS: ICD-10-CM

## 2020-01-30 DIAGNOSIS — Z95.2 S/P TAVR (TRANSCATHETER AORTIC VALVE REPLACEMENT): ICD-10-CM

## 2020-01-30 DIAGNOSIS — G25.0 ESSENTIAL TREMOR: ICD-10-CM

## 2020-01-30 DIAGNOSIS — I25.10 CORONARY ARTERY DISEASE INVOLVING NATIVE CORONARY ARTERY OF NATIVE HEART WITHOUT ANGINA PECTORIS: Primary | ICD-10-CM

## 2020-01-30 RX ORDER — METOPROLOL TARTRATE 25 MG/1
25 TABLET, FILM COATED ORAL 2 TIMES DAILY
Qty: 180 TAB | Refills: 1 | Status: SHIPPED | OUTPATIENT
Start: 2020-01-30 | End: 2020-07-21

## 2020-01-30 NOTE — TELEPHONE ENCOUNTER
[facility] is calling to discuss a medication interaction.  Please call back at [phone] Ref #[id number]

## 2020-01-30 NOTE — LETTER
2/2/20 Patient: Kate Willis YOB: 1934 Date of Visit: 1/30/2020 Katja Whitt MD 
[address] [facility] [address] VIA In Basket Dear Katja Whitt MD, Thank you for referring Mr. Shad Herrera to [facility] for evaluation. My notes for this consultation are attached. If you have questions, please do not hesitate to call me. I look forward to following your patient along with you.  
 
 
Sincerely, 
 
Andreea Gama NP

## 2020-01-30 NOTE — PROGRESS NOTES
Chief Complaint   Patient presents with    Coronary Artery Disease     Visit Vitals  /62 (BP 1 Location: Left arm, BP Patient Position: Sitting)   Pulse (!) 51   Ht 5' 8\" (1.727 m)   Wt 178 lb 14.4 oz (81.1 kg)   SpO2 97%   BMI 27.20 kg/m²     Patient in offices states no cardiac complaints. No hospital stays.     No cardiac refills

## 2020-01-30 NOTE — PATIENT INSTRUCTIONS
Please get labs today on the 3rd floor or next week. Follow-up with MD in 6 weeks. (already scheduled)

## 2020-01-30 NOTE — PROGRESS NOTES
Anthony Pantoja, [address]  [address]  [place], [address] [facility]    Office [phone]  Fax [phone]        Tara Arriaga is a 80 y.o. male who is followed outpatient by Dr. Taras Teague. Patient is here today for ED follow-up; seen on 1/22/20 for new onset afib with RVR. Assessment  Encounter Diagnoses   Name Primary?  Coronary artery disease involving native coronary artery of native heart without angina pectoris Yes    Atrial fibrillation/flutter (HCC)     S/P TAVR (transcatheter aortic valve replacement)     Nonrheumatic aortic valve stenosis     Hyperlipidemia, unspecified hyperlipidemia type     Essential tremor     Thrombocytopenia (HCC)      Recommendations:  Afib / Nathaly Homans - RRR today, on metoprolol 25mg BID and eliquis 5mg BID - check updated CBC. Wearing 7d heart monitor; no afib noted to date. TSH wnl. No s/s concerning for sleep apnea. aortic stenosis s/p TAVR 12/20/19 - in cardiac rehab; echo 1/14/20 suggested excellent valve function with trace paravalvular leak. Normal LV systolic function. On apixaban and plavix. CAD s/p rotational atherectomy and PCI with EFFIE 12/4/2019     Hyperlipidemia - on atorva 40mg/d. Obtain recent lipids from PCP. Follow-up and Dispositions    · Return in about 6 weeks (around 3/12/2020), or if symptoms worsen or fail to improve. f/u in 6 weeks or PRN. Patient understands the plan. All questions were answered to the patient's satisfaction.     Medication Side Effects and Warnings were discussed with patient: yes  Patient Labs were reviewed and or requested:  yes  Patient Past Records were reviewed and or requested: yes     I appreciate the opportunity to be involved in patient's care. Please do not hesitate to contact us with questions or concerns.       Subjective:  80-year-old gentleman with a history of HLD, s/p TAVR, presented to the emergency department 1/22/20 from cardiac rehab after he was found to have atrial fibrillation with RVR prior to arrival.  EKG in ED aflutter with rate 150s. Converted back to sinus while in ED. Started on NOAC and dc'd home with 7d monitor. Pt is still wearing monitor currently. Pt was asymptomatic in afib/flutter. Patient denies any chest pain, dyspnea, palpitations, syncope, orthopnea, edema or paroxysmal nocturnal dyspnea. Pt is s/p TAVR 12/20/19 by Dr. Axel Patel; post op echo without complication. Pt has been doing well and attending rehab regularly. Takes all meds as prescribed. No bleeding issues since starting apixaban. Notes bruising more easily than normal.        Cardiac testing  ECG: Sinus rhythm frequent PACs mild intraventricular conduction delay with QRS of 110 ms. Echocardiogram: October 2019 personally reviewed: LV systolic function 55 to 43%. Severe aortic stenosis. Calculated aortic valve area 0.7 cm², peak velocity 5.3 m/s, peak gradient 110 mmHg, mean gradient 60 mmHg. Mild aortic regurgitation, mild tricuspid regurg without pulmonary hypertension. :Echocardiogram: January 2019 personally reviewed: LV systolic function 55 to 12%.  Post op echo mean gradient of 7 mm Hg with trace PVL  Carotid Dopplers: No significant obstructive disease  Cardiac catheterization: Severe single-vessel, mild to moderate single-vessel disease.  Status post PCI to LAD with drug-eluting stent. ECG January 2020 post TAVR: Sinus rhythm, PAC, no significant conduction abnormality. Past Medical History:   Diagnosis Date    GERD (gastroesophageal reflux disease)     Hyperactivity of bladder     Hypercholesterolemia     Macular degeneration disease     Skin cancer (melanoma) (HCC)     Tremor         Current Outpatient Medications   Medication Sig Dispense Refill    apixaban (ELIQUIS) 5 mg tablet Take 1 Tab by mouth two (2) times a day. 180 Tab 1    metoprolol tartrate (LOPRESSOR) 25 mg tablet Take 1 Tab by mouth two (2) times a day.  180 Tab 1    primidone (MYSOLINE) 250 mg tablet Take 1 Tab by mouth nightly. 90 Tab 1    ipratropium (ATROVENT) 0.03 % nasal spray 2 Sprays by Both Nostrils route every twelve (12) hours. 30 mL 1    bacitracin zinc (BACITRACIN) ointment Apply  to affected area two (2) times a day. 15 g 0    acetaminophen (TYLENOL) 325 mg tablet Take 2 Tabs by mouth every four (4) hours as needed for Pain or Fever. 60 Tab 2    magnesium oxide (MAG-OX) 400 mg tablet Take 400 mg by mouth daily.  atorvastatin (LIPITOR) 20 mg tablet Take 2 Tabs by mouth daily for 360 days. 180 Tab 3    DETROL LA 4 mg ER capsule Take 1 Cap by mouth daily. 90 Cap 1    NIASPAN 500 mg tablet daily.  omeprazole (PRILOSEC) 20 mg capsule       Vit C-Vit E-Copper-ZnOx-Lutein (PRESERVISION LUTEIN) 226 mg-200 unit -5 mg-0.8 mg cap Take  by mouth.  COQ10, UBIQUINOL, PO Take  by mouth.  cholecalciferol (VITAMIN D3) 1,000 unit cap Take  by mouth daily.  cyanocobalamin 1,000 mcg tablet Take 1,000 mcg by mouth daily.  clopidogrel (PLAVIX) 75 mg tab Take 1 Tab by mouth daily for 360 days. 90 Tab 3    folic acid (FOLVITE) 1 mg tablet Take  by mouth daily. Allergies   Allergen Reactions    Penicillins Hives      Review of Systems  Constitutional: Negative for fever, chills, and diaphoresis. Respiratory: Negative for cough, hemoptysis, sputum production, shortness of breath and wheezing. Cardiovascular: Negative for chest pain, palpitations, orthopnea, claudication, leg swelling and PND. Gastrointestinal: Negative for heartburn, nausea, vomiting, blood in stool and melena. Genitourinary: Negative for dysuria and flank pain. Neurological: Negative for focal weakness, seizures, loss of consciousness, weakness and headaches. Endo/Heme/Allergies: Negative for abnormal bleeding. Psychiatric/Behavioral: Negative for memory loss.        Physical Exam    Visit Vitals  /62 (BP 1 Location: Left arm, BP Patient Position: Sitting) Pulse (!) 51   Ht 5' 8\" (1.727 m)   Wt 81.1 kg (178 lb 14.4 oz)   SpO2 97%   BMI 27.20 kg/m²     Wt Readings from Last 3 Encounters:   01/31/20 80.3 kg (177 lb)   01/30/20 81.1 kg (178 lb 14.4 oz)   01/22/20 79.4 kg (175 lb)      General - well developed well nourished  Neck - neck supple, no JVD  Cardiac - normal S1, S2, RRR, soft systolic murmur, rubs or gallops. No clicks  Vascular - radials and pedal pulses equal bilateral  Lungs - clear to auscultation bilaterals, no rales, wheezing or rhonchi  Abd - soft nontender, non-distended, +BS  Extremities - no edema, warm, well perfused  Neuro - nonfocal ,+tremor (mild)  Psych - normal mood and affect      Labs  Lab Results   Component Value Date/Time    Sodium 138 01/22/2020 02:04 PM    Potassium 4.8 01/22/2020 02:04 PM    Chloride 108 01/22/2020 02:04 PM    CO2 27 01/22/2020 02:04 PM    Anion gap 3 (L) 01/22/2020 02:04 PM    Glucose 122 (H) 01/22/2020 02:04 PM    BUN 18 01/22/2020 02:04 PM    Creatinine 0.80 01/22/2020 02:04 PM    BUN/Creatinine ratio 23 (H) 01/22/2020 02:04 PM    GFR est AA >60 01/22/2020 02:04 PM    GFR est non-AA >60 01/22/2020 02:04 PM    Calcium 8.8 01/22/2020 02:04 PM    Bilirubin, total 0.4 01/22/2020 02:04 PM    AST (SGOT) 36 01/22/2020 02:04 PM    Alk.  phosphatase 112 01/22/2020 02:04 PM    Protein, total 7.3 01/22/2020 02:04 PM    Albumin 3.6 01/22/2020 02:04 PM    Globulin 3.7 01/22/2020 02:04 PM    A-G Ratio 1.0 (L) 01/22/2020 02:04 PM    ALT (SGPT) 23 01/22/2020 02:04 PM     Lab Results   Component Value Date/Time    WBC 5.2 01/22/2020 02:04 PM    HGB 14.8 01/22/2020 02:04 PM    HCT 46.7 01/22/2020 02:04 PM    PLATELET 341 (L) 97/48/3890 02:04 PM    MCV 92.7 01/22/2020 02:04 PM     Lab Results   Component Value Date/Time    Hemoglobin A1c 5.4 12/17/2019 10:15 AM     No results found for: CHOL, CHOLPOCT, CHOLX, CHLST, CHOLV, HDL, HDLPOC, HDLP, LDL, LDLCPOC, LDLC, DLDLP, VLDLC, VLDL, TGLX, TRIGL, TRIGP, TGLPOCT, CHHD, CHHDX     Lab Results   Component Value Date/Time    TSH 1.28 12/17/2019 10:15 AM       AMINATA Saeed

## 2020-01-31 ENCOUNTER — HOSPITAL ENCOUNTER (OUTPATIENT)
Dept: CARDIAC REHAB | Age: 85
Discharge: HOME | End: 2020-01-31
Payer: MEDICARE

## 2020-01-31 VITALS — WEIGHT: 177 LBS | BODY MASS INDEX: 26.91 KG/M2

## 2020-01-31 PROCEDURE — 93798 PHYS/QHP OP CAR RHAB W/ECG: CPT

## 2020-02-04 ENCOUNTER — HOSPITAL ENCOUNTER (OUTPATIENT)
Dept: CARDIAC REHAB | Age: 85
Discharge: HOME | End: 2020-02-04
Payer: MEDICARE

## 2020-02-04 VITALS — BODY MASS INDEX: 26.3 KG/M2 | WEIGHT: 173 LBS

## 2020-02-04 PROCEDURE — 93798 PHYS/QHP OP CAR RHAB W/ECG: CPT

## 2020-02-05 RX ORDER — OMEPRAZOLE 20 MG/1
20 CAPSULE, DELAYED RELEASE ORAL DAILY
Qty: 90 CAP | Refills: 1 | Status: SHIPPED | OUTPATIENT
Start: 2020-02-05 | End: 2020-06-23 | Stop reason: SDUPTHER

## 2020-02-07 ENCOUNTER — HOSPITAL ENCOUNTER (OUTPATIENT)
Dept: CARDIAC REHAB | Age: 85
Discharge: HOME | End: 2020-02-07
Payer: MEDICARE

## 2020-02-07 VITALS — WEIGHT: 174 LBS | BODY MASS INDEX: 26.46 KG/M2

## 2020-02-07 PROCEDURE — 93798 PHYS/QHP OP CAR RHAB W/ECG: CPT

## 2020-02-11 ENCOUNTER — HOSPITAL ENCOUNTER (OUTPATIENT)
Dept: CARDIAC REHAB | Age: 85
Discharge: HOME | End: 2020-02-11
Payer: MEDICARE

## 2020-02-11 VITALS — WEIGHT: 175 LBS | BODY MASS INDEX: 26.61 KG/M2

## 2020-02-11 PROCEDURE — 93798 PHYS/QHP OP CAR RHAB W/ECG: CPT

## 2020-02-12 ENCOUNTER — OFFICE VISIT (OUTPATIENT)
Dept: INTERNAL MEDICINE CLINIC | Age: 85
End: 2020-02-12

## 2020-02-12 VITALS
BODY MASS INDEX: 26.98 KG/M2 | RESPIRATION RATE: 16 BRPM | HEIGHT: 68 IN | SYSTOLIC BLOOD PRESSURE: 112 MMHG | OXYGEN SATURATION: 93 % | DIASTOLIC BLOOD PRESSURE: 72 MMHG | WEIGHT: 178 LBS | HEART RATE: 58 BPM | TEMPERATURE: 98 F

## 2020-02-12 DIAGNOSIS — I25.10 CORONARY ARTERY DISEASE INVOLVING NATIVE CORONARY ARTERY OF NATIVE HEART WITHOUT ANGINA PECTORIS: ICD-10-CM

## 2020-02-12 DIAGNOSIS — K21.9 GASTROESOPHAGEAL REFLUX DISEASE, ESOPHAGITIS PRESENCE NOT SPECIFIED: ICD-10-CM

## 2020-02-12 DIAGNOSIS — I48.0 PAF (PAROXYSMAL ATRIAL FIBRILLATION) (HCC): ICD-10-CM

## 2020-02-12 DIAGNOSIS — E78.5 HYPERLIPIDEMIA, UNSPECIFIED HYPERLIPIDEMIA TYPE: ICD-10-CM

## 2020-02-12 DIAGNOSIS — G25.0 ESSENTIAL TREMOR: ICD-10-CM

## 2020-02-12 DIAGNOSIS — R05.8 UPPER AIRWAY COUGH SYNDROME: Primary | ICD-10-CM

## 2020-02-12 RX ORDER — FAMOTIDINE 20 MG/1
20 TABLET, FILM COATED ORAL DAILY
COMMUNITY
End: 2020-06-23

## 2020-02-12 RX ORDER — MONTELUKAST SODIUM 10 MG/1
10 TABLET ORAL DAILY
Qty: 30 TAB | Refills: 2 | Status: SHIPPED | OUTPATIENT
Start: 2020-02-12 | End: 2020-06-23

## 2020-02-12 NOTE — PROGRESS NOTES
Ron Lebron is a 80 y.o. male who presents today for Cough and Sore Throat  . He has a history of   Patient Active Problem List   Diagnosis Code    OAB (overactive bladder) N32.81    Hyperlipidemia E78.5    History of melanoma Z85.820    Gastroesophageal reflux disease K21.9    Murmur R01.1    Essential tremor G25.0    Aortic valve stenosis I35.0    Coronary artery disease involving native heart without angina pectoris, unspecified vessel or lesion type I25.10    CAD (coronary artery disease) I25.10    CAD (coronary atherosclerotic disease) I25.10    AS (aortic stenosis) I35.0    S/P TAVR (transcatheter aortic valve replacement) Z95.2   . Today patient is here for an acute visit. Ken Mike Cough/drainage: Patient notes ongoing upper respiratory symptoms and sore throat. Symptoms seem to be worse in the afternoon. We have tried ipratropium nasal spray which seems to not have made a difference. On a PPI and notes that this is stable. He has not seen an ENT doctor recently for this. He notes that his cough and drainage all started after a pneumonia just over a year ago. Afib: Patient with a new diagnosis of paroxysmal A. fib. He was seen in the ER after being sent there from cardiac rehab due to A. fib with RVR. He has since been placed on Eliquis and a beta-blocker. He has worn a monitor and notes that he has worn a 7-day monitor but has not gotten the results back yet. CAD/ s/p TAVR: Patient continues cardiac rehab. He notes that overall he is feeling much better. He notes that his energy level is stable. .     Tremor: Patient placed on primidone and is doing well. We increased his dose at last visit. He notes that his essential tremor has been better since being on the beta-blocker as well. Previously he had had side effects with propranolol. ROS  Review of Systems   Constitutional: Negative for chills, fever and weight loss. HENT: Positive for congestion and sore throat. Eyes: Negative for blurred vision, double vision and photophobia. Respiratory: Positive for cough. Negative for shortness of breath. Cardiovascular: Negative for chest pain, palpitations and leg swelling. Gastrointestinal: Negative for abdominal pain, constipation, diarrhea, heartburn, nausea and vomiting. Genitourinary: Negative for dysuria, frequency and urgency. Musculoskeletal: Negative for joint pain and myalgias. Skin: Negative for itching and rash. Neurological: Positive for tremors. Negative for dizziness, tingling, sensory change, focal weakness, seizures, weakness and headaches. Endo/Heme/Allergies: Does not bruise/bleed easily. Psychiatric/Behavioral: Negative for memory loss and suicidal ideas. Visit Vitals  /72 (BP 1 Location: Left arm, BP Patient Position: Sitting)   Pulse (!) 58   Temp 98 °F (36.7 °C) (Oral)   Resp 16   Ht 5' 8\" (1.727 m)   Wt 178 lb (80.7 kg)   SpO2 93%   BMI 27.06 kg/m²       Physical Exam  Constitutional:       Appearance: Normal appearance. He is well-developed. He is not diaphoretic. HENT:      Head: Normocephalic and atraumatic. Right Ear: Tympanic membrane normal.      Left Ear: Tympanic membrane normal.      Nose: Nose normal.   Eyes:      Pupils: Pupils are equal, round, and reactive to light. Cardiovascular:      Rate and Rhythm: Normal rate and regular rhythm. Heart sounds: No murmur. Pulmonary:      Effort: Pulmonary effort is normal. No respiratory distress. Breath sounds: Normal breath sounds. Abdominal:      General: Abdomen is flat. Palpations: Abdomen is soft. Musculoskeletal: Normal range of motion. Skin:     General: Skin is warm and dry. Neurological:      Mental Status: He is alert and oriented to person, place, and time.       Comments: Mild essential tremor which is better than previous   Psychiatric:         Behavior: Behavior normal.           Current Outpatient Medications   Medication Sig    famotidine (PEPCID) 20 mg tablet Take 20 mg by mouth daily.  omeprazole (PRILOSEC) 20 mg capsule Take 1 Cap by mouth daily.  apixaban (ELIQUIS) 5 mg tablet Take 1 Tab by mouth two (2) times a day.  metoprolol tartrate (LOPRESSOR) 25 mg tablet Take 1 Tab by mouth two (2) times a day.  primidone (MYSOLINE) 250 mg tablet Take 1 Tab by mouth nightly.  ipratropium (ATROVENT) 0.03 % nasal spray 2 Sprays by Both Nostrils route every twelve (12) hours.  bacitracin zinc (BACITRACIN) ointment Apply  to affected area two (2) times a day.  acetaminophen (TYLENOL) 325 mg tablet Take 2 Tabs by mouth every four (4) hours as needed for Pain or Fever.  magnesium oxide (MAG-OX) 400 mg tablet Take 400 mg by mouth daily.  clopidogrel (PLAVIX) 75 mg tab Take 1 Tab by mouth daily for 360 days.  atorvastatin (LIPITOR) 20 mg tablet Take 2 Tabs by mouth daily for 360 days.  DETROL LA 4 mg ER capsule Take 1 Cap by mouth daily.  NIASPAN 500 mg tablet daily.  Vit C-Vit E-Copper-ZnOx-Lutein (PRESERVISION LUTEIN) 226 mg-200 unit -5 mg-0.8 mg cap Take  by mouth.  COQ10, UBIQUINOL, PO Take  by mouth.  cholecalciferol (VITAMIN D3) 1,000 unit cap Take  by mouth daily.  cyanocobalamin 1,000 mcg tablet Take 1,000 mcg by mouth daily.  folic acid (FOLVITE) 1 mg tablet Take  by mouth daily. No current facility-administered medications for this visit. Past Medical History:   Diagnosis Date    GERD (gastroesophageal reflux disease)     Hyperactivity of bladder     Hypercholesterolemia     Macular degeneration disease     Skin cancer (melanoma) (HCC)     Tremor       Past Surgical History:   Procedure Laterality Date    HX HERNIA REPAIR      HX MOHS PROCEDURES        Social History     Tobacco Use    Smoking status: Never Smoker    Smokeless tobacco: Never Used   Substance Use Topics    Alcohol use:  Yes     Alcohol/week: 5.0 standard drinks     Types: 5 Glasses of wine per week Comment: beer or glass of wine nightly       Family History   Problem Relation Age of Onset    Cancer Father     Lung Cancer Father         smoker    Heart Disease Brother     Diabetes Brother     Diabetes Maternal Grandfather     Prostate Cancer Brother         Allergies   Allergen Reactions    Penicillins Hives        Assessment/Plan  Diagnoses and all orders for this visit:    1. Upper airway cough syndrome-it appears that his GERD is under good control. Patient has no history of asthma. Notes that the ipratropium nasal spray did not help. He believes that he is tried antihistamines without much success. We will try him on Singulair to see if this helps with his chronic throat clearing and otherwise have him seen by ENT. -     montelukast (SINGULAIR) 10 mg tablet; Take 1 Tab by mouth daily.  -     REFERRAL TO ENT-OTOLARYNGOLOGY    2. Essential tremor-improved more with beta-blocker. Denies any side effects that he had previously had    3. Coronary artery disease involving native coronary artery of native heart without angina pectoris-status post stent placement. Patient doing well. He is in cardiac rehab    4. Gastroesophageal reflux disease, esophagitis presence not specified-not having any heartburn symptoms    5. Hyperlipidemia, unspecified hyperlipidemia type    6. PAF (paroxysmal atrial fibrillation) ([facility])-currently being evaluated by cardiology. He remains on beta-blocker and Jinny Polanco MD  2/12/2020    This note was created with the help of speech recognition software Helene Moralez) and may contain some 'sound alike' errors.

## 2020-02-14 ENCOUNTER — HOSPITAL ENCOUNTER (OUTPATIENT)
Dept: CARDIAC REHAB | Age: 85
Discharge: HOME | End: 2020-02-14
Payer: MEDICARE

## 2020-02-14 VITALS — BODY MASS INDEX: 26.46 KG/M2 | WEIGHT: 174 LBS

## 2020-02-14 PROCEDURE — 93798 PHYS/QHP OP CAR RHAB W/ECG: CPT

## 2020-02-19 ENCOUNTER — HOSPITAL ENCOUNTER (OUTPATIENT)
Dept: CARDIAC REHAB | Age: 85
Discharge: HOME | End: 2020-02-19
Payer: MEDICARE

## 2020-02-19 VITALS — WEIGHT: 174 LBS | BODY MASS INDEX: 26.46 KG/M2

## 2020-02-19 PROCEDURE — 93798 PHYS/QHP OP CAR RHAB W/ECG: CPT

## 2020-02-21 ENCOUNTER — HOSPITAL ENCOUNTER (OUTPATIENT)
Dept: CARDIAC REHAB | Age: 85
Discharge: HOME | End: 2020-02-21
Payer: MEDICARE

## 2020-02-21 VITALS — WEIGHT: 173 LBS | BODY MASS INDEX: 26.3 KG/M2

## 2020-02-21 PROCEDURE — 93798 PHYS/QHP OP CAR RHAB W/ECG: CPT

## 2020-02-25 ENCOUNTER — HOSPITAL ENCOUNTER (OUTPATIENT)
Dept: CARDIAC REHAB | Age: 85
Discharge: HOME | End: 2020-02-25
Payer: MEDICARE

## 2020-02-25 VITALS — BODY MASS INDEX: 26.3 KG/M2 | WEIGHT: 173 LBS

## 2020-02-25 PROCEDURE — 93798 PHYS/QHP OP CAR RHAB W/ECG: CPT

## 2020-02-28 ENCOUNTER — HOSPITAL ENCOUNTER (OUTPATIENT)
Dept: CARDIAC REHAB | Age: 85
Discharge: HOME | End: 2020-02-28
Payer: MEDICARE

## 2020-02-28 VITALS — BODY MASS INDEX: 26.46 KG/M2 | WEIGHT: 174 LBS

## 2020-02-28 PROCEDURE — 93798 PHYS/QHP OP CAR RHAB W/ECG: CPT

## 2020-03-03 ENCOUNTER — HOSPITAL ENCOUNTER (OUTPATIENT)
Dept: CARDIAC REHAB | Age: 85
Discharge: HOME | End: 2020-03-03
Payer: MEDICARE

## 2020-03-03 VITALS — WEIGHT: 173 LBS | BODY MASS INDEX: 26.3 KG/M2

## 2020-03-03 PROCEDURE — 93798 PHYS/QHP OP CAR RHAB W/ECG: CPT

## 2020-03-06 ENCOUNTER — HOSPITAL ENCOUNTER (OUTPATIENT)
Dept: CARDIAC REHAB | Age: 85
Discharge: HOME | End: 2020-03-06
Payer: MEDICARE

## 2020-03-06 VITALS — BODY MASS INDEX: 26.61 KG/M2 | WEIGHT: 175 LBS

## 2020-03-06 PROCEDURE — 93798 PHYS/QHP OP CAR RHAB W/ECG: CPT

## 2020-03-10 ENCOUNTER — HOSPITAL ENCOUNTER (OUTPATIENT)
Dept: LAB | Age: 85
Discharge: HOME | End: 2020-03-10
Payer: MEDICARE

## 2020-03-10 ENCOUNTER — HOSPITAL ENCOUNTER (OUTPATIENT)
Dept: CARDIAC REHAB | Age: 85
Discharge: HOME | End: 2020-03-10
Payer: MEDICARE

## 2020-03-10 VITALS — WEIGHT: 174 LBS | BODY MASS INDEX: 26.46 KG/M2

## 2020-03-10 PROCEDURE — 85025 COMPLETE CBC W/AUTO DIFF WBC: CPT

## 2020-03-10 PROCEDURE — 93798 PHYS/QHP OP CAR RHAB W/ECG: CPT

## 2020-03-10 PROCEDURE — 80061 LIPID PANEL: CPT

## 2020-03-10 PROCEDURE — 80053 COMPREHEN METABOLIC PANEL: CPT

## 2020-03-10 PROCEDURE — 36415 COLL VENOUS BLD VENIPUNCTURE: CPT

## 2020-03-11 LAB
ALBUMIN SERPL-MCNC: 3.9 G/DL (ref 3.6–4.6)
ALBUMIN/GLOB SERPL: 1.6 {RATIO} (ref 1.2–2.2)
ALP SERPL-CCNC: 88 IU/L (ref 39–117)
ALT SERPL-CCNC: 19 IU/L (ref 0–44)
AST SERPL-CCNC: 22 IU/L (ref 0–40)
BASOPHILS # BLD AUTO: 0.1 X10E3/UL (ref 0–0.2)
BASOPHILS NFR BLD AUTO: 1 %
BILIRUB SERPL-MCNC: 0.5 MG/DL (ref 0–1.2)
BUN SERPL-MCNC: 13 MG/DL (ref 8–27)
BUN/CREAT SERPL: 16 (ref 10–24)
CALCIUM SERPL-MCNC: 9.3 MG/DL (ref 8.6–10.2)
CHLORIDE SERPL-SCNC: 102 MMOL/L (ref 96–106)
CHOLEST SERPL-MCNC: 123 MG/DL (ref 100–199)
CO2 SERPL-SCNC: 25 MMOL/L (ref 20–29)
CREAT SERPL-MCNC: 0.81 MG/DL (ref 0.76–1.27)
EOSINOPHIL # BLD AUTO: 0.2 X10E3/UL (ref 0–0.4)
EOSINOPHIL NFR BLD AUTO: 5 %
ERYTHROCYTE [DISTWIDTH] IN BLOOD BY AUTOMATED COUNT: 12.1 % (ref 11.6–15.4)
GLOBULIN SER CALC-MCNC: 2.5 G/DL (ref 1.5–4.5)
GLUCOSE SERPL-MCNC: 81 MG/DL (ref 65–99)
HCT VFR BLD AUTO: 43.5 % (ref 37.5–51)
HDLC SERPL-MCNC: 38 MG/DL
HGB BLD-MCNC: 14.5 G/DL (ref 13–17.7)
IMM GRANULOCYTES # BLD AUTO: 0 X10E3/UL (ref 0–0.1)
IMM GRANULOCYTES NFR BLD AUTO: 0 %
INTERPRETATION, 910389: NORMAL
LDLC SERPL CALC-MCNC: 75 MG/DL (ref 0–99)
LYMPHOCYTES # BLD AUTO: 1 X10E3/UL (ref 0.7–3.1)
LYMPHOCYTES NFR BLD AUTO: 20 %
MCH RBC QN AUTO: 29.7 PG (ref 26.6–33)
MCHC RBC AUTO-ENTMCNC: 33.3 G/DL (ref 31.5–35.7)
MCV RBC AUTO: 89 FL (ref 79–97)
MONOCYTES # BLD AUTO: 0.6 X10E3/UL (ref 0.1–0.9)
MONOCYTES NFR BLD AUTO: 11 %
NEUTROPHILS # BLD AUTO: 3.2 X10E3/UL (ref 1.4–7)
NEUTROPHILS NFR BLD AUTO: 63 %
PLATELET # BLD AUTO: 114 X10E3/UL (ref 150–450)
POTASSIUM SERPL-SCNC: 4.2 MMOL/L (ref 3.5–5.2)
PROT SERPL-MCNC: 6.4 G/DL (ref 6–8.5)
RBC # BLD AUTO: 4.89 X10E6/UL (ref 4.14–5.8)
SODIUM SERPL-SCNC: 141 MMOL/L (ref 134–144)
TRIGL SERPL-MCNC: 50 MG/DL (ref 0–149)
VLDLC SERPL CALC-MCNC: 10 MG/DL (ref 5–40)
WBC # BLD AUTO: 5.1 X10E3/UL (ref 3.4–10.8)

## 2020-03-13 ENCOUNTER — HOSPITAL ENCOUNTER (OUTPATIENT)
Dept: CARDIAC REHAB | Age: 85
Discharge: HOME | End: 2020-03-13
Payer: MEDICARE

## 2020-03-13 VITALS — BODY MASS INDEX: 26.61 KG/M2 | WEIGHT: 175 LBS

## 2020-03-13 PROCEDURE — 93798 PHYS/QHP OP CAR RHAB W/ECG: CPT

## 2020-03-17 ENCOUNTER — HOSPITAL ENCOUNTER (OUTPATIENT)
Dept: CARDIAC REHAB | Age: 85
Discharge: HOME | End: 2020-03-17
Payer: MEDICARE

## 2020-03-17 VITALS — WEIGHT: 175 LBS | BODY MASS INDEX: 26.61 KG/M2

## 2020-03-17 PROCEDURE — 93798 PHYS/QHP OP CAR RHAB W/ECG: CPT

## 2020-03-19 ENCOUNTER — HOSPITAL ENCOUNTER (OUTPATIENT)
Dept: CARDIAC REHAB | Age: 85
Discharge: HOME | End: 2020-03-19
Payer: MEDICARE

## 2020-03-19 VITALS — WEIGHT: 174 LBS | BODY MASS INDEX: 26.46 KG/M2

## 2020-03-19 PROCEDURE — 93798 PHYS/QHP OP CAR RHAB W/ECG: CPT

## 2020-03-20 ENCOUNTER — TELEPHONE (OUTPATIENT)
Dept: CARDIOLOGY CLINIC | Age: 85
End: 2020-03-20

## 2020-03-20 NOTE — TELEPHONE ENCOUNTER
Called patient about rescheduling appt for next week patient verbalized understanding doing well at this time. Patient although would like to know if and how long he needs to continue taking the Plavix 75 mg. Please advise.

## 2020-03-20 NOTE — TELEPHONE ENCOUNTER
Called patient reviewed below message per NP BODØ     To stay on the Plavix for at least 6 months s/p PCI 12/4/19.       I will reach out to Dr. Echo Cnon given TAVR but we will cont till June at minimum.      Patient verbalized understanding.

## 2020-03-20 NOTE — TELEPHONE ENCOUNTER
Patient is calling to speak with you again regarding the conversation you had with him this morning. Please advise.     Phone: [phone]

## 2020-03-23 ENCOUNTER — DOCUMENTATION ONLY (OUTPATIENT)
Dept: CARDIOLOGY CLINIC | Age: 85
End: 2020-03-23

## 2020-03-23 NOTE — PROGRESS NOTES
Event monitor-7-day 1/24/2020to  1/30/2020-0 critical, 0 serious and 2 stable events. Sinus bradycardia 1/27/2020-2:02 PM 38 bpm.-Auto detected. Dino Garay MD, [facility] [place]

## 2020-04-01 NOTE — CARDIO/PULMONARY
Spoke with  Al on 4/1/2020 for follow up Cardiopulmonary telephone counseling    Psychosocial: Patient reports having minimal stressors at this time. Discussed stress management technique such as deep breathing and walking. Smoking:  Patient does not have current or recent tobacco use. Exercise:  Discussed patient's currents home exercise routine. He reports that they are  Suggested exercise options such as continue walking at home and some stretches. Reported barriers to exercise at this time include isolation to home but can walk and stretch. Plans to stay active during extended stay at home include walking and stretches. Nutrition: Heart healthy as possible, with food being brought in from family. Betsy Galeas RN

## 2020-04-07 RX ORDER — TOLTERODINE TARTRATE 4 MG/1
CAPSULE, EXTENDED RELEASE ORAL
Qty: 90 CAP | Refills: 3 | Status: SHIPPED | OUTPATIENT
Start: 2020-04-07 | End: 2021-04-30

## 2020-04-17 ENCOUNTER — TELEPHONE (OUTPATIENT)
Dept: CARDIOLOGY CLINIC | Age: 85
End: 2020-04-17

## 2020-04-30 ENCOUNTER — VIRTUAL VISIT (OUTPATIENT)
Dept: CARDIOLOGY CLINIC | Age: 85
End: 2020-04-30

## 2020-04-30 DIAGNOSIS — I25.10 CORONARY ARTERY DISEASE INVOLVING NATIVE CORONARY ARTERY OF NATIVE HEART WITHOUT ANGINA PECTORIS: Primary | ICD-10-CM

## 2020-04-30 DIAGNOSIS — I48.91 ATRIAL FIBRILLATION, UNSPECIFIED TYPE (HCC): ICD-10-CM

## 2020-04-30 DIAGNOSIS — E78.5 HYPERLIPIDEMIA, UNSPECIFIED HYPERLIPIDEMIA TYPE: ICD-10-CM

## 2020-04-30 DIAGNOSIS — Z95.2 S/P TAVR (TRANSCATHETER AORTIC VALVE REPLACEMENT): ICD-10-CM

## 2020-04-30 NOTE — PROGRESS NOTES
Lulu Solomon MD    [address] [place], [address] [facility]    Office [phone], [phone] to the emergency declaration under the [facility] waiver authority and the [employer] Act, this Telephone Visit was conducted, with patient's consent, to reduce the patient's risk of exposure to COVID-19 and provide continuity of care for an established patient. He and/or health care decision maker is aware that that he may receive a bill for this telephone service, depending on his insurance coverage, and has provided verbal consent to proceed. This is a Patient Initiated Episode with an Established Patient who has not had a related appointment within my department in the past 7 days or scheduled within the next 24 hours. Primary care physician:  Catarina Acosta MD    Patient Active Problem List   Diagnosis Code    OAB (overactive bladder) N32.81    Hyperlipidemia E78.5    History of melanoma Z85.820    Gastroesophageal reflux disease K21.9    Murmur R01.1    Essential tremor G25.0    Aortic valve stenosis I35.0    Coronary artery disease involving native heart without angina pectoris, unspecified vessel or lesion type I25.10    CAD (coronary artery disease) I25.10    CAD (coronary atherosclerotic disease) I25.10    AS (aortic stenosis) I35.0    S/P TAVR (transcatheter aortic valve replacement) Z95.2    PAF (paroxysmal atrial fibrillation) ([facility] [address] I48.0           Chief complaint:  No chief complaint on file. ssessment:  Severe AS- S/p TAVR  CAD - s/p LAD stent  HLD  Hx of GERD      Plan:   . Doing well from a cardiac standpoint. He will stop Plavix first week of June-6 months from his stent. Also on Eliquis. Echocardiogram on follow-up visit/follow-up in 3 months or earlier as needed.   Aggressive cardiovascular risk factor modification. Patient understands the plan. All questions were answered to the patient's satisfaction. Patient was made aware and verbalized understanding that an appointment will be scheduled for them for a tel/virtual visit and/or office visit within the above time frame. Patient understanding his/her responsibility to call and change time/date if he/she so chooses. .     Greater than 20 minutes was spent in direct tel patient care, planning and chart review. Medication Side Effects and Warnings were discussed with patient: yes  Patient Labs were reviewed and or requested:  yes  Patient Past Records were reviewed and or requested: yes    I appreciate the opportunity to be involved in . Please see note below for details. Please do not hesitate to contact us with questions or concerns. Obi Hummel MD    Cardiac Testing/ Procedures: A. Cardiac Cath/PCI:12/4/19 ( Dr Andrew Cotto) Findings:  1)Severe mid LAD calcified disease  2)S/p PCI with 3 by 38 mm Xience EFFIE placement following Rotational atherectomy with 1.75 mm [place]. Post dilatation with 3.25 mm Bballoon at 20-24 KYRA. Good final result. Moderate distal disease deferred.     Access: Right radial--no issues     11/6/19 R Brachial vein access - 7 F sheath  R Radial access - 6 F sheath     Calcified coronaries  RCA - 3DRC; LCA - JL4  Codominant     L Main: Large; MLI     LAD: Prox - large; Mid/Distal Med; Ostial 30%; Mid - Ca+++; 80% ( haziness prob sec to Ca+); Another mid lesion - 70% ; Small to med D1- Prox 30%;  Small  D2; Large tortuous septal     LCflex: Med to large; MLI; OM1 - Med to large; Prox 50%     RCA: Med to large; Prox/Mid - MLI; Distal at bifurcation 60-70%- small PDA/PLB     LVEDP: valve not crossed     LVEF: not assessed     No significant gradient across aortic valve.           RHC findings:     RAPm= 4      mmHg  RVSP= 19    mmHg  PAPm= 14       mmHg  PCWPm=7    mmHg  CO= 6.02       l/min  CI=3.08     Specimens Removed : None     Complications: None     Closure Device: TR    · B.ECHO/SHARAN: 1/14/20 Normal cavity size and systolic function (ejection fraction normal). Mild concentric hypertrophy. Calculated left ventricular ejection fraction is 63%. Biplane method used to measure ejection fraction. No regional wall motion abnormality noted. Mild (grade 1) left ventricular diastolic dysfunction. · Prosthetic aortic valve. There is a 26 mm Watson KIM prosthetic aortic valve from prior TAVR procedure. Prosthesis is normal. Trace paravalvular leak is present. Gradients are appropriate for this valve. Mitral valve thickening. Mild mitral annular calcification. Mild mitral valve regurgitation is present. 10/23/19 Left Ventricle: Normal cavity size and systolic function (ejection fraction normal). Mildly increased wall thickness. Estimated left ventricular ejection fraction is 56 - 60%. No regional wall motion abnormality noted. Mild (grade 1) left ventricular diastolic dysfunction. · Mitral Valve: Mitral annular calcification. Mild mitral valve regurgitation is present. · Left Atrium: Dilated left atrium. · Tricuspid Valve: Mild tricuspid valve regurgitation is present. · Aortic Valve: Severe aortic valve sclerosis with reduced excursion. Aortic valve leaflet calcification present with reduced excursion. Severe aortic valve stenosis is present. Mild to moderate aortic valve regurgitation is present. Aortic Valve Systolic Mean Gradient 56 mm Hg; AV peak gradient ( pedhoff) -74 mm Hg    C.StressNuclear/Stress ECHO/Stress test:    D.Vascular: 11/5/19 Carotid doppler 1-59% bilat ICA    E. EP: Event monitor-7-day 1/24/2020to  1/30/2020-0 critical, 0 serious and 2 stable events. Sinus bradycardia 1/27/2020-2:02 PM 38 bpm.-Auto detected. F. Miscellaneous: 12/20/19 1. Angiography of the ascending aorta and aortoiliac bifurcation  2. Temporary transvenous pacemaker insertion  3.  Left heart catheterization  4. Implantation of a 26 mm Watson S3 transcatheter aortic valve via the right transfemoral approach  5. 14F right?femoral artery access with Perclose pre-closure / 6F left femoral vein access with manual compression / 6F left femoral artery access with Perclose closure    Subjective:    Patient states that he has been doing well. He is walking in his neighborhood. No chest pain/edema/palpitations/syncope. Mild dyspnea on exertion when walking uphill. Resolved spontaneously on resting. ROS:  (bold if positive, if negative)           Medications before admission:    Current Outpatient Medications   Medication Sig Dispense    Detrol LA 4 mg ER capsule TAKE 1 CAPSULE DAILY 90 Cap    omeprazole (PRILOSEC) 20 mg capsule Take 1 Cap by mouth daily. 90 Cap    apixaban (ELIQUIS) 5 mg tablet Take 1 Tab by mouth two (2) times a day. 180 Tab    metoprolol tartrate (LOPRESSOR) 25 mg tablet Take 1 Tab by mouth two (2) times a day. 180 Tab    primidone (MYSOLINE) 250 mg tablet Take 1 Tab by mouth nightly. 90 Tab    ipratropium (ATROVENT) 0.03 % nasal spray 2 Sprays by Both Nostrils route every twelve (12) hours. 30 mL    acetaminophen (TYLENOL) 325 mg tablet Take 2 Tabs by mouth every four (4) hours as needed for Pain or Fever. 60 Tab    magnesium oxide (MAG-OX) 400 mg tablet Take 400 mg by mouth daily.  clopidogrel (PLAVIX) 75 mg tab Take 1 Tab by mouth daily for 360 days. 90 Tab    atorvastatin (LIPITOR) 20 mg tablet Take 2 Tabs by mouth daily for 360 days. 180 Tab    NIASPAN 500 mg tablet daily.  Vit C-Vit E-Copper-ZnOx-Lutein (PRESERVISION LUTEIN) 226 mg-200 unit -5 mg-0.8 mg cap Take  by mouth.  COQ10, UBIQUINOL, PO Take  by mouth.  cholecalciferol (VITAMIN D3) 1,000 unit cap Take  by mouth daily.  cyanocobalamin 1,000 mcg tablet Take 1,000 mcg by mouth daily.  folic acid (FOLVITE) 1 mg tablet Take  by mouth daily.      famotidine (PEPCID) 20 mg tablet Take 20 mg by mouth daily.  montelukast (SINGULAIR) 10 mg tablet Take 1 Tab by mouth daily. 30 Tab    bacitracin zinc (BACITRACIN) ointment Apply  to affected area two (2) times a day. (Patient not taking: Reported on 4/30/2020) 15 g     No current facility-administered medications for this visit. Family History of CAD:    Yes    Social History:  Current  Smoker No    Physical Exam:  There were no vitals taken for this visit. EKG: LABS:        Lab Results   Component Value Date/Time    WBC 5.1 03/10/2020 09:05 AM    HGB 14.5 03/10/2020 09:05 AM    HCT 43.5 03/10/2020 09:05 AM    PLATELET 870 (L) 42/66/1384 09:05 AM     Lab Results   Component Value Date/Time    Sodium 141 03/10/2020 09:05 AM    Potassium 4.2 03/10/2020 09:05 AM    Chloride 102 03/10/2020 09:05 AM    CO2 25 03/10/2020 09:05 AM    Anion gap 3 (L) 01/22/2020 02:04 PM    Glucose 81 03/10/2020 09:05 AM    BUN 13 03/10/2020 09:05 AM    Creatinine 0.81 03/10/2020 09:05 AM    BUN/Creatinine ratio 16 03/10/2020 09:05 AM    GFR est AA 94 03/10/2020 09:05 AM    GFR est non-AA 81 03/10/2020 09:05 AM    Calcium 9.3 03/10/2020 09:05 AM       Lab Results   Component Value Date/Time    aPTT 30.7 12/20/2019 10:59 AM     Lab Results   Component Value Date/Time    INR 1.2 (H) 01/22/2020 02:04 PM    INR 1.2 (H) 12/20/2019 10:59 AM    INR 1.2 (H) 12/17/2019 10:15 AM    Prothrombin time 11.9 (H) 01/22/2020 02:04 PM    Prothrombin time 12.2 (H) 12/20/2019 10:59 AM    Prothrombin time 11.7 (H) 12/17/2019 10:15 AM     No components found for: LAST LIPIDS    ATTENTION:   This medical record was transcribed using an electronic medical records/speech recognition system. Although proofread, it may and can contain electronic, spelling and other errors. Corrections may be executed at a later time. Please feel free to contact us for any clarifications as needed.       Glo Collazo MD

## 2020-06-15 ENCOUNTER — HOSPITAL ENCOUNTER (OUTPATIENT)
Dept: CARDIAC REHAB | Age: 85
Discharge: HOME | End: 2020-06-15
Payer: MEDICARE

## 2020-06-15 VITALS — BODY MASS INDEX: 26.91 KG/M2 | WEIGHT: 177 LBS

## 2020-06-15 PROCEDURE — 93798 PHYS/QHP OP CAR RHAB W/ECG: CPT

## 2020-06-18 ENCOUNTER — HOSPITAL ENCOUNTER (OUTPATIENT)
Dept: CARDIAC REHAB | Age: 85
Discharge: HOME | End: 2020-06-18
Payer: MEDICARE

## 2020-06-18 VITALS — WEIGHT: 176 LBS | BODY MASS INDEX: 26.76 KG/M2

## 2020-06-18 PROCEDURE — 93798 PHYS/QHP OP CAR RHAB W/ECG: CPT

## 2020-06-22 ENCOUNTER — HOSPITAL ENCOUNTER (OUTPATIENT)
Dept: CARDIAC REHAB | Age: 85
Discharge: HOME | End: 2020-06-22
Payer: MEDICARE

## 2020-06-22 VITALS — BODY MASS INDEX: 26.76 KG/M2 | WEIGHT: 176 LBS

## 2020-06-22 PROCEDURE — 93798 PHYS/QHP OP CAR RHAB W/ECG: CPT

## 2020-06-23 ENCOUNTER — OFFICE VISIT (OUTPATIENT)
Dept: INTERNAL MEDICINE CLINIC | Age: 85
End: 2020-06-23

## 2020-06-23 ENCOUNTER — TELEPHONE (OUTPATIENT)
Dept: CARDIOLOGY CLINIC | Age: 85
End: 2020-06-23

## 2020-06-23 VITALS
RESPIRATION RATE: 16 BRPM | HEIGHT: 68 IN | SYSTOLIC BLOOD PRESSURE: 102 MMHG | DIASTOLIC BLOOD PRESSURE: 64 MMHG | BODY MASS INDEX: 26.37 KG/M2 | TEMPERATURE: 97.9 F | HEART RATE: 41 BPM | OXYGEN SATURATION: 96 % | WEIGHT: 174 LBS

## 2020-06-23 DIAGNOSIS — I48.0 PAF (PAROXYSMAL ATRIAL FIBRILLATION) (HCC): ICD-10-CM

## 2020-06-23 DIAGNOSIS — G25.0 ESSENTIAL TREMOR: ICD-10-CM

## 2020-06-23 DIAGNOSIS — E78.5 HYPERLIPIDEMIA, UNSPECIFIED HYPERLIPIDEMIA TYPE: ICD-10-CM

## 2020-06-23 DIAGNOSIS — Z71.89 ADVANCED DIRECTIVES, COUNSELING/DISCUSSION: ICD-10-CM

## 2020-06-23 DIAGNOSIS — K21.9 GASTROESOPHAGEAL REFLUX DISEASE WITHOUT ESOPHAGITIS: ICD-10-CM

## 2020-06-23 DIAGNOSIS — Z23 ENCOUNTER FOR IMMUNIZATION: ICD-10-CM

## 2020-06-23 DIAGNOSIS — Z00.00 MEDICARE ANNUAL WELLNESS VISIT, SUBSEQUENT: Primary | ICD-10-CM

## 2020-06-23 DIAGNOSIS — Z95.2 S/P TAVR (TRANSCATHETER AORTIC VALVE REPLACEMENT): ICD-10-CM

## 2020-06-23 DIAGNOSIS — I25.10 CORONARY ARTERY DISEASE INVOLVING NATIVE CORONARY ARTERY OF NATIVE HEART WITHOUT ANGINA PECTORIS: ICD-10-CM

## 2020-06-23 RX ORDER — ATORVASTATIN CALCIUM 40 MG/1
40 TABLET, FILM COATED ORAL DAILY
Qty: 90 TAB | Refills: 3 | Status: SHIPPED | OUTPATIENT
Start: 2020-06-23 | End: 2021-06-01

## 2020-06-23 RX ORDER — OMEPRAZOLE 20 MG/1
20 CAPSULE, DELAYED RELEASE ORAL DAILY
Qty: 90 CAP | Refills: 1 | Status: SHIPPED | OUTPATIENT
Start: 2020-06-23 | End: 2021-02-07

## 2020-06-23 RX ORDER — ATORVASTATIN CALCIUM 40 MG/1
40 TABLET, FILM COATED ORAL DAILY
Qty: 90 TAB | Refills: 3 | Status: SHIPPED | OUTPATIENT
Start: 2020-06-23 | End: 2020-06-23 | Stop reason: SDUPTHER

## 2020-06-23 NOTE — ACP (ADVANCE CARE PLANNING)
Advance Care Planning       Advance Care Planning (ACP) Physician/NP/PA (Provider) Conversation        Date of ACP Conversation: 6/23/2020    Conversation Conducted with:   Patient with Decision Making Capacity    Conversation Outcomes / Follow-Up Plan:   He will provide us with his advance care plan.       Length of Voluntary ACP Conversation in minutes:  <16 minutes (Non-Billable)      Lovely Lundborg, MD

## 2020-06-23 NOTE — PROGRESS NOTES
Alec Bright is a 80 y.o. male who presents today for Annual Wellness Visit  . He has a history of   Patient Active Problem List   Diagnosis Code    OAB (overactive bladder) N32.81    Hyperlipidemia E78.5    History of melanoma Z85.820    Gastroesophageal reflux disease K21.9    Murmur R01.1    Essential tremor G25.0    Aortic valve stenosis I35.0    Coronary artery disease involving native heart without angina pectoris, unspecified vessel or lesion type I25.10    CAD (coronary artery disease) I25.10    CAD (coronary atherosclerotic disease) I25.10    AS (aortic stenosis) I35.0    S/P TAVR (transcatheter aortic valve replacement) Z95.2    PAF (paroxysmal atrial fibrillation) ([facility]) I48.0   . Today patient is here for his Medicare wellness exam.    Continues to have some mild postnasal drip. He is going to be returning to see ENT for this. CAD/ s/p TAVR/ Afib: Patient did have a stent placed in late 2019 followed by TAVR also in December. He is now off Plavix and ASA but on Eliquis since developing an episode of A. fib. I discussed with him that more than likely he needs to be on aspirin. He will reach out to cardiology office. He remains in cardiac rehab. Overall he notes that he is feeling ok. Some issues with warm weather and mild shortness of breath. Today he is quite bradycardic in the office but notes that at home this is stable. Denies any dizziness upon standing. Hyperlipidemia  Now on 40mg atorvastatin. Lab Results   Component Value Date/Time    Cholesterol, total 123 03/10/2020 09:05 AM    HDL Cholesterol 38 (L) 03/10/2020 09:05 AM    LDL, calculated 75 03/10/2020 09:05 AM    VLDL, calculated 10 03/10/2020 09:05 AM    Triglyceride 50 03/10/2020 09:05 AM     Essential tremor: Patient has been placed on primidone by myself due to side effects with beta-blockers in the past.  Since he has been placed on a beta-blocker again due to his episode of A. fib.   He notes that overall his tremor is much better. Reflux is stable. Health maintenance hx includes:  Exercise: moderately active. Form of exercise: walking regularly. Social: Patient recently moved here from [place]. He lives at home with his wife. From [place] originally. Denies any smoking history. He is independent of all activities of daily living. He is retired [employer] through the [employer]. Two sons. 2 grandchildren, one great-grandchild  Social alcohol. No significant smoking history. Cancer screening:    Colon cancer screening: N/A   Prostate cancer screening: N/A    Immunizations:     Immunization History   Administered Date(s) Administered    Influenza High Dose Vaccine PF 10/15/2015, 10/19/2016, 11/15/2017, 09/25/2018    Influenza Vaccine 09/30/2010, 10/04/2011, 11/02/2012, 10/10/2013, 10/03/2014    Influenza Vaccine (Tri) Adjuvanted 10/16/2019    Pneumococcal Conjugate (PCV-13) 05/26/2010, 04/06/2015    Pneumococcal Polysaccharide (PPSV-23) 02/01/2010, 04/06/2015    Zoster Vaccine, Live 01/28/2014, 05/10/2017      Immunization status: up to date and documented, Tdap today, discussed shingles vaccine    ROS  Review of Systems   Constitutional: Negative for chills, fever and weight loss. HENT: Negative for congestion and sore throat. Eyes: Negative for blurred vision, double vision and photophobia. Respiratory: Positive for shortness of breath (Mild and stable. ). Negative for cough. Cardiovascular: Negative for chest pain, palpitations and leg swelling. Gastrointestinal: Negative for abdominal pain, constipation, diarrhea, heartburn, nausea and vomiting. Genitourinary: Negative for dysuria, frequency and urgency. Musculoskeletal: Negative for joint pain and myalgias. Skin: Negative for rash. Neurological: Positive for tremors. Negative for tingling, sensory change, speech change, focal weakness, seizures, weakness and headaches.    Endo/Heme/Allergies: Does not bruise/bleed easily. Psychiatric/Behavioral: Negative for memory loss and suicidal ideas. Visit Vitals  /64 (BP 1 Location: Left arm, BP Patient Position: Sitting)   Pulse (!) 41   Temp 97.9 °F (36.6 °C) (Oral)   Resp 16   Ht 5' 8\" (1.727 m)   Wt 174 lb (78.9 kg)   SpO2 96%   BMI 26.46 kg/m²       Physical Exam  Constitutional:       Appearance: He is well-developed. He is not diaphoretic. HENT:      Head: Normocephalic and atraumatic. Right Ear: Tympanic membrane normal.      Left Ear: Tympanic membrane normal.   Eyes:      Pupils: Pupils are equal, round, and reactive to light. Neck:      Musculoskeletal: Normal range of motion and neck supple. Vascular: No JVD. Cardiovascular:      Rate and Rhythm: Regular rhythm. Bradycardia present. Heart sounds: Murmur (soft) present. Pulmonary:      Effort: Pulmonary effort is normal. No respiratory distress. Breath sounds: Normal breath sounds. No wheezing. Abdominal:      General: Bowel sounds are normal. There is no distension. Palpations: Abdomen is soft. Tenderness: There is no abdominal tenderness. Musculoskeletal: Normal range of motion. Skin:     General: Skin is warm and dry. Neurological:      Mental Status: He is alert and oriented to person, place, and time. Cranial Nerves: No cranial nerve deficit. Comments: Minimal essential tremor. Psychiatric:         Behavior: Behavior normal.           Current Outpatient Medications   Medication Sig    omeprazole (PRILOSEC) 20 mg capsule Take 1 Cap by mouth daily.  atorvastatin (LIPITOR) 40 mg tablet Take 1 Tab by mouth daily for 360 days.  Detrol LA 4 mg ER capsule TAKE 1 CAPSULE DAILY    apixaban (ELIQUIS) 5 mg tablet Take 1 Tab by mouth two (2) times a day.  metoprolol tartrate (LOPRESSOR) 25 mg tablet Take 1 Tab by mouth two (2) times a day.  primidone (MYSOLINE) 250 mg tablet Take 1 Tab by mouth nightly.     ipratropium (ATROVENT) 0.03 % nasal spray 2 Sprays by Both Nostrils route every twelve (12) hours.  acetaminophen (TYLENOL) 325 mg tablet Take 2 Tabs by mouth every four (4) hours as needed for Pain or Fever.  magnesium oxide (MAG-OX) 400 mg tablet Take 400 mg by mouth daily.  NIASPAN 500 mg tablet daily.  Vit C-Vit E-Copper-ZnOx-Lutein (PRESERVISION LUTEIN) 226 mg-200 unit -5 mg-0.8 mg cap Take  by mouth.  COQ10, UBIQUINOL, PO Take  by mouth.  cholecalciferol (VITAMIN D3) 1,000 unit cap Take  by mouth daily.  cyanocobalamin 1,000 mcg tablet Take 1,000 mcg by mouth daily.  folic acid (FOLVITE) 1 mg tablet Take  by mouth daily. No current facility-administered medications for this visit. Past Medical History:   Diagnosis Date    GERD (gastroesophageal reflux disease)     Hyperactivity of bladder     Hypercholesterolemia     Macular degeneration disease     Skin cancer (melanoma) (HCC)     Tremor       Past Surgical History:   Procedure Laterality Date    HX HERNIA REPAIR      HX MOHS PROCEDURES        Social History     Tobacco Use    Smoking status: Never Smoker    Smokeless tobacco: Never Used   Substance Use Topics    Alcohol use: Yes     Alcohol/week: 5.0 standard drinks     Types: 5 Glasses of wine per week     Comment: beer or glass of wine nightly       Family History   Problem Relation Age of Onset    Cancer Father     Lung Cancer Father         smoker    Heart Disease Brother     Diabetes Brother     Diabetes Maternal Grandfather     Prostate Cancer Brother         Allergies   Allergen Reactions    Penicillins Hives        Assessment/Plan  Diagnoses and all orders for this visit:    1. Medicare annual wellness visit, zurdo Arguetalisa was counseled on age-appropriate/ guideline-based risk prevention behaviors and screening for a 80y.o. year old   male . We also discussed adjustments in screening based on family history if necessary.    Printed instructions for preventative screening guidelines and healthy behaviors given to patient with after visit summary. 2. Advanced directives, counseling/discussion    3. Coronary artery disease involving native coronary artery of native heart without angina pectoris-taking 40 mg. We will repeat this in 6 months. I discussed with him my concern that he is off both Plavix and aspirin. He will reach back out to cardiology office. We discussed that he likely needs to remain on permanent aspirin therapy. -     atorvastatin (LIPITOR) 40 mg tablet; Take 1 Tab by mouth daily for 360 days. 4. PAF (paroxysmal atrial fibrillation) ([facility])-borderline bradycardic in the office. Denies any symptoms. Patient will monitor pulse closely. 5. Essential tremor-stable with current therapy    6. S/P TAVR (transcatheter aortic valve replacement)-exercise tolerance stable    7. Hyperlipidemia, unspecified hyperlipidemia type  -     atorvastatin (LIPITOR) 40 mg tablet; Take 1 Tab by mouth daily for 360 days. 8. Gastroesophageal reflux disease without esophagitis - stable. -     omeprazole (PRILOSEC) 20 mg capsule; Take 1 Cap by mouth daily. 9. Encounter for immunization  -     TETANUS, DIPHTHERIA TOXOIDS AND ACELLULAR PERTUSSIS VACCINE (TDAP), IN INDIVIDS. >=7, IM  -     [place] IMMUNIZ ADMIN,1 SINGLE/COMB VAC/TOXOID        Follow-up and Dispositions    · Return in about 6 months (around 12/23/2020). Brendan Lock MD  6/23/2020    This note was created with the help of speech recognition software [employer]) and may contain some 'sound alike' errors. This is the Subsequent Medicare Annual Wellness Exam, performed 12 months or more after the Initial AWV or the last Subsequent AWV    I have reviewed the patient's medical history in detail and updated the computerized patient record.      History     Patient Active Problem List   Diagnosis Code    OAB (overactive bladder) N32.81    Hyperlipidemia E78.5    History of melanoma Z85.820    Gastroesophageal reflux disease K21.9    Murmur R01.1    Essential tremor G25.0    Aortic valve stenosis I35.0    Coronary artery disease involving native heart without angina pectoris, unspecified vessel or lesion type I25.10    CAD (coronary artery disease) I25.10    CAD (coronary atherosclerotic disease) I25.10    AS (aortic stenosis) I35.0    S/P TAVR (transcatheter aortic valve replacement) Z95.2    PAF (paroxysmal atrial fibrillation) ([facility]) I48.0     Past Medical History:   Diagnosis Date    GERD (gastroesophageal reflux disease)     Hyperactivity of bladder     Hypercholesterolemia     Macular degeneration disease     Skin cancer (melanoma) ([facility])     Tremor       Past Surgical History:   Procedure Laterality Date    HX HERNIA REPAIR      HX MOHS PROCEDURES       Current Outpatient Medications   Medication Sig Dispense Refill    omeprazole (PRILOSEC) 20 mg capsule Take 1 Cap by mouth daily. 90 Cap 1    atorvastatin (LIPITOR) 40 mg tablet Take 1 Tab by mouth daily for 360 days. 90 Tab 3    Detrol LA 4 mg ER capsule TAKE 1 CAPSULE DAILY 90 Cap 3    apixaban (ELIQUIS) 5 mg tablet Take 1 Tab by mouth two (2) times a day. 180 Tab 1    metoprolol tartrate (LOPRESSOR) 25 mg tablet Take 1 Tab by mouth two (2) times a day. 180 Tab 1    primidone (MYSOLINE) 250 mg tablet Take 1 Tab by mouth nightly. 90 Tab 1    ipratropium (ATROVENT) 0.03 % nasal spray 2 Sprays by Both Nostrils route every twelve (12) hours. 30 mL 1    acetaminophen (TYLENOL) 325 mg tablet Take 2 Tabs by mouth every four (4) hours as needed for Pain or Fever. 60 Tab 2    magnesium oxide (MAG-OX) 400 mg tablet Take 400 mg by mouth daily.  NIASPAN 500 mg tablet daily.  Vit C-Vit E-Copper-ZnOx-Lutein (PRESERVISION LUTEIN) 226 mg-200 unit -5 mg-0.8 mg cap Take  by mouth.  COQ10, UBIQUINOL, PO Take  by mouth.  cholecalciferol (VITAMIN D3) 1,000 unit cap Take  by mouth daily.       cyanocobalamin 1,000 mcg tablet Take 1,000 mcg by mouth daily.  folic acid (FOLVITE) 1 mg tablet Take  by mouth daily. Allergies   Allergen Reactions    Penicillins Hives       Family History   Problem Relation Age of Onset    Cancer Father     Lung Cancer Father         smoker    Heart Disease Brother     Diabetes Brother     Diabetes Maternal Grandfather     Prostate Cancer Brother      Social History     Tobacco Use    Smoking status: Never Smoker    Smokeless tobacco: Never Used   Substance Use Topics    Alcohol use: Yes     Alcohol/week: 5.0 standard drinks     Types: 5 Glasses of wine per week     Comment: beer or glass of wine nightly        Depression Risk Factor Screening:     3 most recent PHQ Screens 6/23/2020   Little interest or pleasure in doing things Not at all   Feeling down, depressed, irritable, or hopeless Not at all   Total Score PHQ 2 0   Trouble falling or staying asleep, or sleeping too much Not at all   Feeling tired or having little energy Not at all   Poor appetite, weight loss, or overeating Not at all   Feeling bad about yourself - or that you are a failure or have let yourself or your family down Not at all   Trouble concentrating on things such as school, work, reading, or watching TV Not at all   Moving or speaking so slowly that other people could have noticed; or the opposite being so fidgety that others notice Not at all   Thoughts of being better off dead, or hurting yourself in some way Not at all   PHQ 9 Score 0   How difficult have these problems made it for you to do your work, take care of your home and get along with others Not difficult at all       Alcohol Risk Factor Screening (MALE > 65): Do you average more 1 drink per night or more than 7 drinks a week: No    In the past three months have you have had more than 4 drinks containing alcohol on one occasion: No      Functional Ability and Level of Safety:   Hearing: Hearing is good. Activities of Daily Living:   The home contains: no safety equipment. Patient does total self care     Ambulation: with no difficulty     Fall Risk:  Fall Risk Assessment, last 12 mths 6/23/2020   Able to walk? Yes   Fall in past 12 months? No     Abuse Screen:  Patient is not abused       Cognitive Screening   Has your family/caregiver stated any concerns about your memory: no      Patient Care Team   Patient Care Team:  Lanre Cueto MD as PCP - General (Internal Medicine)  Lanre Cueto MD as PCP - [facility]  Hank Paniagua MD as Physician (Ophthalmology)  Jamie Romero MD (Cardiology)    Assessment/Plan   Education and counseling provided:  Are appropriate based on today's review and evaluation  End-of-Life planning (with patient's consent)  Pneumococcal Vaccine    Diagnoses and all orders for this visit:    1. Medicare annual wellness visit, subsequent    2. Advanced directives, counseling/discussion    3. Coronary artery disease involving native coronary artery of native heart without angina pectoris  -     atorvastatin (LIPITOR) 40 mg tablet; Take 1 Tab by mouth daily for 360 days. 4. PAF (paroxysmal atrial fibrillation) ([facility] [address]    5. Essential tremor    6. S/P TAVR (transcatheter aortic valve replacement)    7. Hyperlipidemia, unspecified hyperlipidemia type  -     atorvastatin (LIPITOR) 40 mg tablet; Take 1 Tab by mouth daily for 360 days. 8. Gastroesophageal reflux disease without esophagitis  -     omeprazole (PRILOSEC) 20 mg capsule; Take 1 Cap by mouth daily.     9. Encounter for immunization  -     TETANUS, DIPHTHERIA TOXOIDS AND ACELLULAR PERTUSSIS VACCINE (TDAP), IN INDIVIDS. >=7, IM  -     OR IMMUNIZ ADMIN,1 SINGLE/COMB VAC/TOXOID        Health Maintenance Due   Topic Date Due    DTaP/Tdap/Td series (1 - Tdap) 11/14/1955    Shingrix Vaccine Age 50> (1 of 2) 11/14/1984    GLAUCOMA SCREENING Q2Y  11/14/1999    Medicare Yearly Exam  10/16/2019

## 2020-06-23 NOTE — PATIENT INSTRUCTIONS
Medicare Wellness Visit, Male The best way to live healthy is to have a lifestyle where you eat a well-balanced diet, exercise regularly, limit alcohol use, and quit all forms of tobacco/nicotine, if applicable. Regular preventive services are another way to keep healthy. Preventive services (vaccines, screening tests, monitoring & exams) can help personalize your care plan, which helps you manage your own care. Screening tests can find health problems at the earliest stages, when they are easiest to treat. [facility] follows the current, evidence-based guidelines published by the [facility] Jaun Alen ([facility]) when recommending preventive services for our patients. Because we follow these guidelines, sometimes recommendations change over time as research supports it. (For example, a prostate screening blood test is no longer routinely recommended for men with no symptoms). Of course, you and your doctor may decide to screen more often for some diseases, based on your risk and co-morbidities (chronic disease you are already diagnosed with). Preventive services for you include: - Medicare offers their members a free annual wellness visit, which is time for you and your primary care provider to discuss and plan for your preventive service needs. Take advantage of this benefit every year! 
-All adults over age 72 should receive the recommended pneumonia vaccines. Current USPSTF guidelines recommend a series of two vaccines for the best pneumonia protection.  
-All adults should have a flu vaccine yearly and tetanus vaccine every 10 years. 
-All adults age 48 and older should receive the shingles vaccines (series of two vaccines).       
-All adults age 38-68 who are overweight should have a diabetes screening test once every three years.  
-Other screening tests & preventive services for persons with diabetes include: an eye exam to screen for diabetic retinopathy, a kidney function test, a foot exam, and stricter control over your cholesterol.  
-Cardiovascular screening for adults with routine risk involves an electrocardiogram (ECG) at intervals determined by the provider.  
-Colorectal cancer screening should be done for adults age 54-65 with no increased risk factors for colorectal cancer. There are a number of acceptable methods of screening for this type of cancer. Each test has its own benefits and drawbacks. Discuss with your provider what is most appropriate for you during your annual wellness visit. The different tests include: colonoscopy (considered the best screening method), a fecal occult blood test, a fecal DNA test, and sigmoidoscopy. 
-All adults born between [facility] should be screened once for Hepatitis C. 
-An Abdominal Aortic Aneurysm (AAA) Screening is recommended for men age 73-68 who has ever smoked in their lifetime. Here is a list of your current Health Maintenance items (your personalized list of preventive services) with a due date: 
Health Maintenance Due Topic Date Due  
 DTaP/Tdap/Td  (1 - Tdap) 11/14/1955  Shingles Vaccine (1 of 2) 11/14/1984  Glaucoma Screening   11/14/1999 [place] Annual Well Visit  10/16/2019

## 2020-06-23 NOTE — PROGRESS NOTES
Eye exams: [facility].      Shingles vaccine: Pt has been educated on new shingrix and where to obtain inj.

## 2020-06-23 NOTE — TELEPHONE ENCOUNTER
Received this from patient via [employer] message. Since I stopped Plavix should I be taking a 81 mg aspirin each day? Please advise.

## 2020-06-25 ENCOUNTER — HOSPITAL ENCOUNTER (OUTPATIENT)
Dept: CARDIAC REHAB | Age: 85
Discharge: HOME | End: 2020-06-25
Payer: MEDICARE

## 2020-06-25 VITALS — WEIGHT: 176 LBS | BODY MASS INDEX: 26.76 KG/M2

## 2020-06-25 PROCEDURE — 93798 PHYS/QHP OP CAR RHAB W/ECG: CPT

## 2020-07-01 ENCOUNTER — HOSPITAL ENCOUNTER (OUTPATIENT)
Dept: CARDIAC REHAB | Age: 85
Discharge: HOME | End: 2020-07-01
Payer: MEDICARE

## 2020-07-01 VITALS — WEIGHT: 177 LBS | BODY MASS INDEX: 26.91 KG/M2

## 2020-07-01 PROCEDURE — 93798 PHYS/QHP OP CAR RHAB W/ECG: CPT

## 2020-07-02 ENCOUNTER — HOSPITAL ENCOUNTER (OUTPATIENT)
Dept: CARDIAC REHAB | Age: 85
Discharge: HOME | End: 2020-07-02
Payer: MEDICARE

## 2020-07-02 PROCEDURE — 93798 PHYS/QHP OP CAR RHAB W/ECG: CPT

## 2020-07-04 DIAGNOSIS — G25.0 ESSENTIAL TREMOR: ICD-10-CM

## 2020-07-04 RX ORDER — PRIMIDONE 250 MG/1
TABLET ORAL
Qty: 90 TAB | Refills: 3 | Status: SHIPPED | OUTPATIENT
Start: 2020-07-04 | End: 2020-08-20

## 2020-07-08 ENCOUNTER — HOSPITAL ENCOUNTER (OUTPATIENT)
Dept: CARDIAC REHAB | Age: 85
Discharge: HOME | End: 2020-07-08
Payer: MEDICARE

## 2020-07-08 VITALS — BODY MASS INDEX: 26.61 KG/M2 | WEIGHT: 175 LBS

## 2020-07-08 PROCEDURE — 93798 PHYS/QHP OP CAR RHAB W/ECG: CPT

## 2020-07-10 ENCOUNTER — HOSPITAL ENCOUNTER (OUTPATIENT)
Dept: CARDIAC REHAB | Age: 85
Discharge: HOME | End: 2020-07-10
Payer: MEDICARE

## 2020-07-10 VITALS — BODY MASS INDEX: 26.46 KG/M2 | WEIGHT: 174 LBS

## 2020-07-10 PROCEDURE — 93798 PHYS/QHP OP CAR RHAB W/ECG: CPT

## 2020-07-13 ENCOUNTER — HOSPITAL ENCOUNTER (OUTPATIENT)
Dept: CARDIAC REHAB | Age: 85
Discharge: HOME | End: 2020-07-13
Payer: MEDICARE

## 2020-07-13 VITALS — WEIGHT: 175 LBS | BODY MASS INDEX: 26.61 KG/M2

## 2020-07-13 PROCEDURE — 93798 PHYS/QHP OP CAR RHAB W/ECG: CPT

## 2020-07-15 ENCOUNTER — TELEPHONE (OUTPATIENT)
Dept: CARDIOLOGY CLINIC | Age: 85
End: 2020-07-15

## 2020-07-15 NOTE — TELEPHONE ENCOUNTER
Patient would like to know if he needs to take antibiotics before dental work scheduled for 7/21. Please advise.      Phone: [phone]

## 2020-07-16 ENCOUNTER — HOSPITAL ENCOUNTER (OUTPATIENT)
Dept: CARDIAC REHAB | Age: 85
Discharge: HOME | End: 2020-07-16
Payer: MEDICARE

## 2020-07-16 VITALS — BODY MASS INDEX: 26.61 KG/M2 | WEIGHT: 175 LBS

## 2020-07-16 PROCEDURE — 93798 PHYS/QHP OP CAR RHAB W/ECG: CPT

## 2020-07-17 NOTE — TELEPHONE ENCOUNTER
Called patient advised per Dr Augustin Tuttle No he does not need abx     Patient verbalized understanding.

## 2020-07-20 ENCOUNTER — HOSPITAL ENCOUNTER (OUTPATIENT)
Dept: CARDIAC REHAB | Age: 85
Discharge: HOME | End: 2020-07-20
Payer: MEDICARE

## 2020-07-20 VITALS — BODY MASS INDEX: 26.3 KG/M2 | WEIGHT: 173 LBS

## 2020-07-20 PROCEDURE — 93798 PHYS/QHP OP CAR RHAB W/ECG: CPT

## 2020-07-21 RX ORDER — METOPROLOL TARTRATE 25 MG/1
TABLET, FILM COATED ORAL
Qty: 180 TAB | Refills: 3 | Status: SHIPPED | OUTPATIENT
Start: 2020-07-21 | End: 2020-12-15

## 2020-07-21 RX ORDER — APIXABAN 5 MG/1
TABLET, FILM COATED ORAL
Qty: 180 TAB | Refills: 3 | Status: SHIPPED | OUTPATIENT
Start: 2020-07-21 | End: 2021-07-08 | Stop reason: SDUPTHER

## 2020-07-23 ENCOUNTER — HOSPITAL ENCOUNTER (OUTPATIENT)
Dept: CARDIAC REHAB | Age: 85
Discharge: HOME | End: 2020-07-23
Payer: MEDICARE

## 2020-07-23 PROCEDURE — 93798 PHYS/QHP OP CAR RHAB W/ECG: CPT

## 2020-07-24 VITALS — BODY MASS INDEX: 26.3 KG/M2 | WEIGHT: 173 LBS

## 2020-07-27 ENCOUNTER — HOSPITAL ENCOUNTER (OUTPATIENT)
Dept: CARDIAC REHAB | Age: 85
Discharge: HOME | End: 2020-07-27
Payer: MEDICARE

## 2020-07-27 VITALS — BODY MASS INDEX: 26.61 KG/M2 | WEIGHT: 175 LBS

## 2020-07-27 PROCEDURE — 93798 PHYS/QHP OP CAR RHAB W/ECG: CPT

## 2020-07-30 ENCOUNTER — HOSPITAL ENCOUNTER (OUTPATIENT)
Dept: CARDIAC REHAB | Age: 85
Discharge: HOME | End: 2020-07-30
Payer: MEDICARE

## 2020-07-30 VITALS — BODY MASS INDEX: 26.46 KG/M2 | WEIGHT: 174 LBS

## 2020-07-30 PROCEDURE — 93798 PHYS/QHP OP CAR RHAB W/ECG: CPT

## 2020-07-31 ENCOUNTER — OFFICE VISIT (OUTPATIENT)
Dept: CARDIOLOGY CLINIC | Age: 85
End: 2020-07-31

## 2020-07-31 VITALS
HEART RATE: 44 BPM | SYSTOLIC BLOOD PRESSURE: 122 MMHG | OXYGEN SATURATION: 98 % | BODY MASS INDEX: 26.52 KG/M2 | WEIGHT: 175 LBS | HEIGHT: 68 IN | DIASTOLIC BLOOD PRESSURE: 70 MMHG | RESPIRATION RATE: 20 BRPM

## 2020-07-31 DIAGNOSIS — E78.5 HYPERLIPIDEMIA, UNSPECIFIED HYPERLIPIDEMIA TYPE: ICD-10-CM

## 2020-07-31 DIAGNOSIS — I25.10 CORONARY ARTERY DISEASE INVOLVING NATIVE CORONARY ARTERY OF NATIVE HEART WITHOUT ANGINA PECTORIS: Primary | ICD-10-CM

## 2020-07-31 DIAGNOSIS — Z95.2 S/P TAVR (TRANSCATHETER AORTIC VALVE REPLACEMENT): ICD-10-CM

## 2020-07-31 DIAGNOSIS — I48.0 PAF (PAROXYSMAL ATRIAL FIBRILLATION) (HCC): ICD-10-CM

## 2020-07-31 NOTE — PROGRESS NOTES
Visit Vitals  /70   Pulse (!) 44   Resp 20   Ht 5' 8\" (1.727 m)   Wt 175 lb (79.4 kg)   SpO2 98%   BMI 26.61 kg/m²     Denies CP,SOB,edema or dizziness.   In Cardiac rehab

## 2020-07-31 NOTE — PROGRESS NOTES
Bren Marquez MD    [id number] [facility] [place], [address] [facility] [place]    Office [phone],[facility] [phone]              Primary care physician:  Jesus Marshall MD    Patient Active Problem List   Diagnosis Code    OAB (overactive bladder) N32.81    Hyperlipidemia E78.5    History of melanoma Z85.820    Gastroesophageal reflux disease K21.9    Murmur R01.1    Essential tremor G25.0    Aortic valve stenosis I35.0    Coronary artery disease involving native heart without angina pectoris, unspecified vessel or lesion type I25.10    CAD (coronary artery disease) I25.10    CAD (coronary atherosclerotic disease) I25.10    AS (aortic stenosis) I35.0    S/P TAVR (transcatheter aortic valve replacement) Z95.2    PAF (paroxysmal atrial fibrillation) ([facility]) I48.0           Chief complaint:  Chief Complaint   Patient presents with    Coronary Artery Disease    Irregular Heart Beat       Assessment:  Severe AS- S/p TAVR  CAD - s/p LAD stent  PAF  HLD  Hx of GERD  Sinus bradycardia      Plan:     Heart rate in 40s-decrease metoprolol 25 mg to 12.5 mg twice daily. If after 3 4 weeks, his heart rate continues to stay in the 40s, he will discontinue the metoprolol. Doing well from a cardiac standpoint. On Eliquis  Aggressive cardiovascular risk factor modification. Patient understands the plan. All questions were answered to the patient's satisfaction. Patient was made aware and verbalized understanding that an appointment will be scheduled for them for a tel/virtual visit and/or office visit within the above time frame. Patient understanding his/her responsibility to call and change time/date if he/she so chooses. .     Greater than 20 minutes was spent in direct tel patient care, planning and chart review.         Medication Side Effects and Warnings were discussed with patient: yes  Patient Labs were reviewed and or requested:  yes  Patient Past Records were reviewed and or requested: yes    I appreciate the opportunity to be involved in . Please see note below for details. Please do not hesitate to contact us with questions or concerns. Aidee Duff MD    Cardiac Testing/ Procedures: A. Cardiac Cath/PCI:12/4/19 ( Dr Pura Rosario) Findings:  1)Severe mid LAD calcified disease  2)S/p PCI with 3 by 38 mm Xience EFFIE placement following Rotational atherectomy with 1.75 mm [place]. Post dilatation with 3.25 mm Bballoon at 20-24 KYRA. Good final result. Moderate distal disease deferred.     Access: Right radial--no issues     11/6/19 R Brachial vein access - 7 F sheath  R Radial access - 6 F sheath     Calcified coronaries  RCA - 3DRC; LCA - JL4  Codominant     L Main: Large; MLI     LAD: Prox - large; Mid/Distal Med; Ostial 30%; Mid - Ca+++; 80% ( haziness prob sec to Ca+); Another mid lesion - 70% ; Small to med D1- Prox 30%; Small  D2; Large tortuous septal     LCflex: Med to large; MLI; OM1 - Med to large; Prox 50%     RCA: Med to large; Prox/Mid - MLI; Distal at bifurcation 60-70%- small PDA/PLB     LVEDP: valve not crossed     LVEF: not assessed     No significant gradient across aortic valve.           RHC findings:     RAPm= 4      mmHg  RVSP= 19    mmHg  PAPm= 14       mmHg  PCWPm=7    mmHg  CO=   6.02       l/min  CI=3.08     Specimens Removed : None     Complications: None     Closure Device: TR    B.ECHO/SHARAN:  7/28/20 · LV: Normal cavity size and systolic function (ejection fraction normal). Mildly increased wall thickness. Calculated left ventricular ejection fraction is 61%. Biplane method used to measure ejection fraction. Mild (grade 1) left ventricular diastolic dysfunction. · LA: Moderately dilated left atrium. · AV: Prosthetic aortic valve. Aortic valve mean gradient is 9.9 mmHg. There is a 26 mm KIM prosthetic aortic valve from prior TAVR procedure. Mild perivalvular aortic valve regurgitation is present. · MV: Mitral valve thickening.  Mild mitral valve regurgitation is present. · TV: Mild tricuspid valve regurgitation is present. TR gradient = 41 mmHg; unable to assess IVC for RAP    1/14/20 Normal cavity size and systolic function (ejection fraction normal). Mild concentric hypertrophy. Calculated left ventricular ejection fraction is 63%. Biplane method used to measure ejection fraction. No regional wall motion abnormality noted. Mild (grade 1) left ventricular diastolic dysfunction. · Prosthetic aortic valve. There is a 26 mm Watson KIM prosthetic aortic valve from prior TAVR procedure. Prosthesis is normal. Trace paravalvular leak is present. Gradients are appropriate for this valve. Mitral valve thickening. Mild mitral annular calcification. Mild mitral valve regurgitation is present. 10/23/19 Left Ventricle: Normal cavity size and systolic function (ejection fraction normal). Mildly increased wall thickness. Estimated left ventricular ejection fraction is 56 - 60%. No regional wall motion abnormality noted. Mild (grade 1) left ventricular diastolic dysfunction. · Mitral Valve: Mitral annular calcification. Mild mitral valve regurgitation is present. · Left Atrium: Dilated left atrium. · Tricuspid Valve: Mild tricuspid valve regurgitation is present. · Aortic Valve: Severe aortic valve sclerosis with reduced excursion. Aortic valve leaflet calcification present with reduced excursion. Severe aortic valve stenosis is present. Mild to moderate aortic valve regurgitation is present. Aortic Valve Systolic Mean Gradient 56 mm Hg; AV peak gradient ( pedhoff) -74 mm Hg    C.StressNuclear/Stress ECHO/Stress test:    D.Vascular: 11/5/19 Carotid doppler 1-59% bilat ICA    E. EP: Event monitor-7-day 1/24/2020to  1/30/2020-0 critical, 0 serious and 2 stable events. Sinus bradycardia 1/27/2020-2:02 PM 38 bpm.-Auto detected. F. Miscellaneous: 12/20/19 1. Angiography of the ascending aorta and aortoiliac bifurcation  2.  Temporary transvenous pacemaker insertion  3. Left heart catheterization  4. Implantation of a 26 mm Watson S3 transcatheter aortic valve via the right transfemoral approach  5. 14F right?femoral artery access with Perclose pre-closure / 6F left femoral vein access with manual compression / 6F left femoral artery access with Perclose closure    Subjective:    Patient states that he has been doing well. He is walking in his neighborhood. No chest pain/edema/palpitations/syncope. ROS:  (bold if positive, if negative)           Medications before admission:    Current Outpatient Medications   Medication Sig Dispense    Eliquis 5 mg tablet TAKE 1 TABLET TWICE A  Tab    metoprolol tartrate (LOPRESSOR) 25 mg tablet TAKE 1 TABLET TWICE A  Tab    primidone (MYSOLINE) 250 mg tablet TAKE 1 TABLET NIGHTLY 90 Tab    omeprazole (PRILOSEC) 20 mg capsule Take 1 Cap by mouth daily. 90 Cap    atorvastatin (LIPITOR) 40 mg tablet Take 1 Tab by mouth daily for 360 days. 90 Tab    Detrol LA 4 mg ER capsule TAKE 1 CAPSULE DAILY 90 Cap    ipratropium (ATROVENT) 0.03 % nasal spray 2 Sprays by Both Nostrils route every twelve (12) hours. 30 mL    acetaminophen (TYLENOL) 325 mg tablet Take 2 Tabs by mouth every four (4) hours as needed for Pain or Fever. 60 Tab    magnesium oxide (MAG-OX) 400 mg tablet Take 400 mg by mouth daily.  NIASPAN 500 mg tablet daily.  Vit C-Vit E-Copper-ZnOx-Lutein (PRESERVISION LUTEIN) 226 mg-200 unit -5 mg-0.8 mg cap Take  by mouth.  COQ10, UBIQUINOL, PO Take  by mouth.  cholecalciferol (VITAMIN D3) 1,000 unit cap Take  by mouth daily.  cyanocobalamin 1,000 mcg tablet Take 1,000 mcg by mouth daily.  folic acid (FOLVITE) 1 mg tablet Take  by mouth daily. No current facility-administered medications for this visit.         Family History of CAD:    Yes    Social History:  Current  Smoker No    Physical Exam:  Visit Vitals  /70   Pulse (!) 44   Resp 20   Ht 5' 8\" (1.727 m)   Wt 175 lb (79.4 kg)   SpO2 98%   BMI 26.61 kg/m²        Gen:    Well-developed, well-nourished, in no acute distress  HEENT:  Pink conjunctivae, hearing intact to voice, moist mucous membranes  Neck:  Supple,No JVD, No Carotid Bruit, Thyroid- non tender  Resp:  No accessory muscle use, Clear breath sounds, No rales or rhonchi  Card:   Regular Rate,Rythm,Normal S1, S2,2/6 sys murmur+,No rubs or gallop. No thrills.    Abd:  Soft,  normoactive bowel sounds are present,   MSK:   No cyanosis  Skin:   No rashes or ulcers  Neuro:  moving all four extremities, no focal deficit, follows commands appropriately  Psych:  Good insight, oriented to person, place and time, alert, Nml Affect  LE: No edema  Vascular: Radial Pulses 2+ and symmetric      EKG:        LABS:        Lab Results   Component Value Date/Time    WBC 5.1 03/10/2020 09:05 AM    HGB 14.5 03/10/2020 09:05 AM    HCT 43.5 03/10/2020 09:05 AM    PLATELET 058 (L) 33/81/7598 09:05 AM     Lab Results   Component Value Date/Time    Sodium 141 03/10/2020 09:05 AM    Potassium 4.2 03/10/2020 09:05 AM    Chloride 102 03/10/2020 09:05 AM    CO2 25 03/10/2020 09:05 AM    Anion gap 3 (L) 01/22/2020 02:04 PM    Glucose 81 03/10/2020 09:05 AM    BUN 13 03/10/2020 09:05 AM    Creatinine 0.81 03/10/2020 09:05 AM    BUN/Creatinine ratio 16 03/10/2020 09:05 AM    GFR est AA 94 03/10/2020 09:05 AM    GFR est non-AA 81 03/10/2020 09:05 AM    Calcium 9.3 03/10/2020 09:05 AM       Lab Results   Component Value Date/Time    aPTT 30.7 12/20/2019 10:59 AM     Lab Results   Component Value Date/Time    INR 1.2 (H) 01/22/2020 02:04 PM    INR 1.2 (H) 12/20/2019 10:59 AM    INR 1.2 (H) 12/17/2019 10:15 AM    Prothrombin time 11.9 (H) 01/22/2020 02:04 PM    Prothrombin time 12.2 (H) 12/20/2019 10:59 AM    Prothrombin time 11.7 (H) 12/17/2019 10:15 AM     No components found for: LAST LIPIDS    ATTENTION:   This medical record was transcribed using an electronic medical records/speech recognition system. Although proofread, it may and can contain electronic, spelling and other errors. Corrections may be executed at a later time. Please feel free to contact us for any clarifications as needed.       Jerman Melgoza MD

## 2020-08-03 ENCOUNTER — HOSPITAL ENCOUNTER (OUTPATIENT)
Dept: CARDIAC REHAB | Age: 85
Discharge: HOME | End: 2020-08-03
Payer: MEDICARE

## 2020-08-03 VITALS — WEIGHT: 173 LBS | BODY MASS INDEX: 26.3 KG/M2

## 2020-08-03 PROCEDURE — 93798 PHYS/QHP OP CAR RHAB W/ECG: CPT

## 2020-08-06 ENCOUNTER — HOSPITAL ENCOUNTER (OUTPATIENT)
Dept: CARDIAC REHAB | Age: 85
Discharge: HOME | End: 2020-08-06
Payer: MEDICARE

## 2020-08-06 VITALS — BODY MASS INDEX: 26.3 KG/M2 | WEIGHT: 173 LBS

## 2020-08-06 PROCEDURE — 93798 PHYS/QHP OP CAR RHAB W/ECG: CPT

## 2020-08-10 ENCOUNTER — HOSPITAL ENCOUNTER (OUTPATIENT)
Dept: CARDIAC REHAB | Age: 85
Discharge: HOME | End: 2020-08-10
Payer: MEDICARE

## 2020-08-10 VITALS — WEIGHT: 175 LBS | BODY MASS INDEX: 26.61 KG/M2

## 2020-08-10 PROCEDURE — 93798 PHYS/QHP OP CAR RHAB W/ECG: CPT

## 2020-08-13 ENCOUNTER — HOSPITAL ENCOUNTER (OUTPATIENT)
Dept: CARDIAC REHAB | Age: 85
Discharge: HOME | End: 2020-08-13
Payer: MEDICARE

## 2020-08-13 VITALS — WEIGHT: 173 LBS | BODY MASS INDEX: 26.3 KG/M2

## 2020-08-13 PROCEDURE — 93798 PHYS/QHP OP CAR RHAB W/ECG: CPT

## 2020-08-20 ENCOUNTER — TELEPHONE (OUTPATIENT)
Dept: INTERNAL MEDICINE CLINIC | Age: 85
End: 2020-08-20

## 2020-08-20 DIAGNOSIS — G25.0 ESSENTIAL TREMOR: ICD-10-CM

## 2020-08-20 RX ORDER — PRIMIDONE 250 MG/1
TABLET ORAL
Qty: 135 TAB | Refills: 3 | Status: SHIPPED | OUTPATIENT
Start: 2020-08-20 | End: 2021-03-29

## 2020-08-20 NOTE — TELEPHONE ENCOUNTER
Patient request an increase in dosage on his tremor medication Rx Primidone. Patient stated he is having a hard time with his tremors lately.  Patient best contact  [phone]

## 2020-08-20 NOTE — TELEPHONE ENCOUNTER
Suggest taking 1/2 tablet in the morning and keeping the 250 at bedtime. Patient to see me about 6 weeks virtually to see how he is doing.

## 2020-08-20 NOTE — TELEPHONE ENCOUNTER
Advised pt of Dr. Sterling Russian instructions. Pt understood and will schedule a f/u appt in 6 wks.

## 2020-10-27 ENCOUNTER — OFFICE VISIT (OUTPATIENT)
Dept: CARDIOLOGY CLINIC | Age: 85
End: 2020-10-27
Payer: MEDICARE

## 2020-10-27 VITALS
SYSTOLIC BLOOD PRESSURE: 128 MMHG | DIASTOLIC BLOOD PRESSURE: 78 MMHG | WEIGHT: 174 LBS | OXYGEN SATURATION: 97 % | HEART RATE: 48 BPM | HEIGHT: 68 IN | BODY MASS INDEX: 26.37 KG/M2

## 2020-10-27 DIAGNOSIS — I48.0 PAF (PAROXYSMAL ATRIAL FIBRILLATION) (HCC): ICD-10-CM

## 2020-10-27 DIAGNOSIS — I35.0 NONRHEUMATIC AORTIC VALVE STENOSIS: ICD-10-CM

## 2020-10-27 DIAGNOSIS — I25.10 CORONARY ARTERY DISEASE INVOLVING NATIVE CORONARY ARTERY OF NATIVE HEART WITHOUT ANGINA PECTORIS: Primary | ICD-10-CM

## 2020-10-27 DIAGNOSIS — E78.2 MIXED HYPERLIPIDEMIA: ICD-10-CM

## 2020-10-27 DIAGNOSIS — Z95.2 S/P TAVR (TRANSCATHETER AORTIC VALVE REPLACEMENT): ICD-10-CM

## 2020-10-27 PROCEDURE — 99214 OFFICE O/P EST MOD 30 MIN: CPT | Performed by: INTERNAL MEDICINE

## 2020-10-27 PROCEDURE — G8419 CALC BMI OUT NRM PARAM NOF/U: HCPCS | Performed by: INTERNAL MEDICINE

## 2020-10-27 PROCEDURE — G8432 DEP SCR NOT DOC, RNG: HCPCS | Performed by: INTERNAL MEDICINE

## 2020-10-27 PROCEDURE — G8427 DOCREV CUR MEDS BY ELIG CLIN: HCPCS | Performed by: INTERNAL MEDICINE

## 2020-10-27 PROCEDURE — G0463 HOSPITAL OUTPT CLINIC VISIT: HCPCS | Performed by: INTERNAL MEDICINE

## 2020-10-27 PROCEDURE — G8536 NO DOC ELDER MAL SCRN: HCPCS | Performed by: INTERNAL MEDICINE

## 2020-10-27 PROCEDURE — 1101F PT FALLS ASSESS-DOCD LE1/YR: CPT | Performed by: INTERNAL MEDICINE

## 2020-10-27 NOTE — PROGRESS NOTES
Mark Barnes MD    [address] [facility] [place], [address] [facility]    Office [phone],[facility] [phone]            Dear Dr Beth Arango,    I had the pleasure of seeing Mr. Kait Pulido in the office today. Assessment:  Severe AS- S/p TAVR  CAD - s/p LAD stent  PAF  HLD  Hx of GERD  Sinus bradycardia      Plan:     Not on B blockers sec to bradycardia. HR in 50-60's off B blockers  On Eliquis  Aggressive cardiovascular risk factor modification. F/u 6 mths/earlier prn          Medication Side Effects and Warnings were discussed with patient: yes  Patient Labs were reviewed and or requested:  yes  Patient Past Records were reviewed and or requested: yes    I appreciate the opportunity to be involved in . Please see note below for details. Please do not hesitate to contact us with questions or concerns. Mark Barnes MD    Cardiac Testing/ Procedures: A. Cardiac Cath/PCI:12/4/19 ( Dr Tala Yen) Findings:  1)Severe mid LAD calcified disease  2)S/p PCI with 3 by 38 mm Xience EFFIE placement following Rotational atherectomy with 1.75 mm [place]. Post dilatation with 3.25 mm Bballoon at 20-24 KYRA. Good final result. Moderate distal disease deferred.     Access: Right radial--no issues     11/6/19 R Brachial vein access - 7 F sheath  R Radial access - 6 F sheath     Calcified coronaries  RCA - 3DRC; LCA - JL4  Codominant     L Main: Large; MLI     LAD: Prox - large; Mid/Distal Med; Ostial 30%; Mid - Ca+++; 80% ( haziness prob sec to Ca+); Another mid lesion - 70% ; Small to med D1- Prox 30%;  Small  D2; Large tortuous septal     LCflex: Med to large; MLI; OM1 - Med to large; Prox 50%     RCA: Med to large; Prox/Mid - MLI; Distal at bifurcation 60-70%- small PDA/PLB     LVEDP: valve not crossed     LVEF: not assessed     No significant gradient across aortic valve.           RHC findings:     RAPm= 4      mmHg  RVSP= 19    mmHg  PAPm= 14       mmHg  PCWPm=7    mmHg  CO= 6.02       l/min  CI=3.08     Specimens Removed : None     Complications: None     Closure Device: TR    B.ECHO/SHARAN:  7/28/20 · LV: Normal cavity size and systolic function (ejection fraction normal). Mildly increased wall thickness. Calculated left ventricular ejection fraction is 61%. Biplane method used to measure ejection fraction. Mild (grade 1) left ventricular diastolic dysfunction. · LA: Moderately dilated left atrium. · AV: Prosthetic aortic valve. Aortic valve mean gradient is 9.9 mmHg. There is a 26 mm KIM prosthetic aortic valve from prior TAVR procedure. Mild perivalvular aortic valve regurgitation is present. · MV: Mitral valve thickening. Mild mitral valve regurgitation is present. · TV: Mild tricuspid valve regurgitation is present. TR gradient = 41 mmHg; unable to assess IVC for RAP    1/14/20 Normal cavity size and systolic function (ejection fraction normal). Mild concentric hypertrophy. Calculated left ventricular ejection fraction is 63%. Biplane method used to measure ejection fraction. No regional wall motion abnormality noted. Mild (grade 1) left ventricular diastolic dysfunction. · Prosthetic aortic valve. There is a 26 mm Watson KIM prosthetic aortic valve from prior TAVR procedure. Prosthesis is normal. Trace paravalvular leak is present. Gradients are appropriate for this valve. Mitral valve thickening. Mild mitral annular calcification. Mild mitral valve regurgitation is present. 10/23/19 Left Ventricle: Normal cavity size and systolic function (ejection fraction normal). Mildly increased wall thickness. Estimated left ventricular ejection fraction is 56 - 60%. No regional wall motion abnormality noted. Mild (grade 1) left ventricular diastolic dysfunction. · Mitral Valve: Mitral annular calcification. Mild mitral valve regurgitation is present. · Left Atrium: Dilated left atrium. · Tricuspid Valve: Mild tricuspid valve regurgitation is present.   · Aortic Valve: Severe aortic valve sclerosis with reduced excursion. Aortic valve leaflet calcification present with reduced excursion. Severe aortic valve stenosis is present. Mild to moderate aortic valve regurgitation is present. Aortic Valve Systolic Mean Gradient 56 mm Hg; AV peak gradient ( pedhoff) -74 mm Hg    C.StressNuclear/Stress ECHO/Stress test:    D.Vascular: 11/5/19 Carotid doppler 1-59% bilat ICA    E. EP: Event monitor-7-day 1/24/2020to  1/30/2020-0 critical, 0 serious and 2 stable events. Sinus bradycardia 1/27/2020-2:02 PM 38 bpm.-Auto detected. F. Miscellaneous: 12/20/19 1. Angiography of the ascending aorta and aortoiliac bifurcation  2. Temporary transvenous pacemaker insertion  3. Left heart catheterization  4. Implantation of a 26 mm Watson S3 transcatheter aortic valve via the right transfemoral approach  5. 14F right?femoral artery access with Perclose pre-closure / 6F left femoral vein access with manual compression / 6F left femoral artery access with Perclose closure    Subjective:    Patient states that he has been doing well. He is walking in his neighborhood. No chest pain/edema/palpitations/syncope. ROS:  (bold if positive, if negative)           Medications before admission:    Current Outpatient Medications   Medication Sig Dispense    multivitamin with minerals (HAIR,SKIN AND NAILS PO) Take  by mouth.  Niaspan 500 mg tablet Take 1 Tab by mouth daily. 90 Tab    primidone (MYSOLINE) 250 mg tablet Take 1/2 tablet in the morning and 1 TABLET at bedtime 135 Tab    Eliquis 5 mg tablet TAKE 1 TABLET TWICE A  Tab    omeprazole (PRILOSEC) 20 mg capsule Take 1 Cap by mouth daily. 90 Cap    atorvastatin (LIPITOR) 40 mg tablet Take 1 Tab by mouth daily for 360 days. 90 Tab    Detrol LA 4 mg ER capsule TAKE 1 CAPSULE DAILY 90 Cap    ipratropium (ATROVENT) 0.03 % nasal spray 2 Sprays by Both Nostrils route every twelve (12) hours.  30 mL    acetaminophen (TYLENOL) 325 mg tablet Take 2 Tabs by mouth every four (4) hours as needed for Pain or Fever. 60 Tab    magnesium oxide (MAG-OX) 400 mg tablet Take 250 mg by mouth daily.  Vit C-Vit E-Copper-ZnOx-Lutein (PRESERVISION LUTEIN) 226 mg-200 unit -5 mg-0.8 mg cap Take  by mouth.  COQ10, UBIQUINOL, PO Take  by mouth.  cholecalciferol (VITAMIN D3) 1,000 unit cap Take  by mouth daily.  cyanocobalamin 1,000 mcg tablet Take 5,000 mcg by mouth daily.  folic acid (FOLVITE) 1 mg tablet Take  by mouth daily.  metoprolol tartrate (LOPRESSOR) 25 mg tablet TAKE 1 TABLET TWICE A DAY (Patient not taking: Reported on 10/27/2020) 180 Tab     No current facility-administered medications for this visit. Family History of CAD:    Yes    Social History:  Current  Smoker No    Physical Exam:  Visit Vitals  /78 (BP 1 Location: Left arm, BP Patient Position: Sitting)   Pulse (!) 48   Ht 5' 8\" (1.727 m)   Wt 174 lb (78.9 kg)   SpO2 97%   BMI 26.46 kg/m²        Gen:    Well-developed, well-nourished, in no acute distress  HEENT:  Pink conjunctivae, hearing intact to voice, moist mucous membranes  Neck:  Supple,No JVD, No Carotid Bruit, Thyroid- non tender  Resp:  No accessory muscle use, Clear breath sounds, No rales or rhonchi  Card:   Regular Rate,Rythm,Normal S1, S2,2/6 sys murmur+,No rubs or gallop. No thrills. Abd:  Soft,  normoactive bowel sounds are present,   MSK:   No cyanosis  Skin:   No rashes or ulcers  Neuro:  moving all four extremities, no focal deficit, follows commands appropriately  Psych:  Good insight, oriented to person, place and time, alert, Nml Affect  LE: No edema  Vascular: Radial Pulses 2+ and symmetric      EKG:        LABS: LDL 75 3/2020        ATTENTION:   This medical record was transcribed using an electronic medical records/speech recognition system. Although proofread, it may and can contain electronic, spelling and other errors. Corrections may be executed at a later time.   Please feel free to contact us for any clarifications as needed.       Osorio Sung MD

## 2020-10-27 NOTE — PROGRESS NOTES
Lizbeth Pugh is a 80 y.o. male    Chief Complaint   Patient presents with    Coronary Artery Disease    Cholesterol Problem       Chest pain No    SOB No    Dizziness No    Swelling No    Refills No    Visit Vitals  /78 (BP 1 Location: Left arm, BP Patient Position: Sitting)   Pulse (!) 48   Ht 5' 8\" (1.727 m)   Wt 174 lb (78.9 kg)   SpO2 97%   BMI 26.46 kg/m²       1. Have you been to the ER, urgent care clinic since your last visit? Hospitalized since your last visit? No    2. Have you seen or consulted any other health care providers outside of the [address] [place] [address] since your last visit? Include any pap smears or colon screening.   No

## 2020-12-08 ENCOUNTER — TELEPHONE (OUTPATIENT)
Dept: CARDIOLOGY CLINIC | Age: 85
End: 2020-12-08

## 2020-12-08 NOTE — TELEPHONE ENCOUNTER
TVT Registry 1 year follow-up:  KCCQ- 12 completed over the phone with patient. Forms scanned into EMR. NYHA Class:l  Medications listed in EMR review with patient: Metoprolol stopped by cardiologist.  Hospitalizations over the past year: No  Last TTE: 7/28/2020  SBE reviewed with patient. Walk and clinic visit not completed due to COVID-19.

## 2020-12-15 ENCOUNTER — OFFICE VISIT (OUTPATIENT)
Dept: INTERNAL MEDICINE CLINIC | Age: 85
End: 2020-12-15
Payer: MEDICARE

## 2020-12-15 VITALS
DIASTOLIC BLOOD PRESSURE: 80 MMHG | BODY MASS INDEX: 26.83 KG/M2 | HEIGHT: 68 IN | OXYGEN SATURATION: 99 % | RESPIRATION RATE: 16 BRPM | SYSTOLIC BLOOD PRESSURE: 130 MMHG | HEART RATE: 58 BPM | WEIGHT: 177 LBS | TEMPERATURE: 97.8 F

## 2020-12-15 DIAGNOSIS — I25.10 CORONARY ARTERY DISEASE INVOLVING NATIVE CORONARY ARTERY OF NATIVE HEART WITHOUT ANGINA PECTORIS: ICD-10-CM

## 2020-12-15 DIAGNOSIS — G25.0 ESSENTIAL TREMOR: ICD-10-CM

## 2020-12-15 DIAGNOSIS — E78.5 HYPERLIPIDEMIA, UNSPECIFIED HYPERLIPIDEMIA TYPE: Primary | ICD-10-CM

## 2020-12-15 DIAGNOSIS — Z95.2 S/P TAVR (TRANSCATHETER AORTIC VALVE REPLACEMENT): ICD-10-CM

## 2020-12-15 DIAGNOSIS — I48.0 PAF (PAROXYSMAL ATRIAL FIBRILLATION) (HCC): ICD-10-CM

## 2020-12-15 PROCEDURE — G8419 CALC BMI OUT NRM PARAM NOF/U: HCPCS | Performed by: INTERNAL MEDICINE

## 2020-12-15 PROCEDURE — 1101F PT FALLS ASSESS-DOCD LE1/YR: CPT | Performed by: INTERNAL MEDICINE

## 2020-12-15 PROCEDURE — 99214 OFFICE O/P EST MOD 30 MIN: CPT | Performed by: INTERNAL MEDICINE

## 2020-12-15 PROCEDURE — G8427 DOCREV CUR MEDS BY ELIG CLIN: HCPCS | Performed by: INTERNAL MEDICINE

## 2020-12-15 PROCEDURE — G8510 SCR DEP NEG, NO PLAN REQD: HCPCS | Performed by: INTERNAL MEDICINE

## 2020-12-15 PROCEDURE — G0463 HOSPITAL OUTPT CLINIC VISIT: HCPCS | Performed by: INTERNAL MEDICINE

## 2020-12-15 PROCEDURE — G8536 NO DOC ELDER MAL SCRN: HCPCS | Performed by: INTERNAL MEDICINE

## 2020-12-15 RX ORDER — VITAMIN E 1000 UNIT
1 CAPSULE ORAL DAILY
COMMUNITY

## 2020-12-15 NOTE — PROGRESS NOTES
Florinda Restrepo is a 80 y.o. male who presents today for Heart Problem and Cholesterol Problem  . He has a history of   Patient Active Problem List   Diagnosis Code    OAB (overactive bladder) N32.81    Hyperlipidemia E78.5    History of melanoma Z85.820    Gastroesophageal reflux disease K21.9    Murmur R01.1    Essential tremor G25.0    Aortic valve stenosis I35.0    Coronary artery disease involving native heart without angina pectoris, unspecified vessel or lesion type I25.10    CAD (coronary artery disease) I25.10    CAD (coronary atherosclerotic disease) I25.10    AS (aortic stenosis) I35.0    S/P TAVR (transcatheter aortic valve replacement) Z95.2    PAF (paroxysmal atrial fibrillation) (HCC) I48.0   . Today patient is here for follow-up. [place] CAD/ s/p TAVR: Patient notes that overall his exercise tolerance has been stable. No anginal symptoms. PAfib: Patient currently on Eliquis. Off BB due to bradycardia. Essential tremor: Patient has remained on primidone. This is stable. Hyperlipidemia  On atorvastatin. Lab Results   Component Value Date/Time    Cholesterol, total 123 03/10/2020 09:05 AM    HDL Cholesterol 38 (L) 03/10/2020 09:05 AM    LDL, calculated 75 03/10/2020 09:05 AM    VLDL, calculated 10 03/10/2020 09:05 AM    Triglyceride 50 03/10/2020 09:05 AM       ROS  Review of Systems   Constitutional: Negative for chills, fever and weight loss. HENT: Negative for congestion and sore throat. Eyes: Negative for blurred vision, double vision and photophobia. Respiratory: Negative for cough and shortness of breath. Cardiovascular: Negative for chest pain, palpitations and leg swelling. Gastrointestinal: Negative for abdominal pain, constipation, diarrhea, heartburn, nausea and vomiting. Genitourinary: Negative for dysuria, frequency and urgency. Musculoskeletal: Negative for joint pain and myalgias. Skin: Negative for rash.    Neurological: Positive for tremors. Negative for headaches. Endo/Heme/Allergies: Does not bruise/bleed easily. Psychiatric/Behavioral: Negative for memory loss and suicidal ideas. Visit Vitals  /80 (BP 1 Location: Left arm, BP Patient Position: Sitting)   Pulse (!) 58   Temp 97.8 °F (36.6 °C) (Oral)   Resp 16   Ht 5' 8\" (1.727 m)   Wt 177 lb (80.3 kg)   SpO2 99%   BMI 26.91 kg/m²       Physical Exam  Constitutional:       Appearance: He is well-developed. He is not diaphoretic. HENT:      Head: Normocephalic and atraumatic. Eyes:      Pupils: Pupils are equal, round, and reactive to light. Cardiovascular:      Rate and Rhythm: Normal rate and regular rhythm. Heart sounds: No murmur. Pulmonary:      Effort: Pulmonary effort is normal. No respiratory distress. Breath sounds: Normal breath sounds. Musculoskeletal: Normal range of motion. Skin:     General: Skin is warm and dry. Neurological:      Mental Status: He is alert and oriented to person, place, and time. Psychiatric:         Behavior: Behavior normal.           Current Outpatient Medications   Medication Sig    ascorbic acid, vitamin C, (VITAMIN C) 1,000 mg tablet Take 1 Tab by mouth daily.  multivitamin with minerals (HAIR,SKIN AND NAILS PO) Take  by mouth.  Niaspan 500 mg tablet Take 1 Tab by mouth daily.  primidone (MYSOLINE) 250 mg tablet Take 1/2 tablet in the morning and 1 TABLET at bedtime    Eliquis 5 mg tablet TAKE 1 TABLET TWICE A DAY    atorvastatin (LIPITOR) 40 mg tablet Take 1 Tab by mouth daily for 360 days.  Detrol LA 4 mg ER capsule TAKE 1 CAPSULE DAILY    ipratropium (ATROVENT) 0.03 % nasal spray 2 Sprays by Both Nostrils route every twelve (12) hours.  acetaminophen (TYLENOL) 325 mg tablet Take 2 Tabs by mouth every four (4) hours as needed for Pain or Fever.  magnesium oxide (MAG-OX) 400 mg tablet Take 250 mg by mouth daily.     Vit C-Vit E-Copper-ZnOx-Lutein (PRESERVISION LUTEIN) 226 mg-200 unit -5 mg-0.8 mg cap Take  by mouth.  COQ10, UBIQUINOL, PO Take  by mouth.  cholecalciferol (VITAMIN D3) 1,000 unit cap Take  by mouth daily.  cyanocobalamin 1,000 mcg tablet Take 5,000 mcg by mouth daily.  folic acid (FOLVITE) 1 mg tablet Take  by mouth daily.  metoprolol tartrate (LOPRESSOR) 25 mg tablet TAKE 1 TABLET TWICE A DAY (Patient not taking: Reported on 10/27/2020)    omeprazole (PRILOSEC) 20 mg capsule Take 1 Cap by mouth daily. No current facility-administered medications for this visit. Past Medical History:   Diagnosis Date    GERD (gastroesophageal reflux disease)     Hyperactivity of bladder     Hypercholesterolemia     Macular degeneration disease     Skin cancer (melanoma) (HCC)     Tremor       Past Surgical History:   Procedure Laterality Date    HX HERNIA REPAIR      HX MOHS PROCEDURES        Social History     Tobacco Use    Smoking status: Never Smoker    Smokeless tobacco: Never Used   Substance Use Topics    Alcohol use: Yes     Alcohol/week: 5.0 standard drinks     Types: 5 Glasses of wine per week     Comment: beer or glass of wine nightly       Family History   Problem Relation Age of Onset    Cancer Father     Lung Cancer Father         smoker    Heart Disease Brother     Diabetes Brother     Diabetes Maternal Grandfather     Prostate Cancer Brother         Allergies   Allergen Reactions    Penicillins Hives        Assessment/Plan  Diagnoses and all orders for this visit:    1. Hyperlipidemia, unspecified hyperlipidemia type-repeat levels today. -     METABOLIC PANEL, COMPREHENSIVE; Future  -     CBC WITH AUTOMATED DIFF; Future  -     LIPID PANEL; Future    2. Essential tremor-stable    3. Coronary artery disease involving native coronary artery of native heart without angina pectoris-no anginal symptoms. Continue to be physically active  -     METABOLIC PANEL, COMPREHENSIVE; Future  -     CBC WITH AUTOMATED DIFF;  Future  -     LIPID PANEL; Future    4. PAF (paroxysmal atrial fibrillation) (HCC)-on anticoagulation. Follow-up with cardiology  -     METABOLIC PANEL, COMPREHENSIVE; Future  -     CBC WITH AUTOMATED DIFF; Future  -     LIPID PANEL; Future    5. S/P TAVR (transcatheter aortic valve replacement)-doing well. Agatha Stoll MD  12/15/2020    This note was created with the help of speech recognition software Chiqui Calvillo) and may contain some 'sound alike' errors.

## 2020-12-16 LAB
ALBUMIN SERPL-MCNC: 4 G/DL (ref 3.5–5)
ALBUMIN/GLOB SERPL: 1.4 {RATIO} (ref 1.1–2.2)
ALP SERPL-CCNC: 100 U/L (ref 45–117)
ALT SERPL-CCNC: 29 U/L (ref 12–78)
ANION GAP SERPL CALC-SCNC: 4 MMOL/L (ref 5–15)
AST SERPL-CCNC: 24 U/L (ref 15–37)
BASOPHILS # BLD: 0.1 K/UL (ref 0–0.1)
BASOPHILS NFR BLD: 1 % (ref 0–1)
BILIRUB SERPL-MCNC: 0.4 MG/DL (ref 0.2–1)
BUN SERPL-MCNC: 15 MG/DL (ref 6–20)
BUN/CREAT SERPL: 20 (ref 12–20)
CALCIUM SERPL-MCNC: 9 MG/DL (ref 8.5–10.1)
CHLORIDE SERPL-SCNC: 105 MMOL/L (ref 97–108)
CHOLEST SERPL-MCNC: 130 MG/DL
CO2 SERPL-SCNC: 30 MMOL/L (ref 21–32)
CREAT SERPL-MCNC: 0.74 MG/DL (ref 0.7–1.3)
DIFFERENTIAL METHOD BLD: ABNORMAL
EOSINOPHIL # BLD: 0.2 K/UL (ref 0–0.4)
EOSINOPHIL NFR BLD: 4 % (ref 0–7)
ERYTHROCYTE [DISTWIDTH] IN BLOOD BY AUTOMATED COUNT: 14.5 % (ref 11.5–14.5)
GLOBULIN SER CALC-MCNC: 2.9 G/DL (ref 2–4)
GLUCOSE SERPL-MCNC: 74 MG/DL (ref 65–100)
HCT VFR BLD AUTO: 48.3 % (ref 36.6–50.3)
HDLC SERPL-MCNC: 59 MG/DL
HDLC SERPL: 2.2 {RATIO} (ref 0–5)
HGB BLD-MCNC: 15.4 G/DL (ref 12.1–17)
IMM GRANULOCYTES # BLD AUTO: 0 K/UL (ref 0–0.04)
IMM GRANULOCYTES NFR BLD AUTO: 0 % (ref 0–0.5)
LDLC SERPL CALC-MCNC: 60 MG/DL (ref 0–100)
LIPID PROFILE,FLP: NORMAL
LYMPHOCYTES # BLD: 1.6 K/UL (ref 0.8–3.5)
LYMPHOCYTES NFR BLD: 28 % (ref 12–49)
MCH RBC QN AUTO: 29.8 PG (ref 26–34)
MCHC RBC AUTO-ENTMCNC: 31.9 G/DL (ref 30–36.5)
MCV RBC AUTO: 93.4 FL (ref 80–99)
MONOCYTES # BLD: 0.5 K/UL (ref 0–1)
MONOCYTES NFR BLD: 9 % (ref 5–13)
NEUTS SEG # BLD: 3.3 K/UL (ref 1.8–8)
NEUTS SEG NFR BLD: 58 % (ref 32–75)
NRBC # BLD: 0 K/UL (ref 0–0.01)
NRBC BLD-RTO: 0 PER 100 WBC
PLATELET # BLD AUTO: 103 K/UL (ref 150–400)
PMV BLD AUTO: 12.1 FL (ref 8.9–12.9)
POTASSIUM SERPL-SCNC: 3.9 MMOL/L (ref 3.5–5.1)
PROT SERPL-MCNC: 6.9 G/DL (ref 6.4–8.2)
RBC # BLD AUTO: 5.17 M/UL (ref 4.1–5.7)
SODIUM SERPL-SCNC: 139 MMOL/L (ref 136–145)
TRIGL SERPL-MCNC: 55 MG/DL (ref ?–150)
VLDLC SERPL CALC-MCNC: 11 MG/DL
WBC # BLD AUTO: 5.7 K/UL (ref 4.1–11.1)

## 2021-02-06 DIAGNOSIS — K21.9 GASTROESOPHAGEAL REFLUX DISEASE WITHOUT ESOPHAGITIS: ICD-10-CM

## 2021-02-07 RX ORDER — OMEPRAZOLE 20 MG/1
CAPSULE, DELAYED RELEASE ORAL
Qty: 90 CAP | Refills: 3 | Status: SHIPPED | OUTPATIENT
Start: 2021-02-07 | End: 2021-12-28

## 2021-03-29 ENCOUNTER — OFFICE VISIT (OUTPATIENT)
Dept: INTERNAL MEDICINE CLINIC | Age: 86
End: 2021-03-29
Payer: MEDICARE

## 2021-03-29 VITALS
DIASTOLIC BLOOD PRESSURE: 69 MMHG | HEART RATE: 57 BPM | BODY MASS INDEX: 27.22 KG/M2 | OXYGEN SATURATION: 98 % | HEIGHT: 68 IN | WEIGHT: 179.6 LBS | TEMPERATURE: 97.3 F | RESPIRATION RATE: 16 BRPM | SYSTOLIC BLOOD PRESSURE: 117 MMHG

## 2021-03-29 DIAGNOSIS — G25.0 ESSENTIAL TREMOR: ICD-10-CM

## 2021-03-29 DIAGNOSIS — T14.8XXA BRUISING: ICD-10-CM

## 2021-03-29 DIAGNOSIS — G89.29 CHRONIC RIGHT SHOULDER PAIN: Primary | ICD-10-CM

## 2021-03-29 DIAGNOSIS — M25.511 CHRONIC RIGHT SHOULDER PAIN: Primary | ICD-10-CM

## 2021-03-29 PROCEDURE — G8510 SCR DEP NEG, NO PLAN REQD: HCPCS | Performed by: INTERNAL MEDICINE

## 2021-03-29 PROCEDURE — G0463 HOSPITAL OUTPT CLINIC VISIT: HCPCS | Performed by: INTERNAL MEDICINE

## 2021-03-29 PROCEDURE — G8419 CALC BMI OUT NRM PARAM NOF/U: HCPCS | Performed by: INTERNAL MEDICINE

## 2021-03-29 PROCEDURE — G8536 NO DOC ELDER MAL SCRN: HCPCS | Performed by: INTERNAL MEDICINE

## 2021-03-29 PROCEDURE — 99214 OFFICE O/P EST MOD 30 MIN: CPT | Performed by: INTERNAL MEDICINE

## 2021-03-29 PROCEDURE — 1101F PT FALLS ASSESS-DOCD LE1/YR: CPT | Performed by: INTERNAL MEDICINE

## 2021-03-29 PROCEDURE — G8427 DOCREV CUR MEDS BY ELIG CLIN: HCPCS | Performed by: INTERNAL MEDICINE

## 2021-03-29 RX ORDER — PRIMIDONE 250 MG/1
TABLET ORAL
Qty: 225 TAB | Refills: 3 | Status: SHIPPED | OUTPATIENT
Start: 2021-03-29 | End: 2021-09-21 | Stop reason: SDUPTHER

## 2021-03-29 RX ORDER — DICLOFENAC SODIUM 10 MG/G
2 GEL TOPICAL 2 TIMES DAILY
Qty: 100 G | Refills: 1 | Status: SHIPPED | OUTPATIENT
Start: 2021-03-29 | End: 2021-10-14 | Stop reason: ALTCHOICE

## 2021-03-29 NOTE — PATIENT INSTRUCTIONS
Increase Primidone to one tablet in the AM and one at bedtime. Increase to 1.5 tablets at bedtime after one week.

## 2021-03-29 NOTE — PROGRESS NOTES
Valencia Bassett is a 80 y.o. male who presents today for Tremors (hand tremors)  . He has a history of   Patient Active Problem List   Diagnosis Code    OAB (overactive bladder) N32.81    Hyperlipidemia E78.5    History of melanoma Z85.820    Gastroesophageal reflux disease K21.9    Murmur R01.1    Essential tremor G25.0    Aortic valve stenosis I35.0    Coronary artery disease involving native heart without angina pectoris, unspecified vessel or lesion type I25.10    CAD (coronary artery disease) I25.10    CAD (coronary atherosclerotic disease) I25.10    AS (aortic stenosis) I35.0    S/P TAVR (transcatheter aortic valve replacement) Z95.2    PAF (paroxysmal atrial fibrillation) ([facility]) I48.0   . Today patient is here for an acute visit  . Essential tremors: Patient reports that recently has been having more essential tremors. He is on primidone at 375 mg/day. Reports stress has been stable. Denies a decline in physical activity. Patient has been unable to tolerate beta-blockers due to bradycardia. Thinner skin. Bruises easily. No change in products. Has chronic pain to R shoulder. No injury. This is been affecting him more especially when he reaches up to grab things. Denies any falls. Denies any swelling. ROS  Review of Systems   Constitutional: Negative for chills, fever and weight loss. HENT: Negative for congestion and sore throat. Eyes: Negative for blurred vision, double vision and photophobia. Respiratory: Negative for cough and shortness of breath. Cardiovascular: Negative for chest pain, palpitations and leg swelling. Gastrointestinal: Negative for abdominal pain, constipation, diarrhea, heartburn, nausea and vomiting. Genitourinary: Negative for dysuria, frequency and urgency. Musculoskeletal: Positive for joint pain. Negative for myalgias. Skin: Negative for rash. Bruising more easily   Neurological: Positive for tremors.  Negative for headaches. Endo/Heme/Allergies: Does not bruise/bleed easily. Psychiatric/Behavioral: Negative for memory loss and suicidal ideas. Visit Vitals  /69 (BP 1 Location: Left upper arm, BP Patient Position: Sitting, BP Cuff Size: Adult)   Pulse (!) 57   Temp 97.3 °F (36.3 °C) (Oral)   Resp 16   Ht 5' 8\" (1.727 m)   Wt 179 lb 9.6 oz (81.5 kg)   SpO2 98%   BMI 27.31 kg/m²       Physical Exam  Constitutional:       Appearance: He is well-developed. He is not diaphoretic. HENT:      Head: Normocephalic and atraumatic. Right Ear: Tympanic membrane normal.      Left Ear: Tympanic membrane normal.   Eyes:      Pupils: Pupils are equal, round, and reactive to light. Cardiovascular:      Rate and Rhythm: Normal rate and regular rhythm. Heart sounds: No murmur. Pulmonary:      Effort: Pulmonary effort is normal. No respiratory distress. Breath sounds: Normal breath sounds. Abdominal:      General: Abdomen is flat. Palpations: Abdomen is soft. Musculoskeletal: Normal range of motion. Comments: Pain to anterior shoulder. This is exacerbated by resisted internal rotation as well as abduction above the shoulder. Skin:     General: Skin is warm and dry. Neurological:      Mental Status: He is alert and oriented to person, place, and time. Comments: Tremor present. Psychiatric:         Behavior: Behavior normal.           Current Outpatient Medications   Medication Sig    primidone (MYSOLINE) 250 mg tablet Take 1tablet in the morning and 1 1/2 TABLET at bedtime    diclofenac (VOLTAREN) 1 % gel Apply 2 g to affected area two (2) times a day.  omeprazole (PRILOSEC) 20 mg capsule TAKE 1 CAPSULE DAILY    ascorbic acid, vitamin C, (VITAMIN C) 1,000 mg tablet Take 1 Tab by mouth daily.  multivitamin with minerals (HAIR,SKIN AND NAILS PO) Take  by mouth.  Niaspan 500 mg tablet Take 1 Tab by mouth daily.     Eliquis 5 mg tablet TAKE 1 TABLET TWICE A DAY    atorvastatin (LIPITOR) 40 mg tablet Take 1 Tab by mouth daily for 360 days.  Detrol LA 4 mg ER capsule TAKE 1 CAPSULE DAILY    ipratropium (ATROVENT) 0.03 % nasal spray 2 Sprays by Both Nostrils route every twelve (12) hours.  acetaminophen (TYLENOL) 325 mg tablet Take 2 Tabs by mouth every four (4) hours as needed for Pain or Fever.  magnesium oxide (MAG-OX) 400 mg tablet Take 250 mg by mouth daily.  Vit C-Vit E-Copper-ZnOx-Lutein (PRESERVISION LUTEIN) 226 mg-200 unit -5 mg-0.8 mg cap Take  by mouth.  COQ10, UBIQUINOL, PO Take  by mouth.  cholecalciferol (VITAMIN D3) 1,000 unit cap Take  by mouth daily.  cyanocobalamin 1,000 mcg tablet Take 5,000 mcg by mouth daily.  folic acid (FOLVITE) 1 mg tablet Take  by mouth daily. No current facility-administered medications for this visit. Past Medical History:   Diagnosis Date    GERD (gastroesophageal reflux disease)     Hyperactivity of bladder     Hypercholesterolemia     Macular degeneration disease     Skin cancer (melanoma) (HCC)     Tremor       Past Surgical History:   Procedure Laterality Date    HX HERNIA REPAIR      HX MOHS PROCEDURES        Social History     Tobacco Use    Smoking status: Never Smoker    Smokeless tobacco: Never Used   Substance Use Topics    Alcohol use: Yes     Alcohol/week: 5.0 standard drinks     Types: 5 Glasses of wine per week     Comment: beer or glass of wine nightly       Family History   Problem Relation Age of Onset    Cancer Father     Lung Cancer Father         smoker    Heart Disease Brother     Diabetes Brother     Diabetes Maternal Grandfather     Prostate Cancer Brother         Allergies   Allergen Reactions    Penicillins Hives        Assessment/Plan  Diagnoses and all orders for this visit:    1. Chronic right shoulder pain-patient to try topical diclofenac. If this does not improve suggest seeing orthopedist.  -     diclofenac (VOLTAREN) 1 % gel;  Apply 2 g to affected area two (2) times a day. -     REFERRAL TO ORTHOPEDIC SURGERY    2. Essential tremor-no new stressors. No other focal neurological deficit. He will slowly increase his primidone dose to a goal of 1 tablet in the morning and one half at bedtime. He will follow up with us in 4 weeks. -     primidone (MYSOLINE) 250 mg tablet; Take 1tablet in the morning and 1 1/2 TABLET at bedtime    3. Bruising-skin chronically thin. Patient is to avoid injury. Try vitamin E E hydrating lotion. Yamil Rodriguez MD  3/29/2021    This note was created with the help of speech recognition software Gwendolyn Guillen) and may contain some 'sound alike' errors.

## 2021-04-15 RX ORDER — NIACIN 500 MG/1
TABLET, EXTENDED RELEASE ORAL
Qty: 90 TAB | Refills: 3 | Status: SHIPPED | OUTPATIENT
Start: 2021-04-15 | End: 2022-04-11

## 2021-04-27 ENCOUNTER — OFFICE VISIT (OUTPATIENT)
Dept: CARDIOLOGY CLINIC | Age: 86
End: 2021-04-27
Payer: MEDICARE

## 2021-04-27 VITALS
WEIGHT: 178.4 LBS | OXYGEN SATURATION: 93 % | SYSTOLIC BLOOD PRESSURE: 108 MMHG | HEART RATE: 86 BPM | DIASTOLIC BLOOD PRESSURE: 70 MMHG | HEIGHT: 68 IN | BODY MASS INDEX: 27.04 KG/M2

## 2021-04-27 DIAGNOSIS — Z95.2 S/P TAVR (TRANSCATHETER AORTIC VALVE REPLACEMENT): ICD-10-CM

## 2021-04-27 DIAGNOSIS — E78.5 HYPERLIPIDEMIA, UNSPECIFIED HYPERLIPIDEMIA TYPE: ICD-10-CM

## 2021-04-27 DIAGNOSIS — G25.0 ESSENTIAL TREMOR: ICD-10-CM

## 2021-04-27 DIAGNOSIS — I25.10 CORONARY ARTERY DISEASE INVOLVING NATIVE CORONARY ARTERY OF NATIVE HEART WITHOUT ANGINA PECTORIS: Primary | ICD-10-CM

## 2021-04-27 DIAGNOSIS — I48.0 PAF (PAROXYSMAL ATRIAL FIBRILLATION) (HCC): ICD-10-CM

## 2021-04-27 PROCEDURE — 99214 OFFICE O/P EST MOD 30 MIN: CPT | Performed by: INTERNAL MEDICINE

## 2021-04-27 PROCEDURE — G8536 NO DOC ELDER MAL SCRN: HCPCS | Performed by: INTERNAL MEDICINE

## 2021-04-27 PROCEDURE — G8427 DOCREV CUR MEDS BY ELIG CLIN: HCPCS | Performed by: INTERNAL MEDICINE

## 2021-04-27 PROCEDURE — 1101F PT FALLS ASSESS-DOCD LE1/YR: CPT | Performed by: INTERNAL MEDICINE

## 2021-04-27 PROCEDURE — G0463 HOSPITAL OUTPT CLINIC VISIT: HCPCS | Performed by: INTERNAL MEDICINE

## 2021-04-27 PROCEDURE — G8419 CALC BMI OUT NRM PARAM NOF/U: HCPCS | Performed by: INTERNAL MEDICINE

## 2021-04-27 PROCEDURE — G8432 DEP SCR NOT DOC, RNG: HCPCS | Performed by: INTERNAL MEDICINE

## 2021-04-27 NOTE — PROGRESS NOTES
Chief Complaint   Patient presents with    Follow-up    Coronary Artery Disease    Irregular Heart Beat     PAF       Visit Vitals  /70 (BP 1 Location: Left upper arm, BP Patient Position: Sitting)   Pulse 86   Ht 5' 8\" (1.727 m)   Wt 178 lb 6.4 oz (80.9 kg)   SpO2 93%   BMI 27.13 kg/m²       Chest pain denied  SOB - denied  Dizziness denied  Swelling/Edema - denied  Recent hospital visit denied  Refills denied

## 2021-04-27 NOTE — PROGRESS NOTES
Hanna Mclean MD    [id number] [place], [address] [facility]    Office [phone],[facility] [phone]            Dear Dr Marry Sarkar,    I had the pleasure of seeing Mr. Jassi Bay in the office today. Assessment:  Severe AS- S/p TAVR  CAD - s/p LAD stent  PAF  HLD  Hx of GERD  Sinus bradycardia      Plan:     Not on B blockers sec to bradycardia. HR in 50-60's off B blockers  On Eliquis  Aggressive cardiovascular risk factor modification. F/u 6 mths/earlier prn          Medication Side Effects and Warnings were discussed with patient: yes  Patient Labs were reviewed and or requested:  yes  Patient Past Records were reviewed and or requested: yes    I appreciate the opportunity to be involved in . Please see note below for details. Please do not hesitate to contact us with questions or concerns. Hanna Mclean MD    Cardiac Testing/ Procedures: A. Cardiac Cath/PCI:12/4/19 ( Dr Wilber Mccullough) Findings:  1)Severe mid LAD calcified disease  2)S/p PCI with 3 by 38 mm Xience EFFIE placement following Rotational atherectomy with 1.75 mm [place]. Post dilatation with 3.25 mm Bballoon at 20-24 KYRA. Good final result. Moderate distal disease deferred.     Access: Right radial--no issues     11/6/19 R Brachial vein access - 7 F sheath  R Radial access - 6 F sheath     Calcified coronaries  RCA - 3DRC; LCA - JL4  Codominant     L Main: Large; MLI     LAD: Prox - large; Mid/Distal Med; Ostial 30%; Mid - Ca+++; 80% ( haziness prob sec to Ca+); Another mid lesion - 70% ; Small to med D1- Prox 30%;  Small  D2; Large tortuous septal     LCflex: Med to large; MLI; OM1 - Med to large; Prox 50%     RCA: Med to large; Prox/Mid - MLI; Distal at bifurcation 60-70%- small PDA/PLB     LVEDP: valve not crossed     LVEF: not assessed     No significant gradient across aortic valve.           RHC findings:     RAPm= 4      mmHg  RVSP= 19    mmHg  PAPm= 14       mmHg  PCWPm=7    mmHg  CO= 6.02       l/min  CI=3.08     Specimens Removed : None     Complications: None     Closure Device: TR    B.ECHO/SHARAN:  7/28/20 · LV: Normal cavity size and systolic function (ejection fraction normal). Mildly increased wall thickness. Calculated left ventricular ejection fraction is 61%. Biplane method used to measure ejection fraction. Mild (grade 1) left ventricular diastolic dysfunction. · LA: Moderately dilated left atrium. · AV: Prosthetic aortic valve. Aortic valve mean gradient is 9.9 mmHg. There is a 26 mm KIM prosthetic aortic valve from prior TAVR procedure. Mild perivalvular aortic valve regurgitation is present. · MV: Mitral valve thickening. Mild mitral valve regurgitation is present. · TV: Mild tricuspid valve regurgitation is present. TR gradient = 41 mmHg; unable to assess IVC for RAP    1/14/20 Normal cavity size and systolic function (ejection fraction normal). Mild concentric hypertrophy. Calculated left ventricular ejection fraction is 63%. Biplane method used to measure ejection fraction. No regional wall motion abnormality noted. Mild (grade 1) left ventricular diastolic dysfunction. · Prosthetic aortic valve. There is a 26 mm Watson KIM prosthetic aortic valve from prior TAVR procedure. Prosthesis is normal. Trace paravalvular leak is present. Gradients are appropriate for this valve. Mitral valve thickening. Mild mitral annular calcification. Mild mitral valve regurgitation is present. 10/23/19 Left Ventricle: Normal cavity size and systolic function (ejection fraction normal). Mildly increased wall thickness. Estimated left ventricular ejection fraction is 56 - 60%. No regional wall motion abnormality noted. Mild (grade 1) left ventricular diastolic dysfunction. · Mitral Valve: Mitral annular calcification. Mild mitral valve regurgitation is present. · Left Atrium: Dilated left atrium. · Tricuspid Valve: Mild tricuspid valve regurgitation is present.   · Aortic Valve: Severe aortic valve sclerosis with reduced excursion. Aortic valve leaflet calcification present with reduced excursion. Severe aortic valve stenosis is present. Mild to moderate aortic valve regurgitation is present. Aortic Valve Systolic Mean Gradient 56 mm Hg; AV peak gradient ( pedhoff) -74 mm Hg    C.StressNuclear/Stress ECHO/Stress test:    D.Vascular: 11/5/19 Carotid doppler 1-59% bilat ICA    E. EP: Event monitor-7-day 1/24/2020to  1/30/2020-0 critical, 0 serious and 2 stable events. Sinus bradycardia 1/27/2020-2:02 PM 38 bpm.-Auto detected. F. Miscellaneous: 12/20/19 1. Angiography of the ascending aorta and aortoiliac bifurcation  2. Temporary transvenous pacemaker insertion  3. Left heart catheterization  4. Implantation of a 26 mm Watson S3 transcatheter aortic valve via the right transfemoral approach  5. 14F right?femoral artery access with Perclose pre-closure / 6F left femoral vein access with manual compression / 6F left femoral artery access with Perclose closure    Subjective:    Patient states that he has been doing well. He is walking in his neighborhood - slowing down. No chest pain/edema/palpitations/syncope. ROS:  (bold if positive, if negative)           Medications before admission:    Current Outpatient Medications   Medication Sig Dispense    niacin ER (NIASPAN) 500 mg tablet TAKE 1 TABLET DAILY 90 Tab    primidone (MYSOLINE) 250 mg tablet Take 1tablet in the morning and 1 1/2 TABLET at bedtime 225 Tab    diclofenac (VOLTAREN) 1 % gel Apply 2 g to affected area two (2) times a day. 100 g    omeprazole (PRILOSEC) 20 mg capsule TAKE 1 CAPSULE DAILY 90 Cap    ascorbic acid, vitamin C, (VITAMIN C) 1,000 mg tablet Take 1 Tab by mouth daily.  multivitamin with minerals (HAIR,SKIN AND NAILS PO) Take  by mouth.  Eliquis 5 mg tablet TAKE 1 TABLET TWICE A  Tab    atorvastatin (LIPITOR) 40 mg tablet Take 1 Tab by mouth daily for 360 days.  90 Tab    Detrol LA 4 mg ER capsule TAKE 1 CAPSULE DAILY 90 Cap    ipratropium (ATROVENT) 0.03 % nasal spray 2 Sprays by Both Nostrils route every twelve (12) hours. 30 mL    acetaminophen (TYLENOL) 325 mg tablet Take 2 Tabs by mouth every four (4) hours as needed for Pain or Fever. 60 Tab    magnesium oxide (MAG-OX) 400 mg tablet Take 250 mg by mouth daily.  Vit C-Vit E-Copper-ZnOx-Lutein (PRESERVISION LUTEIN) 226 mg-200 unit -5 mg-0.8 mg cap Take  by mouth.  COQ10, UBIQUINOL, PO Take  by mouth.  cholecalciferol (VITAMIN D3) 1,000 unit cap Take  by mouth daily.  cyanocobalamin 1,000 mcg tablet Take 5,000 mcg by mouth daily.  folic acid (FOLVITE) 1 mg tablet Take  by mouth daily. No current facility-administered medications for this visit. Family History of CAD:    Yes    Social History:  Current  Smoker No    Physical Exam:  Visit Vitals  /70 (BP 1 Location: Left upper arm, BP Patient Position: Sitting)   Pulse 86   Ht 5' 8\" (1.727 m)   Wt 178 lb 6.4 oz (80.9 kg)   SpO2 93%   BMI 27.13 kg/m²        Gen:    Well-developed, well-nourished, in no acute distress  HEENT:  Pink conjunctivae, hearing intact to voice, moist mucous membranes  Neck:  Supple,No JVD, No Carotid Bruit, Thyroid- non tender  Resp:  No accessory muscle use, Clear breath sounds, No rales or rhonchi  Card:   Regular Rate,Rythm,Normal S1, S2,2/6 sys murmur+,No rubs or gallop. No thrills. Abd:  Soft,  normoactive bowel sounds are present,   MSK:   No cyanosis  Skin:   No rashes or ulcers  Neuro:  moving all four extremities, no focal deficit, follows commands appropriately  Psych:  Good insight, oriented to person, place and time, alert, Nml Affect  LE: No edema  Vascular: Radial Pulses 2+ and symmetric      EKG:        LABS: LDL 75 3/2020        ATTENTION:   This medical record was transcribed using an electronic medical records/speech recognition system.   Although proofread, it may and can contain electronic, spelling and other errors. Corrections may be executed at a later time. Please feel free to contact us for any clarifications as needed.       Surinder Cesar MD

## 2021-04-27 NOTE — LETTER
4/27/2021    Patient: Heriberto Jackson   YOB: 1934   Date of Visit: 4/27/2021     Citlalli Victor MD  [facility] [address]  [address]  [address] [facility]  [place] [employer]    Dear Citlalli Victor MD,      Thank you for referring Mr. Pacheco Chery to [facility] for evaluation. My notes for this consultation are attached. If you have questions, please do not hesitate to call me. I look forward to following your patient along with you.       Sincerely,    Desire Yuan MD

## 2021-04-29 ENCOUNTER — OFFICE VISIT (OUTPATIENT)
Dept: INTERNAL MEDICINE CLINIC | Age: 86
End: 2021-04-29
Payer: MEDICARE

## 2021-04-29 VITALS
RESPIRATION RATE: 16 BRPM | HEIGHT: 68 IN | SYSTOLIC BLOOD PRESSURE: 115 MMHG | WEIGHT: 178.8 LBS | HEART RATE: 58 BPM | BODY MASS INDEX: 27.1 KG/M2 | TEMPERATURE: 97.9 F | OXYGEN SATURATION: 95 % | DIASTOLIC BLOOD PRESSURE: 73 MMHG

## 2021-04-29 DIAGNOSIS — G89.29 CHRONIC RIGHT SHOULDER PAIN: ICD-10-CM

## 2021-04-29 DIAGNOSIS — G25.0 ESSENTIAL TREMOR: Primary | ICD-10-CM

## 2021-04-29 DIAGNOSIS — D69.6 THROMBOCYTOPENIA (HCC): ICD-10-CM

## 2021-04-29 DIAGNOSIS — M25.511 CHRONIC RIGHT SHOULDER PAIN: ICD-10-CM

## 2021-04-29 DIAGNOSIS — I48.0 PAF (PAROXYSMAL ATRIAL FIBRILLATION) (HCC): ICD-10-CM

## 2021-04-29 LAB
ANION GAP SERPL CALC-SCNC: 6 MMOL/L (ref 5–15)
BASOPHILS # BLD: 0 K/UL (ref 0–0.1)
BASOPHILS NFR BLD: 1 % (ref 0–1)
BUN SERPL-MCNC: 23 MG/DL (ref 6–20)
BUN/CREAT SERPL: 32 (ref 12–20)
CALCIUM SERPL-MCNC: 9.2 MG/DL (ref 8.5–10.1)
CHLORIDE SERPL-SCNC: 102 MMOL/L (ref 97–108)
CO2 SERPL-SCNC: 30 MMOL/L (ref 21–32)
CREAT SERPL-MCNC: 0.73 MG/DL (ref 0.7–1.3)
DIFFERENTIAL METHOD BLD: ABNORMAL
EOSINOPHIL # BLD: 0.2 K/UL (ref 0–0.4)
EOSINOPHIL NFR BLD: 4 % (ref 0–7)
ERYTHROCYTE [DISTWIDTH] IN BLOOD BY AUTOMATED COUNT: 14 % (ref 11.5–14.5)
GLUCOSE SERPL-MCNC: 87 MG/DL (ref 65–100)
HCT VFR BLD AUTO: 46.7 % (ref 36.6–50.3)
HGB BLD-MCNC: 15.1 G/DL (ref 12.1–17)
IMM GRANULOCYTES # BLD AUTO: 0 K/UL (ref 0–0.04)
IMM GRANULOCYTES NFR BLD AUTO: 0 % (ref 0–0.5)
LYMPHOCYTES # BLD: 1.9 K/UL (ref 0.8–3.5)
LYMPHOCYTES NFR BLD: 30 % (ref 12–49)
MCH RBC QN AUTO: 30.4 PG (ref 26–34)
MCHC RBC AUTO-ENTMCNC: 32.3 G/DL (ref 30–36.5)
MCV RBC AUTO: 94 FL (ref 80–99)
MONOCYTES # BLD: 0.5 K/UL (ref 0–1)
MONOCYTES NFR BLD: 8 % (ref 5–13)
NEUTS SEG # BLD: 3.6 K/UL (ref 1.8–8)
NEUTS SEG NFR BLD: 57 % (ref 32–75)
NRBC # BLD: 0 K/UL (ref 0–0.01)
NRBC BLD-RTO: 0 PER 100 WBC
PLATELET # BLD AUTO: 104 K/UL (ref 150–400)
PMV BLD AUTO: 12.1 FL (ref 8.9–12.9)
POTASSIUM SERPL-SCNC: 4.1 MMOL/L (ref 3.5–5.1)
RBC # BLD AUTO: 4.97 M/UL (ref 4.1–5.7)
SODIUM SERPL-SCNC: 138 MMOL/L (ref 136–145)
WBC # BLD AUTO: 6.4 K/UL (ref 4.1–11.1)

## 2021-04-29 PROCEDURE — G0463 HOSPITAL OUTPT CLINIC VISIT: HCPCS | Performed by: INTERNAL MEDICINE

## 2021-04-29 PROCEDURE — 99214 OFFICE O/P EST MOD 30 MIN: CPT | Performed by: INTERNAL MEDICINE

## 2021-04-29 PROCEDURE — G8432 DEP SCR NOT DOC, RNG: HCPCS | Performed by: INTERNAL MEDICINE

## 2021-04-29 PROCEDURE — G8536 NO DOC ELDER MAL SCRN: HCPCS | Performed by: INTERNAL MEDICINE

## 2021-04-29 PROCEDURE — G8427 DOCREV CUR MEDS BY ELIG CLIN: HCPCS | Performed by: INTERNAL MEDICINE

## 2021-04-29 PROCEDURE — G8419 CALC BMI OUT NRM PARAM NOF/U: HCPCS | Performed by: INTERNAL MEDICINE

## 2021-04-29 PROCEDURE — 1101F PT FALLS ASSESS-DOCD LE1/YR: CPT | Performed by: INTERNAL MEDICINE

## 2021-04-29 NOTE — PROGRESS NOTES
Angelia Spatz is a 80 y.o. male who presents today for Follow-up (tremors) and Skin Exam (rash over nose)  . He has a history of   Patient Active Problem List   Diagnosis Code    OAB (overactive bladder) N32.81    Hyperlipidemia E78.5    History of melanoma Z85.820    Gastroesophageal reflux disease K21.9    Murmur R01.1    Essential tremor G25.0    Aortic valve stenosis I35.0    Coronary artery disease involving native heart without angina pectoris, unspecified vessel or lesion type I25.10    CAD (coronary artery disease) I25.10    CAD (coronary atherosclerotic disease) I25.10    AS (aortic stenosis) I35.0    S/P TAVR (transcatheter aortic valve replacement) Z95.2    PAF (paroxysmal atrial fibrillation) (HCC) I48.0   . Today patient is here for follow-up  . he does not have other concerns. ET: At last visit his tremors were increased. We discussed increasing his primidone dose slowly to a goal of 250 mg in the morning and 1/2 tablets at bedtime. Since he reports continued symptoms. .     Shoulder Pain: Patient was seen by orthopedist.  He did receive a shot in his shoulder. He reports that since this has greatly improved. Prabhakarnixon Britnye Tang. On [address]. Not tolerant of BB due to pulse. We reviewed his downtrending platelets. Patient reports that he is always been told he has low platelets and has always bruised easily. We will repeat these levels today. ROS  Review of Systems   Constitutional: Negative for chills, fever, malaise/fatigue and weight loss. HENT: Negative for congestion and sore throat. Eyes: Negative for blurred vision, double vision and photophobia. Respiratory: Negative for cough and shortness of breath. Cardiovascular: Negative for chest pain, palpitations and leg swelling. Gastrointestinal: Negative for abdominal pain, constipation, diarrhea, heartburn, nausea and vomiting. Genitourinary: Negative for dysuria, frequency and urgency.    Musculoskeletal: Negative for joint pain and myalgias. Skin: Negative for rash. Neurological: Positive for tremors. Negative for headaches. Endo/Heme/Allergies: Bruises/bleeds easily. Psychiatric/Behavioral: Negative for memory loss and suicidal ideas. Visit Vitals  /73 (BP 1 Location: Left upper arm, BP Patient Position: Sitting, BP Cuff Size: Adult)   Pulse (!) 58   Temp 97.9 °F (36.6 °C) (Oral)   Resp 16   Ht 5' 8\" (1.727 m)   Wt 178 lb 12.8 oz (81.1 kg)   SpO2 95%   BMI 27.19 kg/m²       Physical Exam  Constitutional:       Appearance: He is well-developed. He is not diaphoretic. HENT:      Head: Normocephalic and atraumatic. Right Ear: Tympanic membrane normal.      Left Ear: Tympanic membrane normal.   Eyes:      Pupils: Pupils are equal, round, and reactive to light. Cardiovascular:      Rate and Rhythm: Normal rate and regular rhythm. Heart sounds: Murmur present. Pulmonary:      Effort: Pulmonary effort is normal. No respiratory distress. Breath sounds: Normal breath sounds. Abdominal:      General: Abdomen is flat. Palpations: Abdomen is soft. Musculoskeletal: Normal range of motion. Skin:     General: Skin is warm and dry. Neurological:      Mental Status: He is alert and oriented to person, place, and time. Comments: Essential tremor   Psychiatric:         Behavior: Behavior normal.           Current Outpatient Medications   Medication Sig    niacin ER (NIASPAN) 500 mg tablet TAKE 1 TABLET DAILY    primidone (MYSOLINE) 250 mg tablet Take 1tablet in the morning and 1 1/2 TABLET at bedtime    diclofenac (VOLTAREN) 1 % gel Apply 2 g to affected area two (2) times a day.  omeprazole (PRILOSEC) 20 mg capsule TAKE 1 CAPSULE DAILY    ascorbic acid, vitamin C, (VITAMIN C) 1,000 mg tablet Take 1 Tab by mouth daily.  multivitamin with minerals (HAIR,SKIN AND NAILS PO) Take  by mouth.     Eliquis 5 mg tablet TAKE 1 TABLET TWICE A DAY    atorvastatin (LIPITOR) 40 mg tablet Take 1 Tab by mouth daily for 360 days.  Detrol LA 4 mg ER capsule TAKE 1 CAPSULE DAILY    ipratropium (ATROVENT) 0.03 % nasal spray 2 Sprays by Both Nostrils route every twelve (12) hours.  acetaminophen (TYLENOL) 325 mg tablet Take 2 Tabs by mouth every four (4) hours as needed for Pain or Fever.  magnesium oxide (MAG-OX) 400 mg tablet Take 250 mg by mouth daily.  Vit C-Vit E-Copper-ZnOx-Lutein (PRESERVISION LUTEIN) 226 mg-200 unit -5 mg-0.8 mg cap Take  by mouth.  COQ10, UBIQUINOL, PO Take  by mouth.  cholecalciferol (VITAMIN D3) 1,000 unit cap Take  by mouth daily.  cyanocobalamin 1,000 mcg tablet Take 5,000 mcg by mouth daily.  folic acid (FOLVITE) 1 mg tablet Take  by mouth daily. No current facility-administered medications for this visit. Past Medical History:   Diagnosis Date    GERD (gastroesophageal reflux disease)     Hyperactivity of bladder     Hypercholesterolemia     Macular degeneration disease     Skin cancer (melanoma) (HCC)     Tremor       Past Surgical History:   Procedure Laterality Date    HX HERNIA REPAIR      HX MOHS PROCEDURES        Social History     Tobacco Use    Smoking status: Never Smoker    Smokeless tobacco: Never Used   Substance Use Topics    Alcohol use: Yes     Alcohol/week: 5.0 standard drinks     Types: 5 Glasses of wine per week     Comment: beer or glass of wine nightly       Family History   Problem Relation Age of Onset    Cancer Father     Lung Cancer Father         smoker    Heart Disease Brother     Diabetes Brother     Diabetes Maternal Grandfather     Prostate Cancer Brother         Allergies   Allergen Reactions    Penicillins Hives        Assessment/Plan  Diagnoses and all orders for this visit:    1. Essential tremor-no better with higher dose of primidone. Patient still quite affected by tremor. Intolerant to beta-blockers due to pulse.   Will send to the neurology  -     METABOLIC PANEL, BASIC; Future    2. Chronic right shoulder pain-much better after injection  -     METABOLIC PANEL, BASIC; Future    3. PAF (paroxysmal atrial fibrillation) (HCC)-remains on oral anticoagulation    4. Thrombocytopenia (HCC)-downtrending over time. Historically reports that he has had a low platelets. We will repeat today  -     CBC WITH AUTOMATED DIFF; Future            Collette MD Janine  4/29/2021    This note was created with the help of speech recognition software Matthew Serrano) and may contain some 'sound alike' errors.

## 2021-04-30 RX ORDER — TOLTERODINE TARTRATE 4 MG/1
CAPSULE, EXTENDED RELEASE ORAL
Qty: 90 CAP | Refills: 3 | Status: SHIPPED | OUTPATIENT
Start: 2021-04-30 | End: 2022-04-25 | Stop reason: SDUPTHER

## 2021-05-18 ENCOUNTER — OFFICE VISIT (OUTPATIENT)
Dept: NEUROLOGY | Age: 86
End: 2021-05-18
Payer: MEDICARE

## 2021-05-18 VITALS
OXYGEN SATURATION: 97 % | SYSTOLIC BLOOD PRESSURE: 128 MMHG | HEIGHT: 67 IN | HEART RATE: 54 BPM | RESPIRATION RATE: 14 BRPM | WEIGHT: 178 LBS | DIASTOLIC BLOOD PRESSURE: 70 MMHG | BODY MASS INDEX: 27.94 KG/M2

## 2021-05-18 DIAGNOSIS — G25.0 ESSENTIAL TREMOR: Primary | ICD-10-CM

## 2021-05-18 PROCEDURE — G8427 DOCREV CUR MEDS BY ELIG CLIN: HCPCS | Performed by: PSYCHIATRY & NEUROLOGY

## 2021-05-18 PROCEDURE — G8510 SCR DEP NEG, NO PLAN REQD: HCPCS | Performed by: PSYCHIATRY & NEUROLOGY

## 2021-05-18 PROCEDURE — 99204 OFFICE O/P NEW MOD 45 MIN: CPT | Performed by: PSYCHIATRY & NEUROLOGY

## 2021-05-18 PROCEDURE — G8419 CALC BMI OUT NRM PARAM NOF/U: HCPCS | Performed by: PSYCHIATRY & NEUROLOGY

## 2021-05-18 PROCEDURE — G8536 NO DOC ELDER MAL SCRN: HCPCS | Performed by: PSYCHIATRY & NEUROLOGY

## 2021-05-18 PROCEDURE — 1101F PT FALLS ASSESS-DOCD LE1/YR: CPT | Performed by: PSYCHIATRY & NEUROLOGY

## 2021-05-18 RX ORDER — TOPIRAMATE 25 MG/1
TABLET ORAL
Qty: 42 TAB | Refills: 0 | Status: SHIPPED | OUTPATIENT
Start: 2021-05-18 | End: 2021-10-14 | Stop reason: ALTCHOICE

## 2021-05-18 RX ORDER — TOPIRAMATE 100 MG/1
100 TABLET, FILM COATED ORAL
Qty: 30 TAB | Refills: 1 | Status: SHIPPED | OUTPATIENT
Start: 2021-05-18 | End: 2021-10-14 | Stop reason: ALTCHOICE

## 2021-05-18 NOTE — PROGRESS NOTES
[facility] and [address] at [facility] at [address] [facility], [address] [place] [id number] [address] [facility], [address] [place] [address]  [phone] Office  [phone] Facsimile           Referring: Jessica Falk MD      Chief Complaint   Patient presents with    New Patient    Tremors     had tremors for yrs, rx'd primidone and referred to neuro     80year-old right-handed gentleman who presents today accompanied by his wife for neurologic evaluation regarding tremor. He notes he has had tremor for a number of years worsening over the last several years. In the past he was tried on beta-blocker that was ineffective. He has been started on Mysoline he has been on that for about a year and a half with a recent dose increase but he is not noticed much difference. He believes he may have had some initial response but he is not noticed any difference with the change in dose. He tolerates it well. He has tremor of his hands particularly bothersome when he is doing something. He has difficulty eating. Has difficulty writing. Has to hold a glass with 2 hands in order to drink. Wine or an alcoholic beverage will make it better. No family history of tremor. No rest tremor. No focal weakness. Record review finds office visit with Dr. Joe Izaguirre dated April 29, 2021 where he was following up for tremor and a rash on his nose. At that time he was being treated for essential tremor and he was noting increase in his tremor. He was not getting any better with higher doses of Mysoline. He cannot use beta blockers due to bradycardia.     Laboratory analysis April 29, 6862  Metabolic panel unremarkable  CBC unremarkable  Past Medical History:   Diagnosis Date    GERD (gastroesophageal reflux disease)     Hyperactivity of bladder     Hypercholesterolemia     Macular degeneration disease     Skin cancer (melanoma) ([facility] [address]     Tremor      Past Surgical History:   Procedure Laterality Date    HX HERNIA REPAIR      HX MOHS PROCEDURES         Current Outpatient Medications   Medication Sig Dispense Refill    Detrol LA 4 mg ER capsule TAKE 1 CAPSULE DAILY 90 Cap 3    niacin ER (NIASPAN) 500 mg tablet TAKE 1 TABLET DAILY 90 Tab 3    primidone (MYSOLINE) 250 mg tablet Take 1tablet in the morning and 1 1/2 TABLET at bedtime 225 Tab 3    diclofenac (VOLTAREN) 1 % gel Apply 2 g to affected area two (2) times a day. 100 g 1    omeprazole (PRILOSEC) 20 mg capsule TAKE 1 CAPSULE DAILY 90 Cap 3    ascorbic acid, vitamin C, (VITAMIN C) 1,000 mg tablet Take 1 Tab by mouth daily.  multivitamin with minerals (HAIR,SKIN AND NAILS PO) Take  by mouth.  Eliquis 5 mg tablet TAKE 1 TABLET TWICE A  Tab 3    atorvastatin (LIPITOR) 40 mg tablet Take 1 Tab by mouth daily for 360 days. 90 Tab 3    ipratropium (ATROVENT) 0.03 % nasal spray 2 Sprays by Both Nostrils route every twelve (12) hours. 30 mL 1    acetaminophen (TYLENOL) 325 mg tablet Take 2 Tabs by mouth every four (4) hours as needed for Pain or Fever. 60 Tab 2    magnesium oxide (MAG-OX) 400 mg tablet Take 250 mg by mouth daily.  Vit C-Vit E-Copper-ZnOx-Lutein (PRESERVISION LUTEIN) 226 mg-200 unit -5 mg-0.8 mg cap Take  by mouth.  COQ10, UBIQUINOL, PO Take  by mouth.  cholecalciferol (VITAMIN D3) 1,000 unit cap Take  by mouth daily.  cyanocobalamin 1,000 mcg tablet Take 5,000 mcg by mouth daily.  folic acid (FOLVITE) 1 mg tablet Take  by mouth daily. Allergies   Allergen Reactions    Penicillins Hives       Social History     Tobacco Use    Smoking status: Never Smoker    Smokeless tobacco: Never Used   Substance Use Topics    Alcohol use:  Yes     Alcohol/week: 5.0 standard drinks     Types: 5 Glasses of wine per week     Comment: beer or glass of wine nightly     Drug use: Never       Family History   Problem Relation Age of Onset    Cancer Father     Lung Cancer Father         smoker    Heart Disease Brother     Diabetes Brother     Diabetes Maternal Grandfather     Prostate Cancer Brother        Review of Systems  Pertinent positives and negatives as noted with remainder of comprehensive review negative      Examination  Visit Vitals  /70 (BP 1 Location: Right arm, BP Patient Position: Sitting, BP Cuff Size: Adult)   Pulse (!) 54   Resp 14   Ht 5' 7\" (1.702 m)   Wt 80.7 kg (178 lb)   SpO2 97%   BMI 27.88 kg/m²   He is a very pleasant gentleman. He is engaging and interactive. His dress grooming and affect are appropriate. He has no scleral icterus. Oropharynx clear moist.  No edema. He is awake alert oriented and conversant with normal speech and language. Normal cognitive function. Cranial nerves intact 2-12. No nystagmus. No pronation and no drift. He has postural tremor of the outstretched hands as well as some head tremor. He has intention on finger-nose-finger. Length is full in the upper and lower extremities in all muscle groups to direct testing. No ataxia. Reflexes symmetrical.  No pathologic reflex. Impression/Plan  Essential tremor worsening despite treatment with Mysoline  Discussed options including increasing Mysoline versus adding something for synergy  We will add Topamax titrating in a customary fashion to a dose of 100 mg nightly along with the Mysoline  Discussed potential side effects including taste disturbance, paresthesia, weight loss    Follow in about 7-8 weeks    Deidra Staley MD          This note was created using voice recognition software. Despite editing, there may be syntax errors.

## 2021-05-18 NOTE — PATIENT INSTRUCTIONS
RESULT POLICY If we have ordered testing for you, know that; \"NO NEWS IS GOOD NEWS! \" It is our policy that we no longer call patients with results, nor do we  give test results over the phone. We schedule follow up appointments so that your results can be discussed in person. This allows you to address any questions you have regarding the results. If you choose to go to an imaging center outside of [facility], it is your responsibility to bring imaging report and disc to follow up appointment. If something of concern is revealed on your test, we will contact you to discuss the matter and if needed schedule a sooner follow up appointment. Additionally, results may be found by using the My Chart feature and one of our patient service representatives at the  can give you instructions on how to access this feature to utilize our electronic medical record system. Thank you for your understanding. PRESCRIPTION REFILL POLICY [facility] Statement to Patients April 1, 2014 In an effort to ensure the large volume of patient prescription refills is processed in the most efficient and expeditious manner, we are asking our patients to assist us by calling your Pharmacy for all prescription refills, this will include also your  Mail Order Pharmacy. The pharmacy will contact our office electronically to continue the refill process. Please do not wait until the last minute to call your pharmacy. We need at least 48 hours (2days) to fill prescriptions. We also encourage you to call your pharmacy before going to  your prescription to make sure it is ready. With regard to controlled substance prescription refill requests (narcotic refills) that need to be picked up at our office, we ask your cooperation by providing us with at least 72 hours (3days) notice that you will need a refill.  
 
We will not refill narcotic prescription refill requests after 4:00pm on any weekday, Monday through Thursday, or after 2:00pm on Fridays, or on the weekends. We encourage everyone to explore another way of getting your prescription refill request processed using [employer], our patient web portal through our electronic medical record system. [employer] is an efficient and effective way to communicate your medication request directly to the office and  downloadable as an keisha on your smart phone . [employer] also features a review functionality that allows you to view your medication list as well as leave messages for your physician. Are you ready to get connected? If so please review the attatched instructions or speak to any of our staff to get you set up right away! Thank you so much for your cooperation. Should you have any questions please contact our Practice Administrator.

## 2021-05-18 NOTE — PROGRESS NOTES
Chief Complaint   Patient presents with    New Patient    Tremors     had tremors for yrs, rx'd primidone and referred to neuro     Tried betablocker yrs ago, unsuccessful

## 2021-06-01 DIAGNOSIS — I25.10 CORONARY ARTERY DISEASE INVOLVING NATIVE CORONARY ARTERY OF NATIVE HEART WITHOUT ANGINA PECTORIS: ICD-10-CM

## 2021-06-01 DIAGNOSIS — E78.5 HYPERLIPIDEMIA, UNSPECIFIED HYPERLIPIDEMIA TYPE: ICD-10-CM

## 2021-06-01 RX ORDER — ATORVASTATIN CALCIUM 40 MG/1
TABLET, FILM COATED ORAL
Qty: 90 TABLET | Refills: 3 | Status: SHIPPED | OUTPATIENT
Start: 2021-06-01 | End: 2022-08-09

## 2021-06-11 ENCOUNTER — TELEPHONE (OUTPATIENT)
Dept: INTERNAL MEDICINE CLINIC | Age: 86
End: 2021-06-11

## 2021-06-11 NOTE — TELEPHONE ENCOUNTER
Patient would like all appointment reminders to be sent to his home phone instead of mobile. PSR is unsure how to fix this. Please look into this.

## 2021-07-08 ENCOUNTER — PATIENT MESSAGE (OUTPATIENT)
Dept: INTERNAL MEDICINE CLINIC | Age: 86
End: 2021-07-08

## 2021-07-09 ENCOUNTER — TELEPHONE (OUTPATIENT)
Dept: INTERNAL MEDICINE CLINIC | Age: 86
End: 2021-07-09

## 2021-07-09 NOTE — TELEPHONE ENCOUNTER
Patient called to state the prescription for eliquis was sent in to the wrong pharmacy yesterday. [facility]

## 2021-07-09 NOTE — TELEPHONE ENCOUNTER
----- Message from Ruthie Noland sent at 7/8/2021  4:30 PM EDT -----  Regarding: Prescription Question  Contact: [phone]  please send my Omnidrone  [facility]     Thank you     Geoff Cannon

## 2021-07-13 ENCOUNTER — OFFICE VISIT (OUTPATIENT)
Dept: NEUROLOGY | Age: 86
End: 2021-07-13
Payer: MEDICARE

## 2021-07-13 VITALS
BODY MASS INDEX: 27.88 KG/M2 | SYSTOLIC BLOOD PRESSURE: 123 MMHG | DIASTOLIC BLOOD PRESSURE: 80 MMHG | WEIGHT: 178 LBS | OXYGEN SATURATION: 96 % | HEART RATE: 59 BPM | RESPIRATION RATE: 14 BRPM

## 2021-07-13 DIAGNOSIS — G25.0 ESSENTIAL TREMOR: Primary | ICD-10-CM

## 2021-07-13 DIAGNOSIS — R41.3 MEMORY LOSS: ICD-10-CM

## 2021-07-13 PROCEDURE — 1101F PT FALLS ASSESS-DOCD LE1/YR: CPT | Performed by: PSYCHIATRY & NEUROLOGY

## 2021-07-13 PROCEDURE — G8419 CALC BMI OUT NRM PARAM NOF/U: HCPCS | Performed by: PSYCHIATRY & NEUROLOGY

## 2021-07-13 PROCEDURE — G8536 NO DOC ELDER MAL SCRN: HCPCS | Performed by: PSYCHIATRY & NEUROLOGY

## 2021-07-13 PROCEDURE — G8427 DOCREV CUR MEDS BY ELIG CLIN: HCPCS | Performed by: PSYCHIATRY & NEUROLOGY

## 2021-07-13 PROCEDURE — 99214 OFFICE O/P EST MOD 30 MIN: CPT | Performed by: PSYCHIATRY & NEUROLOGY

## 2021-07-13 PROCEDURE — G8510 SCR DEP NEG, NO PLAN REQD: HCPCS | Performed by: PSYCHIATRY & NEUROLOGY

## 2021-07-13 NOTE — PATIENT INSTRUCTIONS
Increase Mysoline 250 mg tablets 2 tablets at night and 1 in the morning for 2 weeks. If the tremor is better stay at that dose.   If the tremor is not better increase the dose up to 2 tablets at night and 1-1/2 tablets in the morning    We will have you scheduled for formal memory testing

## 2021-07-13 NOTE — PROGRESS NOTES
Chief Complaint   Patient presents with    Tremors     no change unable to take the topamax, was having gait issues

## 2021-07-13 NOTE — PROGRESS NOTES
[facility] and [address] at [facility] at [address] [facility] [address], [address] [facility] [address] [facility], [address] [place] [address]   [phone]              Chief Complaint   Patient presents with    Tremors     no change unable to take the topamax, was having gait issues     Current Outpatient Medications   Medication Sig Dispense Refill    apixaban (Eliquis) 5 mg tablet TAKE 1 TABLET TWICE A  Tablet 1    atorvastatin (LIPITOR) 40 mg tablet TAKE 1 TABLET DAILY 90 Tablet 3    Detrol LA 4 mg ER capsule TAKE 1 CAPSULE DAILY 90 Cap 3    niacin ER (NIASPAN) 500 mg tablet TAKE 1 TABLET DAILY 90 Tab 3    primidone (MYSOLINE) 250 mg tablet Take 1tablet in the morning and 1 1/2 TABLET at bedtime 225 Tab 3    diclofenac (VOLTAREN) 1 % gel Apply 2 g to affected area two (2) times a day. 100 g 1    omeprazole (PRILOSEC) 20 mg capsule TAKE 1 CAPSULE DAILY 90 Cap 3    ascorbic acid, vitamin C, (VITAMIN C) 1,000 mg tablet Take 1 Tab by mouth daily.  multivitamin with minerals (HAIR,SKIN AND NAILS PO) Take  by mouth.  ipratropium (ATROVENT) 0.03 % nasal spray 2 Sprays by Both Nostrils route every twelve (12) hours. 30 mL 1    acetaminophen (TYLENOL) 325 mg tablet Take 2 Tabs by mouth every four (4) hours as needed for Pain or Fever. 60 Tab 2    magnesium oxide (MAG-OX) 400 mg tablet Take 250 mg by mouth daily.  COQ10, UBIQUINOL, PO Take  by mouth.  cholecalciferol (VITAMIN D3) 1,000 unit cap Take  by mouth daily.  cyanocobalamin 1,000 mcg tablet Take 5,000 mcg by mouth daily.  folic acid (FOLVITE) 1 mg tablet Take  by mouth daily.  topiramate (TOPAMAX) 25 mg tablet 25 mg nightly for 1 week then, 50 mg nightly for 1 week then, 75 mg nightly for 1 week then, 100mg nightly thereafter.  TITRATION Rx DO NOT REFILL (Patient not taking: Reported on 7/13/2021) 42 Tab 0    topiramate (Topamax) 100 mg tablet Take 1 Tab by mouth nightly. (Patient not taking: Reported on 7/13/2021) 30 Tab 1    Vit C-Vit E-Copper-ZnOx-Lutein (PRESERVISION LUTEIN) 226 mg-200 unit -5 mg-0.8 mg cap Take  by mouth. Allergies   Allergen Reactions    Penicillins Hives     Social History     Tobacco Use    Smoking status: Never Smoker    Smokeless tobacco: Never Used   Vaping Use    Vaping Use: Never used   Substance Use Topics    Alcohol use: Yes     Alcohol/week: 5.0 standard drinks     Types: 5 Glasses of wine per week     Comment: beer or glass of wine nightly     Drug use: Never   59-year-old gentleman returns today for follow-up essential tremor. We tried him on Topamax last visit and he had intolerable side effect. He was imbalance and had trouble walking. That is gotten better since he stopped the Topamax. Still with tremor of his head and his hands. Hands are worse this morning. Tolerates Mysoline. His wife is with him and she notes that he is having increasing difficulty with short-term memory. He says that he sometimes cannot remember what he had for breakfast but he remembers long-term things. Nothing dangerous. Examination  Visit Vitals  /80 (BP 1 Location: Left upper arm, BP Patient Position: Sitting, BP Cuff Size: Adult)   Pulse (!) 59   Resp 14   Wt 80.7 kg (178 lb)   SpO2 96%   BMI 27.88 kg/m²   Pleasant gentleman. Awake alert oriented and conversant. Head tremor present. No real tremor of the outstretched hands. No rest tremor. No cogwheeling.       Impression/Plan  Essential tremor with side effect of Topamax  Inability to use beta-blockers  He tolerates Mysoline so we will increase that to a dose of 2 tablets in the evening and 1 in the morning for 2 weeks and then he can escalate to 2 tablets in the evening and 1-1/2 in the morning if need be  Formal neurocognitive eval for the memory concerns and we discussed this could be normal aging versus mild cognitive impairment versus early stage Alzheimer's type dementia  Follow-up in about 8 to 10 weeks    Jak Calderon MD        This note was created using voice recognition software. Despite editing, there may be syntax errors. Negative

## 2021-08-03 PROBLEM — I25.10 CAD (CORONARY ARTERY DISEASE): Status: RESOLVED | Noted: 2019-12-04 | Resolved: 2021-08-03

## 2021-08-18 ENCOUNTER — TELEPHONE (OUTPATIENT)
Dept: CARDIOLOGY CLINIC | Age: 86
End: 2021-08-18

## 2021-08-18 NOTE — TELEPHONE ENCOUNTER
[facility] 56 calling to receive a fax on a antibiotic recommended for the patient to take before having a dental procedure scheduled for 8/25/21      [address] [place] [address]  [phone]  Fax [phone]

## 2021-08-25 ENCOUNTER — TELEPHONE (OUTPATIENT)
Dept: CARDIOLOGY CLINIC | Age: 86
End: 2021-08-25

## 2021-08-25 NOTE — TELEPHONE ENCOUNTER
Spoke with Patrick Guzman with [facility] 56 calling to know if patient needs antibiotic therapy prior to any dental work. Patient is scheduled for a filling in the next couple of weeks and would like to know if patient can proceed with treatment.       Please advise.        Renetta    [phone]  Fax [phone]

## 2021-08-26 ENCOUNTER — TELEPHONE (OUTPATIENT)
Dept: CARDIOLOGY CLINIC | Age: 86
End: 2021-08-26

## 2021-08-26 RX ORDER — AZITHROMYCIN 500 MG/1
TABLET, FILM COATED ORAL
Qty: 3 TABLET | Refills: 0 | Status: SHIPPED | OUTPATIENT
Start: 2021-08-26 | End: 2022-04-12 | Stop reason: ALTCHOICE

## 2021-08-26 NOTE — TELEPHONE ENCOUNTER
Informed patient I had talk with Tavia Mckenzie at his dental office and she will be calling the patient.

## 2021-08-26 NOTE — TELEPHONE ENCOUNTER
Called Sarina Guo with [facility] 56 advised per Dr Gaby Zaragoza patient does need antibiotic treatment prior to dental procedure. He is allergic to PCN recommend   Azithromycin 500 mg PO 30-60 minutes prior to procedure. Verbalized understanding she will call us a month prior to patient's appointment which is 3/2022.

## 2021-08-26 NOTE — TELEPHONE ENCOUNTER
Patient calling to ask if he will need an antibiotic as he will need dental work done that has not yet been scheduled, please advise        [phone]

## 2021-09-21 ENCOUNTER — OFFICE VISIT (OUTPATIENT)
Dept: NEUROLOGY | Age: 86
End: 2021-09-21
Payer: MEDICARE

## 2021-09-21 VITALS
OXYGEN SATURATION: 97 % | SYSTOLIC BLOOD PRESSURE: 126 MMHG | DIASTOLIC BLOOD PRESSURE: 74 MMHG | RESPIRATION RATE: 14 BRPM | WEIGHT: 178 LBS | BODY MASS INDEX: 27.88 KG/M2 | HEART RATE: 81 BPM

## 2021-09-21 DIAGNOSIS — G25.0 ESSENTIAL TREMOR: Primary | ICD-10-CM

## 2021-09-21 DIAGNOSIS — R26.89 IMBALANCE: ICD-10-CM

## 2021-09-21 PROCEDURE — G8510 SCR DEP NEG, NO PLAN REQD: HCPCS | Performed by: PSYCHIATRY & NEUROLOGY

## 2021-09-21 PROCEDURE — G8419 CALC BMI OUT NRM PARAM NOF/U: HCPCS | Performed by: PSYCHIATRY & NEUROLOGY

## 2021-09-21 PROCEDURE — 1101F PT FALLS ASSESS-DOCD LE1/YR: CPT | Performed by: PSYCHIATRY & NEUROLOGY

## 2021-09-21 PROCEDURE — G8427 DOCREV CUR MEDS BY ELIG CLIN: HCPCS | Performed by: PSYCHIATRY & NEUROLOGY

## 2021-09-21 PROCEDURE — 99214 OFFICE O/P EST MOD 30 MIN: CPT | Performed by: PSYCHIATRY & NEUROLOGY

## 2021-09-21 PROCEDURE — G8536 NO DOC ELDER MAL SCRN: HCPCS | Performed by: PSYCHIATRY & NEUROLOGY

## 2021-09-21 RX ORDER — PRIMIDONE 250 MG/1
TABLET ORAL
Qty: 120 TABLET | Refills: 3 | Status: SHIPPED | OUTPATIENT
Start: 2021-09-21 | End: 2022-03-03

## 2021-09-21 NOTE — PROGRESS NOTES
Chief Complaint   Patient presents with    Tremors     morning dose of primidone works well, wears off around supper which makes it difficult to eat/read

## 2021-09-21 NOTE — PROGRESS NOTES
[facility] and [address] at [facility] at [address], [address] [place] [address] [address] [facility], [address] [place] [address]   [phone]              Chief Complaint   Patient presents with    Tremors     morning dose of primidone works well, wears off around supper which makes it difficult to eat/read     Current Outpatient Medications   Medication Sig Dispense Refill    apixaban (Eliquis) 5 mg tablet TAKE 1 TABLET TWICE A  Tablet 1    atorvastatin (LIPITOR) 40 mg tablet TAKE 1 TABLET DAILY 90 Tablet 3    Detrol LA 4 mg ER capsule TAKE 1 CAPSULE DAILY 90 Cap 3    niacin ER (NIASPAN) 500 mg tablet TAKE 1 TABLET DAILY 90 Tab 3    primidone (MYSOLINE) 250 mg tablet Take 1tablet in the morning and 1 1/2 TABLET at bedtime 225 Tab 3    omeprazole (PRILOSEC) 20 mg capsule TAKE 1 CAPSULE DAILY 90 Cap 3    ascorbic acid, vitamin C, (VITAMIN C) 1,000 mg tablet Take 1 Tab by mouth daily.  multivitamin with minerals (HAIR,SKIN AND NAILS PO) Take  by mouth.  ipratropium (ATROVENT) 0.03 % nasal spray 2 Sprays by Both Nostrils route every twelve (12) hours. 30 mL 1    acetaminophen (TYLENOL) 325 mg tablet Take 2 Tabs by mouth every four (4) hours as needed for Pain or Fever. 60 Tab 2    magnesium oxide (MAG-OX) 400 mg tablet Take 250 mg by mouth daily.  Vit C-Vit E-Copper-ZnOx-Lutein (PRESERVISION LUTEIN) 226 mg-200 unit -5 mg-0.8 mg cap Take  by mouth.  COQ10, UBIQUINOL, PO Take  by mouth.  cholecalciferol (VITAMIN D3) 1,000 unit cap Take  by mouth daily.  cyanocobalamin 1,000 mcg tablet Take 5,000 mcg by mouth daily.  folic acid (FOLVITE) 1 mg tablet Take  by mouth daily.       azithromycin (ZITHROMAX) 500 mg tab Take 1 TAB by mouth 30-60 minutes prior to procedure 3 Tablet 0    topiramate (TOPAMAX) 25 mg tablet 25 mg nightly for 1 week then, 50 mg nightly for 1 week then, 75 mg nightly for 1 week then, 100mg nightly thereafter. TITRATION Rx DO NOT REFILL (Patient not taking: Reported on 7/13/2021) 42 Tab 0    topiramate (Topamax) 100 mg tablet Take 1 Tab by mouth nightly. (Patient not taking: Reported on 9/21/2021) 30 Tab 1    diclofenac (VOLTAREN) 1 % gel Apply 2 g to affected area two (2) times a day. (Patient not taking: Reported on 9/21/2021) 100 g 1      Allergies   Allergen Reactions    Penicillins Hives     Social History     Tobacco Use    Smoking status: Never Smoker    Smokeless tobacco: Never Used   Vaping Use    Vaping Use: Never used   Substance Use Topics    Alcohol use: Yes     Alcohol/week: 5.0 standard drinks     Types: 5 Glasses of wine per week     Comment: beer or glass of wine nightly     Drug use: Never     43-year-old gentleman returns today for follow-up. He has essential tremor and cognitive difficulty. He had side effects with Topamax and is unable to use beta-blockers. We increased his Mysoline. Presently he has his neuropsychological evaluation is set for December. His Mysoline dose right now is 1.5 tablets in the morning and 2 at night. He says during the day he does well particularly with eating breakfast and lunch. When it comes to dinnertime tremor has worsened again and he spills food. He is having some imbalance. No falls. This is a new complaint      Examination  Visit Vitals  /74 (BP 1 Location: Left upper arm, BP Patient Position: Sitting, BP Cuff Size: Adult)   Pulse 81   Resp 14   Wt 80.7 kg (178 lb)   SpO2 97%   BMI 27.88 kg/m²     Very pleasant gentleman. Awake alert and oriented. Normal speech and language. Discusses current events. He is fluent. He has no nystagmus. No pronation or drift. Minimal postural tremor of the outstretched hands with intention on finger-nose-finger right greater than left.     Impression/Plan  Essential tremor not optimized  Increase Mysoline to a dose of 1-1/2 tablets in the morning, half tablet at lunch and 2 tablets at bedtime    New complaint of imbalance  We will send her to physical therapy    Follow-up in about 3 months    Lori Ho MD        This note was created using voice recognition software. Despite editing, there may be syntax errors.

## 2021-10-14 ENCOUNTER — OFFICE VISIT (OUTPATIENT)
Dept: INTERNAL MEDICINE CLINIC | Age: 86
End: 2021-10-14
Payer: MEDICARE

## 2021-10-14 VITALS
DIASTOLIC BLOOD PRESSURE: 75 MMHG | TEMPERATURE: 97.4 F | SYSTOLIC BLOOD PRESSURE: 122 MMHG | WEIGHT: 171.4 LBS | BODY MASS INDEX: 26.9 KG/M2 | HEART RATE: 54 BPM | HEIGHT: 67 IN | OXYGEN SATURATION: 96 % | RESPIRATION RATE: 14 BRPM

## 2021-10-14 DIAGNOSIS — G25.0 ESSENTIAL TREMOR: ICD-10-CM

## 2021-10-14 DIAGNOSIS — Z00.00 MEDICARE ANNUAL WELLNESS VISIT, SUBSEQUENT: Primary | ICD-10-CM

## 2021-10-14 DIAGNOSIS — E78.5 HYPERLIPIDEMIA, UNSPECIFIED HYPERLIPIDEMIA TYPE: ICD-10-CM

## 2021-10-14 DIAGNOSIS — Z95.2 S/P TAVR (TRANSCATHETER AORTIC VALVE REPLACEMENT): ICD-10-CM

## 2021-10-14 DIAGNOSIS — Z23 NEEDS FLU SHOT: ICD-10-CM

## 2021-10-14 DIAGNOSIS — I48.0 PAF (PAROXYSMAL ATRIAL FIBRILLATION) (HCC): ICD-10-CM

## 2021-10-14 DIAGNOSIS — I25.10 CORONARY ARTERY DISEASE INVOLVING NATIVE CORONARY ARTERY OF NATIVE HEART WITHOUT ANGINA PECTORIS: ICD-10-CM

## 2021-10-14 DIAGNOSIS — Z71.89 ADVANCED DIRECTIVES, COUNSELING/DISCUSSION: ICD-10-CM

## 2021-10-14 PROCEDURE — G8536 NO DOC ELDER MAL SCRN: HCPCS | Performed by: INTERNAL MEDICINE

## 2021-10-14 PROCEDURE — G8427 DOCREV CUR MEDS BY ELIG CLIN: HCPCS | Performed by: INTERNAL MEDICINE

## 2021-10-14 PROCEDURE — G8419 CALC BMI OUT NRM PARAM NOF/U: HCPCS | Performed by: INTERNAL MEDICINE

## 2021-10-14 PROCEDURE — 1101F PT FALLS ASSESS-DOCD LE1/YR: CPT | Performed by: INTERNAL MEDICINE

## 2021-10-14 PROCEDURE — 90694 VACC AIIV4 NO PRSRV 0.5ML IM: CPT | Performed by: INTERNAL MEDICINE

## 2021-10-14 PROCEDURE — G0439 PPPS, SUBSEQ VISIT: HCPCS | Performed by: INTERNAL MEDICINE

## 2021-10-14 PROCEDURE — G8510 SCR DEP NEG, NO PLAN REQD: HCPCS | Performed by: INTERNAL MEDICINE

## 2021-10-14 NOTE — ACP (ADVANCE CARE PLANNING)
Advance Care Planning     General Advance Care Planning (ACP) Conversation      Date of Conversation: 10/14/2021  Conducted with: Patient with Decision Making Capacity    Healthcare Decision Maker:   No healthcare decision makers have been documented. Click here to complete [address] including selection of the Healthcare Decision Maker Relationship (ie \"Primary\")    Today we documented Decision Maker(s). The patient will provide ACP documents. Content/Action Overview:    Has ACP document(s) NOT on file - requested patient to provide      Length of Voluntary ACP Conversation in minutes:  <16 minutes (Non-Billable)    Stephanie Berg MD

## 2021-10-14 NOTE — PROGRESS NOTES
Evie Connell is a 80 y.o. male who presents today for Annual Wellness Visit (RM19// pt is presenting today with wife for AWV is having PT for balance)  . He has a history of   Patient Active Problem List   Diagnosis Code    OAB (overactive bladder) N32.81    Hyperlipidemia E78.5    History of melanoma Z85.820    Gastroesophageal reflux disease K21.9    Murmur R01.1    Essential tremor G25.0    Aortic valve stenosis I35.0    Coronary artery disease involving native heart without angina pectoris, unspecified vessel or lesion type I25.10    CAD (coronary atherosclerotic disease) I25.10    AS (aortic stenosis) I35.0    S/P TAVR (transcatheter aortic valve replacement) Z95.2    PAF (paroxysmal atrial fibrillation) (HCC) I48.0   . Today patient is here for follow-up. Essential tremor: Patient has seen a neurologist.  They have titrated up his dose of primidone as the Topamax was causing him side effects. He reports that this is overall stable. Doing PT at [facility]. This is helped his ability to exercise and walk. They have set him up for neuropsych testing later this year. S/p TAVR/Afib/CAD: From a cardiac standpoint, patient reports that he is doing well. Blood pressures well controlled. He remains on Lipitor as well as a daily aspirin. We will repeat his lipids today. He remains on anticoagulation. Reports that her breathing overall is stable. Exercise tolerance is also stable. Health maintenance hx includes:  Exercise: moderately active. Form of exercise: walking regularly. Social: Patient recently moved here from [facility] [place] lives at home with his wife. From [place] originally.   Denies any smoking history. Rola Carlson is independent of all activities of daily living. Rola Carlson is retired [employer] through the [employer].    Two sons. 2 grandchildren, one great-grandchild  Social alcohol.  No significant smoking history.     Cancer screening:    Colon cancer screening:  N/A   Prostate cancer screening: PSA and/or MARY: N/A    Immunizations:     Immunization History   Administered Date(s) Administered    COVID-19, PFIZER, MRNA, LNP-S, PF, 30MCG/0.3ML DOSE 01/25/2021, 02/15/2021    Influenza High Dose Vaccine PF 10/04/2011, 11/02/2012, 10/15/2015, 10/19/2016, 11/15/2017, 09/25/2018    Influenza Vaccine 09/30/2010, 10/04/2011, 11/02/2012, 10/10/2013, 10/03/2014, 09/10/2020    Influenza Vaccine (Tri) Adjuvanted (>65 Yrs FLUAD TRI 45100) 10/16/2019    Influenza, High-dose, Quadrivalent (>65 Yrs Fluzone High Dose Quad 36086) 09/08/2020    Pneumococcal Conjugate (PCV-13) 05/26/2010, 04/06/2015    Pneumococcal Polysaccharide (PPSV-23) 01/01/2003, 02/01/2010, 04/06/2015    Tdap 06/23/2020    Zoster Recombinant 06/26/2020, 09/08/2020    Zoster Vaccine, Live 04/27/2009, 01/28/2014, 05/10/2017      Immunization status: UTD, he will get his booster for Pfizer vaccine soon we will give him his flu shot today    ,   ROS  Review of Systems   Constitutional: Negative for chills, fever and weight loss. HENT: Negative for congestion and sore throat. Eyes: Negative for blurred vision, double vision and photophobia. Respiratory: Negative for cough and shortness of breath. Cardiovascular: Negative for chest pain, palpitations and leg swelling. Gastrointestinal: Negative for abdominal pain, constipation, diarrhea, heartburn, nausea and vomiting. Genitourinary: Negative for dysuria, frequency and urgency. Musculoskeletal: Negative for joint pain and myalgias. Skin: Negative for rash. Neurological: Positive for tremors. Negative for dizziness, tingling and headaches. Endo/Heme/Allergies: Does not bruise/bleed easily. Psychiatric/Behavioral: Negative for depression, memory loss and suicidal ideas.         Recent signs of inattention       Visit Vitals  /75 (BP 1 Location: Left upper arm, BP Patient Position: Sitting, BP Cuff Size: Adult)   Pulse (!) 54   Temp 97.4 °F (36.3 °C) (Oral) Resp 14   Ht 5' 7\" (1.702 m)   Wt 171 lb 6.4 oz (77.7 kg)   SpO2 96%   BMI 26.85 kg/m²       Physical Exam  Constitutional:       Appearance: He is well-developed. He is not diaphoretic. HENT:      Head: Normocephalic and atraumatic. Right Ear: Tympanic membrane normal.      Left Ear: Tympanic membrane normal.   Eyes:      Pupils: Pupils are equal, round, and reactive to light. Cardiovascular:      Rate and Rhythm: Normal rate and regular rhythm. Heart sounds: No murmur heard. Pulmonary:      Effort: Pulmonary effort is normal. No respiratory distress. Breath sounds: Normal breath sounds. Abdominal:      General: Abdomen is flat. Musculoskeletal:         General: Normal range of motion. Skin:     General: Skin is warm and dry. Neurological:      Mental Status: He is alert and oriented to person, place, and time. Psychiatric:         Behavior: Behavior normal.           Current Outpatient Medications   Medication Sig    primidone (MYSOLINE) 250 mg tablet 1.5 tabs in am, 0.5 tab with lunch and 2 tabs qhs    apixaban (Eliquis) 5 mg tablet TAKE 1 TABLET TWICE A DAY    atorvastatin (LIPITOR) 40 mg tablet TAKE 1 TABLET DAILY    Detrol LA 4 mg ER capsule TAKE 1 CAPSULE DAILY    niacin ER (NIASPAN) 500 mg tablet TAKE 1 TABLET DAILY    omeprazole (PRILOSEC) 20 mg capsule TAKE 1 CAPSULE DAILY    ascorbic acid, vitamin C, (VITAMIN C) 1,000 mg tablet Take 1 Tab by mouth daily.  multivitamin with minerals (HAIR,SKIN AND NAILS PO) Take  by mouth.  ipratropium (ATROVENT) 0.03 % nasal spray 2 Sprays by Both Nostrils route every twelve (12) hours.  acetaminophen (TYLENOL) 325 mg tablet Take 2 Tabs by mouth every four (4) hours as needed for Pain or Fever.  magnesium oxide (MAG-OX) 400 mg tablet Take 250 mg by mouth daily.  Vit C-Vit E-Copper-ZnOx-Lutein (PRESERVISION LUTEIN) 226 mg-200 unit -5 mg-0.8 mg cap Take  by mouth.  COQ10, UBIQUINOL, PO Take  by mouth.     cholecalciferol (VITAMIN D3) 1,000 unit cap Take  by mouth daily.  cyanocobalamin 1,000 mcg tablet Take 5,000 mcg by mouth daily.  folic acid (FOLVITE) 1 mg tablet Take  by mouth daily.  azithromycin (ZITHROMAX) 500 mg tab Take 1 TAB by mouth 30-60 minutes prior to procedure    topiramate (TOPAMAX) 25 mg tablet 25 mg nightly for 1 week then, 50 mg nightly for 1 week then, 75 mg nightly for 1 week then, 100mg nightly thereafter. TITRATION Rx DO NOT REFILL (Patient not taking: Reported on 7/13/2021)    topiramate (Topamax) 100 mg tablet Take 1 Tab by mouth nightly. (Patient not taking: Reported on 9/21/2021)    diclofenac (VOLTAREN) 1 % gel Apply 2 g to affected area two (2) times a day. (Patient not taking: Reported on 9/21/2021)     No current facility-administered medications for this visit. Past Medical History:   Diagnosis Date    GERD (gastroesophageal reflux disease)     Hyperactivity of bladder     Hypercholesterolemia     Macular degeneration disease     Skin cancer (melanoma) (HCC)     Tremor       Past Surgical History:   Procedure Laterality Date    HX HERNIA REPAIR      HX MOHS PROCEDURES        Social History     Tobacco Use    Smoking status: Never Smoker    Smokeless tobacco: Never Used   Substance Use Topics    Alcohol use: Yes     Alcohol/week: 5.0 standard drinks     Types: 5 Glasses of wine per week     Comment: beer or glass of wine nightly       Family History   Problem Relation Age of Onset    Cancer Father     Lung Cancer Father         smoker    Heart Disease Brother     Diabetes Brother     Diabetes Maternal Grandfather     Prostate Cancer Brother         Allergies   Allergen Reactions    Penicillins Hives        Assessment/Plan  Diagnoses and all orders for this visit:    1. Medicare annual wellness visit, marni Boone was counseled on age-appropriate/ guideline-based risk prevention behaviors and screening for a 80y.o. year old   male . We also discussed adjustments in screening based on family history if necessary. Printed instructions for preventative screening guidelines and healthy behaviors given to patient with after visit summary. 2. Coronary artery disease involving native coronary artery of native heart without angina pectoris-lipids today  -     METABOLIC PANEL, COMPREHENSIVE; Future  -     LIPID PANEL; Future    3. Hyperlipidemia, unspecified hyperlipidemia type  -     METABOLIC PANEL, COMPREHENSIVE; Future  -     LIPID PANEL; Future    4. Essential tremor-working with neurology. Overall stable. Did not tolerate Topamax    5. PAF (paroxysmal atrial fibrillation) ([facility])-heart rate controlled and on oral anticoagulation exercise tolerance stable  -     CBC WITH AUTOMATED DIFF; Future    6. S/P TAVR (transcatheter aortic valve replacement)-     7. Advanced directives, counseling/discussion    8. Needs flu shot  -     FLU ([facility])            Denis Cassidy MD  10/14/2021    This note was created with the help of speech recognition software Kendal Meth) and may contain some 'sound alike' errors. This is the Subsequent Medicare Annual Wellness Exam, performed 12 months or more after the Initial AWV or the last Subsequent AWV    I have reviewed the patient's medical history in detail and updated the computerized patient record. Assessment/Plan   Education and counseling provided:  Are appropriate based on today's review and evaluation  End-of-Life planning (with patient's consent)  Influenza Vaccine  Cardiovascular screening blood test    1. Coronary artery disease involving native coronary artery of native heart without angina pectoris  2. Hyperlipidemia, unspecified hyperlipidemia type  3. Essential tremor  4. PAF (paroxysmal atrial fibrillation) ([facility] [address]  5. S/P TAVR (transcatheter aortic valve replacement)  6. Advanced directives, counseling/discussion  7.  Medicare annual wellness visit, subsequent       Depression Risk Factor Screening     3 most recent PHQ Screens 10/14/2021   Little interest or pleasure in doing things Not at all   Feeling down, depressed, irritable, or hopeless Not at all   Total Score PHQ 2 0   Trouble falling or staying asleep, or sleeping too much -   Feeling tired or having little energy -   Poor appetite, weight loss, or overeating -   Feeling bad about yourself - or that you are a failure or have let yourself or your family down -   Trouble concentrating on things such as school, work, reading, or watching TV -   Moving or speaking so slowly that other people could have noticed; or the opposite being so fidgety that others notice -   Thoughts of being better off dead, or hurting yourself in some way -   PHQ 9 Score -   How difficult have these problems made it for you to do your work, take care of your home and get along with others -       Alcohol Risk Screen    Do you average more than 1 drink per night or more than 7 drinks a week: No    In the past three months have you have had more than 4 drinks containing alcohol on one occasion: No        Functional Ability and Level of Safety    Hearing: Hearing is good. Activities of Daily Living: The home contains: no safety equipment. Patient does total self care      Ambulation: with no difficulty     Fall Risk:  Fall Risk Assessment, last 12 mths 10/14/2021   Able to walk? Yes   Fall in past 12 months? 0   Do you feel unsteady?  0   Are you worried about falling 0      Abuse Screen:  Patient is not abused       Cognitive Screening    Has your family/caregiver stated any concerns about your memory: yes - Is scheduled for neuropsychological testing       Health Maintenance Due     Health Maintenance Due   Topic Date Due    Flu Vaccine (1) 09/01/2021       Patient Care Team   Patient Care Team:  Catarina Acosta MD as PCP - General (Internal Medicine)  Catarina Acosta MD as PCP - [facility] Empaneled Provider  Miranda Gomez MD as Physician (Ophthalmology)  Chalo Rondon MD (Cardiology)    History     Patient Active Problem List   Diagnosis Code    OAB (overactive bladder) N32.81    Hyperlipidemia E78.5    History of melanoma Z85.820    Gastroesophageal reflux disease K21.9    Murmur R01.1    Essential tremor G25.0    Aortic valve stenosis I35.0    Coronary artery disease involving native heart without angina pectoris, unspecified vessel or lesion type I25.10    CAD (coronary atherosclerotic disease) I25.10    AS (aortic stenosis) I35.0    S/P TAVR (transcatheter aortic valve replacement) Z95.2    PAF (paroxysmal atrial fibrillation) ([facility]) I48.0     Past Medical History:   Diagnosis Date    GERD (gastroesophageal reflux disease)     Hyperactivity of bladder     Hypercholesterolemia     Macular degeneration disease     Skin cancer (melanoma) ([facility] [address]     Tremor       Past Surgical History:   Procedure Laterality Date    HX HERNIA REPAIR      HX MOHS PROCEDURES       Current Outpatient Medications   Medication Sig Dispense Refill    primidone (MYSOLINE) 250 mg tablet 1.5 tabs in am, 0.5 tab with lunch and 2 tabs qhs 120 Tablet 3    apixaban (Eliquis) 5 mg tablet TAKE 1 TABLET TWICE A  Tablet 1    atorvastatin (LIPITOR) 40 mg tablet TAKE 1 TABLET DAILY 90 Tablet 3    Detrol LA 4 mg ER capsule TAKE 1 CAPSULE DAILY 90 Cap 3    niacin ER (NIASPAN) 500 mg tablet TAKE 1 TABLET DAILY 90 Tab 3    omeprazole (PRILOSEC) 20 mg capsule TAKE 1 CAPSULE DAILY 90 Cap 3    ascorbic acid, vitamin C, (VITAMIN C) 1,000 mg tablet Take 1 Tab by mouth daily.  multivitamin with minerals (HAIR,SKIN AND NAILS PO) Take  by mouth.  ipratropium (ATROVENT) 0.03 % nasal spray 2 Sprays by Both Nostrils route every twelve (12) hours. 30 mL 1    acetaminophen (TYLENOL) 325 mg tablet Take 2 Tabs by mouth every four (4) hours as needed for Pain or Fever.  60 Tab 2    magnesium oxide (MAG-OX) 400 mg tablet Take 250 mg by mouth daily.      Vit C-Vit E-Copper-ZnOx-Lutein (PRESERVISION LUTEIN) 226 mg-200 unit -5 mg-0.8 mg cap Take  by mouth.  COQ10, UBIQUINOL, PO Take  by mouth.  cholecalciferol (VITAMIN D3) 1,000 unit cap Take  by mouth daily.  cyanocobalamin 1,000 mcg tablet Take 5,000 mcg by mouth daily.  folic acid (FOLVITE) 1 mg tablet Take  by mouth daily.  azithromycin (ZITHROMAX) 500 mg tab Take 1 TAB by mouth 30-60 minutes prior to procedure 3 Tablet 0    diclofenac (VOLTAREN) 1 % gel Apply 2 g to affected area two (2) times a day. (Patient not taking: Reported on 9/21/2021) 100 g 1     Allergies   Allergen Reactions    Penicillins Hives       Family History   Problem Relation Age of Onset    Cancer Father     Lung Cancer Father         smoker    Heart Disease Brother     Diabetes Brother     Diabetes Maternal Grandfather     Prostate Cancer Brother      Social History     Tobacco Use    Smoking status: Never Smoker    Smokeless tobacco: Never Used   Substance Use Topics    Alcohol use:  Yes     Alcohol/week: 5.0 standard drinks     Types: 5 Glasses of wine per week     Comment: beer or glass of wine nightly          Marty Tao MD

## 2021-10-14 NOTE — PROGRESS NOTES
Oj Dose  Identified pt with two pt identifiers(name and ). Chief Complaint   Patient presents with    Annual Wellness Visit     RM19// pt is presenting today with wife for AWV is having PT for balance       1. Have you been to the ER, urgent care clinic since your last visit? Hospitalized since your last visit? NO    2. Have you seen or consulted any other health care providers outside of the [address] [place] [address] since your last visit? Include any pap smears or colon screening. NO      Provider notified of reason for visit, vitals and flowsheets obtained on patients. Patient received paperwork for advance directive during previous visit but has not completed at this time     Reviewed record In preparation for visit, huddled with provider and have obtained necessary documentation      Health Maintenance Due   Topic    Medicare Yearly Exam     Flu Vaccine (1)       Wt Readings from Last 3 Encounters:   10/14/21 171 lb 6.4 oz (77.7 kg)   21 178 lb (80.7 kg)   21 178 lb (80.7 kg)     Temp Readings from Last 3 Encounters:   10/14/21 97.4 °F (36.3 °C) (Oral)   21 97.9 °F (36.6 °C) (Oral)   21 97.3 °F (36.3 °C) (Oral)     BP Readings from Last 3 Encounters:   10/14/21 122/75   21 126/74   21 123/80     Pulse Readings from Last 3 Encounters:   10/14/21 (!) 54   21 81   21 (!) 59     Vitals:    10/14/21 1325   BP: 122/75   Pulse: (!) 54   Resp: 14   Temp: 97.4 °F (36.3 °C)   TempSrc: Oral   SpO2: 96%   Weight: 171 lb 6.4 oz (77.7 kg)   Height: 5' 7\" (1.702 m)   PainSc:   0 - No pain         Learning Assessment:  :     No flowsheet data found.     Depression Screening:  :     3 most recent PHQ Screens 10/14/2021   Little interest or pleasure in doing things Not at all   Feeling down, depressed, irritable, or hopeless Not at all   Total Score PHQ 2 0   Trouble falling or staying asleep, or sleeping too much -   Feeling tired or having little energy - Poor appetite, weight loss, or overeating -   Feeling bad about yourself - or that you are a failure or have let yourself or your family down -   Trouble concentrating on things such as school, work, reading, or watching TV -   Moving or speaking so slowly that other people could have noticed; or the opposite being so fidgety that others notice -   Thoughts of being better off dead, or hurting yourself in some way -   PHQ 9 Score -   How difficult have these problems made it for you to do your work, take care of your home and get along with others -       Fall Risk Assessment:  :     Fall Risk Assessment, last 12 mths 10/14/2021   Able to walk? Yes   Fall in past 12 months? 0   Do you feel unsteady? 0   Are you worried about falling 0       Abuse Screening:  :     Abuse Screening Questionnaire 12/15/2020 6/23/2020 1/21/2020 12/11/2019 11/20/2019 10/16/2019   Do you ever feel afraid of your partner? N N N N N N   Are you in a relationship with someone who physically or mentally threatens you? N N N N N N   Is it safe for you to go home? Y Y Y Y Y Y       ADL Screening:  :     No flowsheet data found. Medication reconciliation up to date and corrected with patient at this time.

## 2021-10-14 NOTE — PATIENT INSTRUCTIONS
Medicare Wellness Visit, Male    The best way to live healthy is to have a lifestyle where you eat a well-balanced diet, exercise regularly, limit alcohol use, and quit all forms of tobacco/nicotine, if applicable. Regular preventive services are another way to keep healthy. Preventive services (vaccines, screening tests, monitoring & exams) can help personalize your care plan, which helps you manage your own care. Screening tests can find health problems at the earliest stages, when they are easiest to treat. [facility] follows the current, evidence-based guidelines published by the [facility] Jaun Alen ([facility]) when recommending preventive services for our patients. Because we follow these guidelines, sometimes recommendations change over time as research supports it. (For example, a prostate screening blood test is no longer routinely recommended for men with no symptoms). Of course, you and your doctor may decide to screen more often for some diseases, based on your risk and co-morbidities (chronic disease you are already diagnosed with). Preventive services for you include:  - Medicare offers their members a free annual wellness visit, which is time for you and your primary care provider to discuss and plan for your preventive service needs. Take advantage of this benefit every year!  -All adults over age 72 should receive the recommended pneumonia vaccines. Current USPSTF guidelines recommend a series of two vaccines for the best pneumonia protection.   -All adults should have a flu vaccine yearly and tetanus vaccine every 10 years.  -All adults age 48 and older should receive the shingles vaccines (series of two vaccines).        -All adults age 38-68 who are overweight should have a diabetes screening test once every three years.   -Other screening tests & preventive services for persons with diabetes include: an eye exam to screen for diabetic retinopathy, a kidney function test, a foot exam, and stricter control over your cholesterol.   -Cardiovascular screening for adults with routine risk involves an electrocardiogram (ECG) at intervals determined by the provider.   -Colorectal cancer screening should be done for adults age 54-65 with no increased risk factors for colorectal cancer. There are a number of acceptable methods of screening for this type of cancer. Each test has its own benefits and drawbacks. Discuss with your provider what is most appropriate for you during your annual wellness visit. The different tests include: colonoscopy (considered the best screening method), a fecal occult blood test, a fecal DNA test, and sigmoidoscopy.  -All adults born between [facility] should be screened once for Hepatitis C.  -An Abdominal Aortic Aneurysm (AAA) Screening is recommended for men age 73-68 who has ever smoked in their lifetime.      Here is a list of your current Health Maintenance items (your personalized list of preventive services) with a due date:  Health Maintenance Due   Topic Date Due    Yearly Flu Vaccine (1) 09/01/2021

## 2021-10-15 LAB
ALBUMIN SERPL-MCNC: 3.8 G/DL (ref 3.5–5)
ALBUMIN/GLOB SERPL: 1.2 {RATIO} (ref 1.1–2.2)
ALP SERPL-CCNC: 109 U/L (ref 45–117)
ALT SERPL-CCNC: 37 U/L (ref 12–78)
ANION GAP SERPL CALC-SCNC: 5 MMOL/L (ref 5–15)
AST SERPL-CCNC: 31 U/L (ref 15–37)
BASOPHILS # BLD: 0.1 K/UL (ref 0–0.1)
BASOPHILS NFR BLD: 1 % (ref 0–1)
BILIRUB SERPL-MCNC: 0.3 MG/DL (ref 0.2–1)
BUN SERPL-MCNC: 18 MG/DL (ref 6–20)
BUN/CREAT SERPL: 23 (ref 12–20)
CALCIUM SERPL-MCNC: 9.3 MG/DL (ref 8.5–10.1)
CHLORIDE SERPL-SCNC: 106 MMOL/L (ref 97–108)
CHOLEST SERPL-MCNC: 143 MG/DL
CO2 SERPL-SCNC: 27 MMOL/L (ref 21–32)
CREAT SERPL-MCNC: 0.77 MG/DL (ref 0.7–1.3)
DIFFERENTIAL METHOD BLD: ABNORMAL
EOSINOPHIL # BLD: 0.2 K/UL (ref 0–0.4)
EOSINOPHIL NFR BLD: 4 % (ref 0–7)
ERYTHROCYTE [DISTWIDTH] IN BLOOD BY AUTOMATED COUNT: 14.7 % (ref 11.5–14.5)
GLOBULIN SER CALC-MCNC: 3.2 G/DL (ref 2–4)
GLUCOSE SERPL-MCNC: 86 MG/DL (ref 65–100)
HCT VFR BLD AUTO: 48.3 % (ref 36.6–50.3)
HDLC SERPL-MCNC: 60 MG/DL
HDLC SERPL: 2.4 {RATIO} (ref 0–5)
HGB BLD-MCNC: 15.6 G/DL (ref 12.1–17)
IMM GRANULOCYTES # BLD AUTO: 0 K/UL (ref 0–0.04)
IMM GRANULOCYTES NFR BLD AUTO: 0 % (ref 0–0.5)
LDLC SERPL CALC-MCNC: 71.4 MG/DL (ref 0–100)
LYMPHOCYTES # BLD: 1.8 K/UL (ref 0.8–3.5)
LYMPHOCYTES NFR BLD: 30 % (ref 12–49)
MCH RBC QN AUTO: 30.9 PG (ref 26–34)
MCHC RBC AUTO-ENTMCNC: 32.3 G/DL (ref 30–36.5)
MCV RBC AUTO: 95.6 FL (ref 80–99)
MONOCYTES # BLD: 0.5 K/UL (ref 0–1)
MONOCYTES NFR BLD: 8 % (ref 5–13)
NEUTS SEG # BLD: 3.5 K/UL (ref 1.8–8)
NEUTS SEG NFR BLD: 57 % (ref 32–75)
NRBC # BLD: 0 K/UL (ref 0–0.01)
NRBC BLD-RTO: 0 PER 100 WBC
PLATELET # BLD AUTO: 96 K/UL (ref 150–400)
PMV BLD AUTO: 11.5 FL (ref 8.9–12.9)
POTASSIUM SERPL-SCNC: 4 MMOL/L (ref 3.5–5.1)
PROT SERPL-MCNC: 7 G/DL (ref 6.4–8.2)
RBC # BLD AUTO: 5.05 M/UL (ref 4.1–5.7)
RBC MORPH BLD: ABNORMAL
SODIUM SERPL-SCNC: 138 MMOL/L (ref 136–145)
TRIGL SERPL-MCNC: 58 MG/DL (ref ?–150)
VLDLC SERPL CALC-MCNC: 11.6 MG/DL
WBC # BLD AUTO: 6.1 K/UL (ref 4.1–11.1)

## 2021-10-28 ENCOUNTER — OFFICE VISIT (OUTPATIENT)
Dept: CARDIOLOGY CLINIC | Age: 86
End: 2021-10-28
Payer: MEDICARE

## 2021-10-28 VITALS
HEIGHT: 67 IN | WEIGHT: 170 LBS | HEART RATE: 60 BPM | OXYGEN SATURATION: 96 % | SYSTOLIC BLOOD PRESSURE: 110 MMHG | DIASTOLIC BLOOD PRESSURE: 74 MMHG | BODY MASS INDEX: 26.68 KG/M2

## 2021-10-28 DIAGNOSIS — I48.0 PAF (PAROXYSMAL ATRIAL FIBRILLATION) (HCC): Primary | ICD-10-CM

## 2021-10-28 PROCEDURE — 1101F PT FALLS ASSESS-DOCD LE1/YR: CPT | Performed by: INTERNAL MEDICINE

## 2021-10-28 PROCEDURE — G8427 DOCREV CUR MEDS BY ELIG CLIN: HCPCS | Performed by: INTERNAL MEDICINE

## 2021-10-28 PROCEDURE — 99214 OFFICE O/P EST MOD 30 MIN: CPT | Performed by: INTERNAL MEDICINE

## 2021-10-28 PROCEDURE — G8432 DEP SCR NOT DOC, RNG: HCPCS | Performed by: INTERNAL MEDICINE

## 2021-10-28 PROCEDURE — G0463 HOSPITAL OUTPT CLINIC VISIT: HCPCS | Performed by: INTERNAL MEDICINE

## 2021-10-28 PROCEDURE — G8536 NO DOC ELDER MAL SCRN: HCPCS | Performed by: INTERNAL MEDICINE

## 2021-10-28 PROCEDURE — 93010 ELECTROCARDIOGRAM REPORT: CPT | Performed by: INTERNAL MEDICINE

## 2021-10-28 PROCEDURE — G8419 CALC BMI OUT NRM PARAM NOF/U: HCPCS | Performed by: INTERNAL MEDICINE

## 2021-10-28 PROCEDURE — 93005 ELECTROCARDIOGRAM TRACING: CPT | Performed by: INTERNAL MEDICINE

## 2021-10-28 NOTE — PROGRESS NOTES
Sunday Warren MD    [address] [facility] [place], [address] [facility]    Office [phone],[facility] [phone]            Dear Dr Emily Dye,    I had the pleasure of seeing Mr. Rhonda Wasserman in the office today. Assessment:  Severe AS- S/p TAVR  CAD - s/p LAD stent  PAF  HLD  Hx of GERD  Sinus bradycardia  Tremors/Balance issues - followed by neurology       Plan:     Not on B blockers sec to bradycardia. HR in 50-60's off B blockers  On Eliquis  Aggressive cardiovascular risk factor modification. F/u 6 mths/earlier prn          Medication Side Effects and Warnings were discussed with patient: yes  Patient Labs were reviewed and or requested:  yes  Patient Past Records were reviewed and or requested: yes    I appreciate the opportunity to be involved in . Please see note below for details. Please do not hesitate to contact us with questions or concerns. Sunday Warren MD    Cardiac Testing/ Procedures: A. Cardiac Cath/PCI:12/4/19 ( Dr Basilia Davis) Findings:  1)Severe mid LAD calcified disease  2)S/p PCI with 3 by 38 mm Xience EFFIE placement following Rotational atherectomy with 1.75 mm [place]. Post dilatation with 3.25 mm Bballoon at 20-24 KYRA. Good final result. Moderate distal disease deferred.     Access: Right radial--no issues     11/6/19 R Brachial vein access - 7 F sheath  R Radial access - 6 F sheath     Calcified coronaries  RCA - 3DRC; LCA - JL4  Codominant     L Main: Large; MLI     LAD: Prox - large; Mid/Distal Med; Ostial 30%; Mid - Ca+++; 80% ( haziness prob sec to Ca+); Another mid lesion - 70% ; Small to med D1- Prox 30%;  Small  D2; Large tortuous septal     LCflex: Med to large; MLI; OM1 - Med to large; Prox 50%     RCA: Med to large; Prox/Mid - MLI; Distal at bifurcation 60-70%- small PDA/PLB     LVEDP: valve not crossed     LVEF: not assessed     No significant gradient across aortic valve.           RHC findings:     RAPm= 4      mmHg  RVSP= 19 mmHg  PAPm= 14       mmHg  PCWPm=7    mmHg  CO=   6.02       l/min  CI=3.08     Specimens Removed : None     Complications: None     Closure Device: TR    B.ECHO/SHARAN:  7/28/20 · LV: Normal cavity size and systolic function (ejection fraction normal). Mildly increased wall thickness. Calculated left ventricular ejection fraction is 61%. Biplane method used to measure ejection fraction. Mild (grade 1) left ventricular diastolic dysfunction. · LA: Moderately dilated left atrium. · AV: Prosthetic aortic valve. Aortic valve mean gradient is 9.9 mmHg. There is a 26 mm KIM prosthetic aortic valve from prior TAVR procedure. Mild perivalvular aortic valve regurgitation is present. · MV: Mitral valve thickening. Mild mitral valve regurgitation is present. · TV: Mild tricuspid valve regurgitation is present. TR gradient = 41 mmHg; unable to assess IVC for RAP    1/14/20 Normal cavity size and systolic function (ejection fraction normal). Mild concentric hypertrophy. Calculated left ventricular ejection fraction is 63%. Biplane method used to measure ejection fraction. No regional wall motion abnormality noted. Mild (grade 1) left ventricular diastolic dysfunction. · Prosthetic aortic valve. There is a 26 mm Watson KIM prosthetic aortic valve from prior TAVR procedure. Prosthesis is normal. Trace paravalvular leak is present. Gradients are appropriate for this valve. Mitral valve thickening. Mild mitral annular calcification. Mild mitral valve regurgitation is present. 10/23/19 Left Ventricle: Normal cavity size and systolic function (ejection fraction normal). Mildly increased wall thickness. Estimated left ventricular ejection fraction is 56 - 60%. No regional wall motion abnormality noted. Mild (grade 1) left ventricular diastolic dysfunction. · Mitral Valve: Mitral annular calcification. Mild mitral valve regurgitation is present. · Left Atrium: Dilated left atrium.   · Tricuspid Valve: Mild tricuspid valve regurgitation is present. · Aortic Valve: Severe aortic valve sclerosis with reduced excursion. Aortic valve leaflet calcification present with reduced excursion. Severe aortic valve stenosis is present. Mild to moderate aortic valve regurgitation is present. Aortic Valve Systolic Mean Gradient 56 mm Hg; AV peak gradient ( pedhoff) -74 mm Hg    C.StressNuclear/Stress ECHO/Stress test:    D.Vascular: 11/5/19 Carotid doppler 1-59% bilat ICA    E. EP: Event monitor-7-day 1/24/2020to  1/30/2020-0 critical, 0 serious and 2 stable events. Sinus bradycardia 1/27/2020-2:02 PM 38 bpm.-Auto detected. F. Miscellaneous: 12/20/19 1. Angiography of the ascending aorta and aortoiliac bifurcation  2. Temporary transvenous pacemaker insertion  3. Left heart catheterization  4. Implantation of a 26 mm Watson S3 transcatheter aortic valve via the right transfemoral approach  5. 14F right?femoral artery access with Perclose pre-closure / 6F left femoral vein access with manual compression / 6F left femoral artery access with Perclose closure    Subjective:    C/o feeling tired. He is walking in his neighborhood - slowing down. No chest pain/edema/palpitations/syncope.           ROS:  (bold if positive, if negative)           Medications before admission:    Current Outpatient Medications   Medication Sig Dispense    primidone (MYSOLINE) 250 mg tablet 1.5 tabs in am, 0.5 tab with lunch and 2 tabs qhs (Patient taking differently: 1 tabs in am and 2 tabs qhs) 120 Tablet    azithromycin (ZITHROMAX) 500 mg tab Take 1 TAB by mouth 30-60 minutes prior to procedure 3 Tablet    apixaban (Eliquis) 5 mg tablet TAKE 1 TABLET TWICE A  Tablet    atorvastatin (LIPITOR) 40 mg tablet TAKE 1 TABLET DAILY 90 Tablet    Detrol LA 4 mg ER capsule TAKE 1 CAPSULE DAILY 90 Cap    niacin ER (NIASPAN) 500 mg tablet TAKE 1 TABLET DAILY 90 Tab    omeprazole (PRILOSEC) 20 mg capsule TAKE 1 CAPSULE DAILY 90 Cap    ascorbic acid, vitamin C, (VITAMIN C) 1,000 mg tablet Take 1 Tab by mouth daily.  multivitamin with minerals (HAIR,SKIN AND NAILS PO) Take  by mouth.  ipratropium (ATROVENT) 0.03 % nasal spray 2 Sprays by Both Nostrils route every twelve (12) hours. 30 mL    acetaminophen (TYLENOL) 325 mg tablet Take 2 Tabs by mouth every four (4) hours as needed for Pain or Fever. 60 Tab    magnesium oxide (MAG-OX) 400 mg tablet Take 250 mg by mouth daily.  Vit C-Vit E-Copper-ZnOx-Lutein (PRESERVISION LUTEIN) 226 mg-200 unit -5 mg-0.8 mg cap Take  by mouth.  COQ10, UBIQUINOL, PO Take  by mouth.  cholecalciferol (VITAMIN D3) 1,000 unit cap Take 2,000 Units by mouth daily.  cyanocobalamin 1,000 mcg tablet Take 5,000 mcg by mouth daily.  folic acid (FOLVITE) 1 mg tablet Take  by mouth daily. No current facility-administered medications for this visit. Family History of CAD:    Yes    Social History:  Current  Smoker No    Physical Exam:  Visit Vitals  /74 (BP 1 Location: Left upper arm, BP Patient Position: Sitting)   Pulse 60   Ht 5' 7\" (1.702 m)   Wt 170 lb (77.1 kg)   SpO2 96%   BMI 26.63 kg/m²        Gen:    Well-developed, well-nourished, in no acute distress  HEENT:  Pink conjunctivae, hearing intact to voice, moist mucous membranes  Neck:  Supple,No JVD, No Carotid Bruit, Thyroid- non tender  Resp:  No accessory muscle use, Clear breath sounds, No rales or rhonchi  Card:   Regular Rate,Rythm,Normal S1, S2,2/6 sys murmur+,No rubs or gallop. No thrills.    Abd:  Soft,  normoactive bowel sounds are present,   MSK:   No cyanosis  Skin:   No rashes or ulcers  Neuro:  moving all four extremities, no focal deficit, follows commands appropriately  Psych:  Good insight, oriented to person, place and time, alert, Nml Affect  LE: No edema  Vascular: Radial Pulses 2+ and symmetric      EKG:        LABS: LDL 75 3/2020        ATTENTION:   This medical record was transcribed using an electronic medical records/speech recognition system. Although proofread, it may and can contain electronic, spelling and other errors. Corrections may be executed at a later time. Please feel free to contact us for any clarifications as needed.       Cory Mcgowan MD

## 2021-10-28 NOTE — PROGRESS NOTES
Sigrid Jackson is a 80 y.o. male    Chief Complaint   Patient presents with    Follow-up     6 month f/u PAF    Coronary Artery Disease    Cholesterol Problem   patient is in therapy for walking     Chest pain No    SOB No    Dizziness No    Swelling No    Refills No    Visit Vitals  /74 (BP 1 Location: Left upper arm, BP Patient Position: Sitting)   Pulse 60   Ht 5' 7\" (1.702 m)   Wt 170 lb (77.1 kg)   SpO2 96%   BMI 26.63 kg/m²       1. Have you been to the ER, urgent care clinic since your last visit? Hospitalized since your last visit? No    2. Have you seen or consulted any other health care providers outside of the [address] [place] [address] since your last visit? Include any pap smears or colon screening.   No

## 2021-12-28 ENCOUNTER — TELEPHONE (OUTPATIENT)
Dept: NEUROLOGY | Age: 86
End: 2021-12-28

## 2021-12-28 DIAGNOSIS — K21.9 GASTROESOPHAGEAL REFLUX DISEASE WITHOUT ESOPHAGITIS: ICD-10-CM

## 2021-12-28 RX ORDER — OMEPRAZOLE 20 MG/1
CAPSULE, DELAYED RELEASE ORAL
Qty: 90 CAPSULE | Refills: 3 | Status: SHIPPED | OUTPATIENT
Start: 2021-12-28

## 2021-12-30 ENCOUNTER — OFFICE VISIT (OUTPATIENT)
Dept: NEUROLOGY | Age: 86
End: 2021-12-30

## 2021-12-30 ENCOUNTER — OFFICE VISIT (OUTPATIENT)
Dept: NEUROLOGY | Age: 86
End: 2021-12-30
Payer: MEDICARE

## 2021-12-30 DIAGNOSIS — F90.0 ATTENTION DEFICIT HYPERACTIVITY DISORDER (ADHD), INATTENTIVE TYPE, MODERATE: Chronic | ICD-10-CM

## 2021-12-30 DIAGNOSIS — G31.84 MILD COGNITIVE IMPAIRMENT: Primary | ICD-10-CM

## 2021-12-30 DIAGNOSIS — F41.9 MILD ANXIETY: ICD-10-CM

## 2021-12-30 DIAGNOSIS — F41.8 ANXIETY ABOUT HEALTH: ICD-10-CM

## 2021-12-30 DIAGNOSIS — F32.1 MODERATE MAJOR DEPRESSION (HCC): ICD-10-CM

## 2021-12-30 PROCEDURE — 96138 PSYCL/NRPSYC TECH 1ST: CPT | Performed by: CLINICAL NEUROPSYCHOLOGIST

## 2021-12-30 PROCEDURE — 96136 PSYCL/NRPSYC TST PHY/QHP 1ST: CPT | Performed by: CLINICAL NEUROPSYCHOLOGIST

## 2021-12-30 PROCEDURE — 90791 PSYCH DIAGNOSTIC EVALUATION: CPT | Performed by: CLINICAL NEUROPSYCHOLOGIST

## 2021-12-30 PROCEDURE — 96132 NRPSYC TST EVAL PHYS/QHP 1ST: CPT | Performed by: CLINICAL NEUROPSYCHOLOGIST

## 2021-12-30 PROCEDURE — 96137 PSYCL/NRPSYC TST PHY/QHP EA: CPT | Performed by: CLINICAL NEUROPSYCHOLOGIST

## 2021-12-30 PROCEDURE — 96133 NRPSYC TST EVAL PHYS/QHP EA: CPT | Performed by: CLINICAL NEUROPSYCHOLOGIST

## 2021-12-30 PROCEDURE — 96139 PSYCL/NRPSYC TST TECH EA: CPT | Performed by: CLINICAL NEUROPSYCHOLOGIST

## 2021-12-30 NOTE — PROGRESS NOTES
[address] [facility],[address]  [address]   [place] [address] [place] [address] [facility] [address]   [phone] Office   [phone] Fax      Neuropsychology    Initial Diagnostic Interview Note      Referral:  Verona Herring MD    Maurice Douglas is a 80 y.o. right handed male who was accompanied to the initial clinical interview on 12/30/21 . Please refer to his medical records for details pertaining to his history. At the start of the appointment, I reviewed the patient's [facility] Epic Chart (including Media scanned in from previous providers) for the active Problem List, all pertinent Past Medical Hx, medications, recent radiologic and laboratory findings. In addition, I reviewed pt's documented Immunization Record and Encounter History. Master's Degree completed and repeated the 3rd or 4th grade, not sure why. No other academic issues. He is retired, and worked for [employer]. He has noticed a mild decline in short term memory. He worked in computers. He forgets the content of conversations. Misplaces things. Starts tasks and does not complete. HE can't remember what he ate the meal before, sometimes. His long term memory is fine. No major behavioral or personality changes. Spouse notices a decline and is concerned. No major known stroke, meningitis/encephalitis, SIMRAN Fever, Lupus, Lyme, TBI, sz. He forgets conversations. Things like that. This has been going on since they came to [facility] two years ago. No known family history of dementia. He remains independent for driving, medications, finances, day-to-day chores, etc. He reads a lot. He will lose his temper, gets easily frustrated, per spouse. Patient says not at much. Driving is fine, and he remains independent for his medication, finances, day-to-day chores, hygiene, ADLs. Spouse is there to help him with meds sometimes. No acute or focal issues.  No changes in sense of taste or smell. No previous neuropsych. Neuropsychological Mental Status Exam (NMSE):      Historian: Good  Praxis: No UE apraxia  R/L Orientation: Intact to self and to other  Dress: within normal limits   Weight: within normal limits   Appearance/Hygiene: within normal limits   Gait: within normal limits   Assistive Devices: Glasses  Mood: within normal limits   Affect: within normal limits   Comprehension: within normal limits   Thought Process: within normal limits   Expressive Language: within normal limits   Receptive Language: within normal limits   Motor:  No cognitive perseveration. Tremor in head, hands  ETOH: was drinking 1 glass of wine per day, and stopped completely a year ago, he was a heavy drinker in his younger days, for sure. Tobacco: Denied  Illicit: denied  SI/HI: denied  Psychosis:  Insight: Within normal limits  Judgment: Within normal limits  Other Psych:      Past Medical History:   Diagnosis Date    GERD (gastroesophageal reflux disease)     Hyperactivity of bladder     Hypercholesterolemia     Macular degeneration disease     Skin cancer (melanoma) (HCC)     Tremor        Past Surgical History:   Procedure Laterality Date    HX HERNIA REPAIR      HX MOHS PROCEDURES         Allergies   Allergen Reactions    Penicillins Hives       Family History   Problem Relation Age of Onset    Cancer Father     Lung Cancer Father         smoker    Heart Disease Brother     Diabetes Brother     Diabetes Maternal Grandfather     Prostate Cancer Brother        Social History     Tobacco Use    Smoking status: Never Smoker    Smokeless tobacco: Never Used   Vaping Use    Vaping Use: Never used   Substance Use Topics    Alcohol use:  Yes     Alcohol/week: 5.0 standard drinks     Types: 5 Glasses of wine per week     Comment: beer or glass of wine nightly     Drug use: Never       Current Outpatient Medications   Medication Sig Dispense Refill    apixaban (Eliquis) 5 mg tablet TAKE 1 TABLET TWICE A  Tablet 3    omeprazole (PRILOSEC) 20 mg capsule TAKE 1 CAPSULE DAILY 90 Capsule 3    primidone (MYSOLINE) 250 mg tablet 1.5 tabs in am, 0.5 tab with lunch and 2 tabs qhs (Patient taking differently: 1 tabs in am and 2 tabs qhs) 120 Tablet 3    azithromycin (ZITHROMAX) 500 mg tab Take 1 TAB by mouth 30-60 minutes prior to procedure 3 Tablet 0    atorvastatin (LIPITOR) 40 mg tablet TAKE 1 TABLET DAILY 90 Tablet 3    Detrol LA 4 mg ER capsule TAKE 1 CAPSULE DAILY 90 Cap 3    niacin ER (NIASPAN) 500 mg tablet TAKE 1 TABLET DAILY 90 Tab 3    ascorbic acid, vitamin C, (VITAMIN C) 1,000 mg tablet Take 1 Tab by mouth daily.  multivitamin with minerals (HAIR,SKIN AND NAILS PO) Take  by mouth.  ipratropium (ATROVENT) 0.03 % nasal spray 2 Sprays by Both Nostrils route every twelve (12) hours. 30 mL 1    acetaminophen (TYLENOL) 325 mg tablet Take 2 Tabs by mouth every four (4) hours as needed for Pain or Fever. 60 Tab 2    magnesium oxide (MAG-OX) 400 mg tablet Take 250 mg by mouth daily.  Vit C-Vit E-Copper-ZnOx-Lutein (PRESERVISION LUTEIN) 226 mg-200 unit -5 mg-0.8 mg cap Take  by mouth.  COQ10, UBIQUINOL, PO Take  by mouth.  cholecalciferol (VITAMIN D3) 1,000 unit cap Take 2,000 Units by mouth daily.  cyanocobalamin 1,000 mcg tablet Take 5,000 mcg by mouth daily.  folic acid (FOLVITE) 1 mg tablet Take  by mouth daily. Plan:  Obtain authorization for testing from insurance company. Report to follow once testing, scoring, and interpretation completed. ? Organic based neurocognitive issues versus mood disorder or combination of same. ? Problems organic, functional, or both? This note will not be viewable in [address]

## 2021-12-30 NOTE — LETTER
1/3/2022    Patient: Lyudmila Remedies   YOB: 1934   Date of Visit: 12/30/2021     Sophia Portillo MD  [address]  [address] [place]  [address]  [address]  [address] [place]  [address] [facility] [address]    Dear MD Sneha Larson MD,      Thank you for referring Mr. Soha Pascual to [facility] for evaluation. My notes for this consultation are attached. If you have questions, please do not hesitate to call me. I look forward to following your patient along with you.       Sincerely,    Emily Muse PsyD

## 2022-01-03 NOTE — PROGRESS NOTES
[address] [facility],5Th [address]  [address]   [place] [address] [facility] [place] [address] [address]   [phone] Office   [phone] Fax      Neuropsychological Evaluation Report    Referral:  Saad Leal MD    Ara Peterson is a 80 y.o. right handed male who was unaccompanied to the initial clinical interview on 12/30/21 . Please refer to his medical records for details pertaining to his history. At the start of the appointment, I reviewed the patient's [facility] Epic Chart (including Media scanned in from previous providers) for the active Problem List, all pertinent Past Medical Hx, medications, recent radiologic and laboratory findings. In addition, I reviewed pt's documented Immunization Record and Encounter History. Master's Degree completed and repeated the 3rd or 4th grade, not sure why. No other academic issues. He is retired, and worked for [employer]. He has noticed a mild decline in short term memory. He worked in computers. He forgets the content of conversations. Misplaces things. Starts tasks and does not complete. HE can't remember what he ate the meal before, sometimes. His long term memory is fine. No major behavioral or personality changes. Spouse notices a decline and is concerned. No major known stroke, meningitis/encephalitis, SIMRAN Fever, Lupus, Lyme, TBI, sz. He forgets conversations. Things like that. This has been going on since they came to [place] two years ago. No known family history of dementia. He remains independent for driving, medications, finances, day-to-day chores, etc. He reads a lot. He will lose his temper, gets easily frustrated, per spouse. Patient says not at much. Driving is fine, and he remains independent for his medication, finances, day-to-day chores, hygiene, ADLs. Spouse is there to help him with meds sometimes. No acute or focal issues.  No changes in sense of taste or smell.      No previous neuropsych. Neuropsychological Mental Status Exam (NMSE):      Historian: Good  Praxis: No UE apraxia  R/L Orientation: Intact to self and to other  Dress: within normal limits   Weight: within normal limits   Appearance/Hygiene: within normal limits   Gait: within normal limits   Assistive Devices: Glasses  Mood: within normal limits   Affect: within normal limits   Comprehension: within normal limits   Thought Process: within normal limits   Expressive Language: within normal limits   Receptive Language: within normal limits   Motor:  No cognitive perseveration. Tremor in head, hands  ETOH: was drinking 1 glass of wine per day, and stopped completely a year ago, he was a heavy drinker in his younger days, for sure. Tobacco: Denied  Illicit: denied  SI/HI: denied  Psychosis:  Insight: Within normal limits  Judgment: Within normal limits  Other Psych:      Past Medical History:   Diagnosis Date    GERD (gastroesophageal reflux disease)     Hyperactivity of bladder     Hypercholesterolemia     Macular degeneration disease     Skin cancer (melanoma) (HCC)     Tremor        Past Surgical History:   Procedure Laterality Date    HX HERNIA REPAIR      HX MOHS PROCEDURES         Allergies   Allergen Reactions    Penicillins Hives       Family History   Problem Relation Age of Onset    Cancer Father     Lung Cancer Father         smoker    Heart Disease Brother     Diabetes Brother     Diabetes Maternal Grandfather     Prostate Cancer Brother        Social History     Tobacco Use    Smoking status: Never Smoker    Smokeless tobacco: Never Used   Vaping Use    Vaping Use: Never used   Substance Use Topics    Alcohol use:  Yes     Alcohol/week: 5.0 standard drinks     Types: 5 Glasses of wine per week     Comment: beer or glass of wine nightly     Drug use: Never       Current Outpatient Medications   Medication Sig Dispense Refill    apixaban (Eliquis) 5 mg tablet TAKE 1 TABLET TWICE A  Tablet 3    omeprazole (PRILOSEC) 20 mg capsule TAKE 1 CAPSULE DAILY 90 Capsule 3    primidone (MYSOLINE) 250 mg tablet 1.5 tabs in am, 0.5 tab with lunch and 2 tabs qhs (Patient taking differently: 1 tabs in am and 2 tabs qhs) 120 Tablet 3    azithromycin (ZITHROMAX) 500 mg tab Take 1 TAB by mouth 30-60 minutes prior to procedure 3 Tablet 0    atorvastatin (LIPITOR) 40 mg tablet TAKE 1 TABLET DAILY 90 Tablet 3    Detrol LA 4 mg ER capsule TAKE 1 CAPSULE DAILY 90 Cap 3    niacin ER (NIASPAN) 500 mg tablet TAKE 1 TABLET DAILY 90 Tab 3    ascorbic acid, vitamin C, (VITAMIN C) 1,000 mg tablet Take 1 Tab by mouth daily.  multivitamin with minerals (HAIR,SKIN AND NAILS PO) Take  by mouth.  ipratropium (ATROVENT) 0.03 % nasal spray 2 Sprays by Both Nostrils route every twelve (12) hours. 30 mL 1    acetaminophen (TYLENOL) 325 mg tablet Take 2 Tabs by mouth every four (4) hours as needed for Pain or Fever. 60 Tab 2    magnesium oxide (MAG-OX) 400 mg tablet Take 250 mg by mouth daily.  Vit C-Vit E-Copper-ZnOx-Lutein (PRESERVISION LUTEIN) 226 mg-200 unit -5 mg-0.8 mg cap Take  by mouth.  COQ10, UBIQUINOL, PO Take  by mouth.  cholecalciferol (VITAMIN D3) 1,000 unit cap Take 2,000 Units by mouth daily.  cyanocobalamin 1,000 mcg tablet Take 5,000 mcg by mouth daily.  folic acid (FOLVITE) 1 mg tablet Take  by mouth daily. Plan:  Obtain authorization for testing from insurance company. Report to follow once testing, scoring, and interpretation completed. ? Organic based neurocognitive issues versus mood disorder or combination of same. ? Problems organic, functional, or both? This note will not be viewable in [address]       Neuropsychological Test Results  Patient Testing 12/30/21 Report Completed 1/3/22  A Psychometrist Assisted w/ portions of this evaluation while under my direct  supervision    The following evaluation procedures/tests were administered:      Neuropsychologist Administered/Interpreted:  Neuropsychological Mental Status Exam, Revised Memory & Behavior Checklist,  Mini Mental Status Exam, Clock Drawing Test, Test Of Premorbid Functioning, Papa-Melzack Pain Questionnaire, History Taking  & Clinical Interview With The Patient, , AMRIT, CPT-III, Review Of Available Records. Psychometrist Administered under Neuropsychologist Supervision & Neuropsychologist Interpreted:  Verbal Fluency Tests, Donald & Donald - Revised, Trailmaking Test Parts A & B, Wechsler Adult Intelligence Scale - IV, [place] Learning Test - 3, Grooved Pegboard, Carrasquillo Depression Inventory - II, Carrasquillo Anxiety Inventory. Test Findings:  Test Findings:  Note:  The patients raw data have been compared with currently available norms which include demographic corrections for age, gender, and/or education. Sometimes, the patients scores are compared to demographically similar individuals as close to the patients age, education level, etc., as possible. \"Average\" is viewed as being +/- 1 standard deviation (SD) from the stated mean for a particular test score. \"Low average\" is viewed as being between 1 and 2 SD below the mean, and above average is viewed as being 1 and 2 SD above the mean. Scores falling in the borderline range (between 1-1/2 and 2 SD below the mean) are viewed with particular attention as to whether they are normal or abnormal neurocognitive test scores. Other methods of inference in analyzing the test data are also utilized, including the pattern and range of scores in the profile, bilateral motor functions, and the presence, if any, of pathognomonic signs. Behaviorally, the patient was friendly and cooperative and appeared motivated to perform well during this examination. Scores on the HRB were converted using norms from demographically similar individuals as close to the patient's age as possible.   Within this context, the results of this evaluation are viewed as a valid reflection of the patients actual neurocognitive and emotional status. His MMSE score of 30/30 correct was normal.  Clock drawing was normal.        His structured word list fluency, as assessed by the FAS Test, was within the mildly to moderately impaired range with a T score of 34. Category fluency was mildly impaired with a T score of 37. Confrontation naming, as assessed by the Donald & Donald as revised, was within the above average range with a T score 78. This pattern of performance is indicative of a patient who is at mildly increased risk for day-to-day problems with verbal fluency. Confrontation naming is normal.     The patient was administered the [facility] Continuous Performance Test - III,a computer administered test of sustained attention, and review of the subscales within this instrument revealed numerous concerns for inattentiveness without impulsivity. This pattern of performance is indicative of a patient who is at increased risk for day-to-day problems with sustained visual attention/concentration. The patient is not showing problems with working memory capacity (82nd %ile) or processing speed (63rd %ile) on the WAIS-IV. His Verbal Comprehension Index score of 136 was within the very superior range. His Perceptual Reasoning Index score of 119 was high average. These scores do not reflect a decline in functioning based on an assessment of premorbid functioning. The patient was administered the [place] Verbal Learning Test  - 3 and generated a normal range and positive learning curve over 5 repeated auditory word list learning trials.   An interference trial was normal.  Free and cued, \"short and long delayed recall were all normal.  Recognition recall was normal.  This pattern of performance is not indicative of a patient who is at increased risk for day-to-day problems with auditory learning and/or memory. Simple timed visual motor sequencing (Trailmaking Test Part A) was within the average range with a T score 46. His performance on a similar, but more complex task of timed visual motor sequencing (Trailmaking Test Part B) was within the average range with a T score of 52. He made only 1 sequencing error on this latter completed test..  Taken together, this pattern of performance is not indicative of a patient who is at increased risk for day-to-day problems with executive functioning. Fine motor dexterity was within the mildly impaired range bilaterally. This pattern of performance is not  indicative of a patient who is at increased risk for day-to-day problems with bilateral motor dexterity. The patient rated his current level of pain as \"1/5 - Mild\" on the Papa-Melzack Pain Questionnaire. He reported pain in his left neck and right shoulder. His Carrasquillo Depression Inventory- II score of 23 was within the moderately depressed range. His Carrasquillo Anxiety Inventory score of 10 reflected mild anxiety. The patient was administered the Personality Assessment Inventory struggle with some item comprehension issues and this task was discontinued. Impressions & Recommendations: This patient generated a a mixed normal/abnormal range Neuropsychological Evaluation with respect to neurocognitive functioning. In this regard, he is showing problems with sustained visual attention, verbal fluency, and bilateral fine motor dexterity. His IQ was very superior and his learning, memory, executive functioning, and performances across all other neurocognitive domains assessed are fully within or above the normal range. From an emotional standpoint, he reports moderate levels of depression and mild anxiety. Thankfully, this profile is not consistent with dementia.   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 Depression and anxiety issues exacerbate. I have making the diagnosis of MCI, though, given that he is showing some mild other deficits but again, reassuringly, this is not dementia. In addition to continued medical care, my recommendations include psychiatric treatment and counseling for depression and anxiety along with consideration for appropriate medication for attention if this is not medically contraindicated. The patient should be encouraged to remain as mentally, physically, and socially active as possible. Not concerned about driving, competency, day-to-day supervision, etc.  I have the patient is reassured by the mostly positive nature of these test results. I wish him well. We will have extensive baseline neurocognitive and psychologic data on him. Follow-up as needed. Clinical correlation is, course, indicated. I will discuss these findings with the patient and family when they follow up with me in the near future. A follow up Neuropsychological Evaluation is indicated on a prn basis. 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 If there is any additional information of which I am currently unaware, I would be more than happy to review it upon having it made available to me. Thank you for the opportunity to see this interesting individual.     Sincerely,       Jess Still. Emily Ma PsyD, EdS      Attachments:  IQ Test Results (In Media Section Of This EMR)    Cc: Candelario Reeves MD    Time Documentation:    90713*6 70292*5 (60 minutes)    96138 x 1  96139 x 5 Test Administration/Data Gathering By Technician: (3 hours).  50465 x 1 (first 30 minutes), 09472 x 5 (each additional 30 minutes)    11219 x 1  96133 x 1 Testing Evaluation Services by Neuropsychologist (1 hour, 50 minutes) 96132 x 1 (first hour), 96133 x 1 (50 minutes)    Definitions:      31792/03778:  Neurobehavioral Status Exam, Clinical interview. Clinical assessment of thinking, reasoning and judgment, by neuropsychologist, both face to face time with patient and time interpreting those test results and reporting, first and subsequent hours)    81204/18353: Neuropsychological Test Administration by Technician/Psychometrist, first 30 minutes and each additional 30 minutes. The above includes: Record review. Review of history provided by patient. Review of collaborative information. Testing by Clinician. Review of raw data. Scoring. Report writing of individual tests administered by Clinician. Integration of individual tests administered by psychometrist with NSE/testing by clinician, review of records/history/collaborative information, case Conceptualization, treatment planning, clinical decision making, report writing, coordination Of Care. Psychometry test codes as time spent by psychometrist administering and scoring neurocognitive/psychological tests under supervision of neuropsychologist.  Integral services including scoring of raw data, data interpretation, case conceptualization, report writing etcetera were initiated after the patient finished testing/raw data collected and was completed on the date the report was signed. Please note that this dictation was completed with [employer] voice recognition software. Quite often unanticipated grammatical, syntax, homophones, and other interpretive errors are inadvertently transcribed by the computer software. Please disregard these errors. Please excuse any errors that have escaped final proofreading. Thank you.

## 2022-03-03 DIAGNOSIS — G25.0 ESSENTIAL TREMOR: ICD-10-CM

## 2022-03-03 RX ORDER — PRIMIDONE 250 MG/1
TABLET ORAL
Qty: 225 TABLET | Refills: 3 | Status: SHIPPED | OUTPATIENT
Start: 2022-03-03

## 2022-03-04 ENCOUNTER — PATIENT MESSAGE (OUTPATIENT)
Dept: INTERNAL MEDICINE CLINIC | Age: 87
End: 2022-03-04

## 2022-03-04 DIAGNOSIS — R05.9 COUGH: ICD-10-CM

## 2022-03-04 RX ORDER — IPRATROPIUM BROMIDE 21 UG/1
2 SPRAY, METERED NASAL EVERY 12 HOURS
Qty: 30 ML | Refills: 3 | Status: SHIPPED | OUTPATIENT
Start: 2022-03-04 | End: 2022-03-21

## 2022-03-18 PROBLEM — I25.10 CAD (CORONARY ATHEROSCLEROTIC DISEASE): Status: ACTIVE | Noted: 2019-12-04

## 2022-03-18 PROBLEM — N32.81 OAB (OVERACTIVE BLADDER): Status: ACTIVE | Noted: 2019-10-16

## 2022-03-19 PROBLEM — E78.5 HYPERLIPIDEMIA: Status: ACTIVE | Noted: 2019-10-16

## 2022-03-19 PROBLEM — G25.0 ESSENTIAL TREMOR: Status: ACTIVE | Noted: 2019-10-16

## 2022-03-19 PROBLEM — I35.0 AS (AORTIC STENOSIS): Status: ACTIVE | Noted: 2019-12-20

## 2022-03-19 PROBLEM — I48.0 PAF (PAROXYSMAL ATRIAL FIBRILLATION) (HCC): Status: ACTIVE | Noted: 2020-02-12

## 2022-03-19 PROBLEM — Z95.2 S/P TAVR (TRANSCATHETER AORTIC VALVE REPLACEMENT): Status: ACTIVE | Noted: 2020-01-14

## 2022-03-19 PROBLEM — I25.10 CORONARY ARTERY DISEASE INVOLVING NATIVE HEART WITHOUT ANGINA PECTORIS, UNSPECIFIED VESSEL OR LESION TYPE: Status: ACTIVE | Noted: 2019-11-20

## 2022-03-19 PROBLEM — K21.9 GASTROESOPHAGEAL REFLUX DISEASE: Status: ACTIVE | Noted: 2019-10-16

## 2022-03-19 PROBLEM — Z85.820 HISTORY OF MELANOMA: Status: ACTIVE | Noted: 2019-10-16

## 2022-03-20 PROBLEM — R01.1 MURMUR: Status: ACTIVE | Noted: 2019-10-16

## 2022-03-20 PROBLEM — I35.0 AORTIC VALVE STENOSIS: Status: ACTIVE | Noted: 2019-11-05

## 2022-04-11 RX ORDER — NIACIN 500 MG/1
TABLET, EXTENDED RELEASE ORAL
Qty: 90 TABLET | Refills: 3 | Status: SHIPPED | OUTPATIENT
Start: 2022-04-11

## 2022-04-12 ENCOUNTER — OFFICE VISIT (OUTPATIENT)
Dept: INTERNAL MEDICINE CLINIC | Age: 87
End: 2022-04-12
Payer: MEDICARE

## 2022-04-12 VITALS
SYSTOLIC BLOOD PRESSURE: 118 MMHG | RESPIRATION RATE: 14 BRPM | DIASTOLIC BLOOD PRESSURE: 71 MMHG | BODY MASS INDEX: 25.83 KG/M2 | HEART RATE: 58 BPM | WEIGHT: 164.6 LBS | HEIGHT: 67 IN | TEMPERATURE: 97.5 F | OXYGEN SATURATION: 98 %

## 2022-04-12 DIAGNOSIS — I48.0 PAF (PAROXYSMAL ATRIAL FIBRILLATION) (HCC): ICD-10-CM

## 2022-04-12 DIAGNOSIS — G25.0 ESSENTIAL TREMOR: Primary | ICD-10-CM

## 2022-04-12 DIAGNOSIS — G31.84 MCI (MILD COGNITIVE IMPAIRMENT): ICD-10-CM

## 2022-04-12 DIAGNOSIS — D69.6 THROMBOCYTOPENIA (HCC): ICD-10-CM

## 2022-04-12 DIAGNOSIS — E78.5 HYPERLIPIDEMIA, UNSPECIFIED HYPERLIPIDEMIA TYPE: ICD-10-CM

## 2022-04-12 PROCEDURE — G8536 NO DOC ELDER MAL SCRN: HCPCS | Performed by: INTERNAL MEDICINE

## 2022-04-12 PROCEDURE — G8427 DOCREV CUR MEDS BY ELIG CLIN: HCPCS | Performed by: INTERNAL MEDICINE

## 2022-04-12 PROCEDURE — G0463 HOSPITAL OUTPT CLINIC VISIT: HCPCS | Performed by: INTERNAL MEDICINE

## 2022-04-12 PROCEDURE — G8510 SCR DEP NEG, NO PLAN REQD: HCPCS | Performed by: INTERNAL MEDICINE

## 2022-04-12 PROCEDURE — 1101F PT FALLS ASSESS-DOCD LE1/YR: CPT | Performed by: INTERNAL MEDICINE

## 2022-04-12 PROCEDURE — G8419 CALC BMI OUT NRM PARAM NOF/U: HCPCS | Performed by: INTERNAL MEDICINE

## 2022-04-12 PROCEDURE — 99214 OFFICE O/P EST MOD 30 MIN: CPT | Performed by: INTERNAL MEDICINE

## 2022-04-12 NOTE — PROGRESS NOTES
Earlene Bowling is a 80 y.o. male who presents today for Follow-up (6 month check up)  . He has a history of   Patient Active Problem List   Diagnosis Code    OAB (overactive bladder) N32.81    Hyperlipidemia E78.5    History of melanoma Z85.820    Gastroesophageal reflux disease K21.9    Murmur R01.1    Essential tremor G25.0    Aortic valve stenosis I35.0    Coronary artery disease involving native heart without angina pectoris, unspecified vessel or lesion type I25.10    CAD (coronary atherosclerotic disease) I25.10    AS (aortic stenosis) I35.0    S/P TAVR (transcatheter aortic valve replacement) Z95.2    PAF (paroxysmal atrial fibrillation) ([facility]) I48.0   . Today patient is here for follow-up. Did have neuropsych testing done in December. Did show mild cognitive impairment without any memory loss did show moderate mild anxiety and depression. Also did show chronic ADHD. Patient has not concerned about underlying anxiety or depression. Continues to stay mentally active. Hyperlipidemia: Patient remains on atorvastatin. Followed by cardiology regularly. We will repeat his lipids in 6 months. Continues to take primidone for his essential tremor. He reports that his symptoms have been slowly progressing and affecting quality of life. He has failed other therapies including not being able to tolerate a beta-blocker. Patient will be meeting with neurologist tomorrow. I urged him to discuss other modalities of therapy including nonpharmacological therapies. PAF/CAD/s/p TAVR: Seeing cardiology regularly. Patient has been having progressive thrombocytopenia. We will repeat his platelets today. Does have chronic bruising but does not seem to be getting worse. ROS  Review of Systems   Constitutional: Negative for chills, fever and weight loss. HENT: Negative for congestion and sore throat. Eyes: Negative for blurred vision, double vision and photophobia. Respiratory: Negative for cough and shortness of breath. Cardiovascular: Negative for chest pain, palpitations and leg swelling. Gastrointestinal: Negative for abdominal pain, constipation, diarrhea, heartburn, nausea and vomiting. Genitourinary: Negative for dysuria, frequency and urgency. Musculoskeletal: Negative for joint pain and myalgias. Skin: Negative for rash. Neurological: Positive for tremors. Negative for dizziness, tingling, sensory change, speech change, focal weakness, weakness and headaches. Endo/Heme/Allergies: Does not bruise/bleed easily. Psychiatric/Behavioral: Negative for memory loss and suicidal ideas. Visit Vitals  /71 (BP 1 Location: Left upper arm, BP Patient Position: Sitting, BP Cuff Size: Adult)   Pulse (!) 58   Temp 97.5 °F (36.4 °C) (Oral)   Resp 14   Ht 5' 7\" (1.702 m)   Wt 164 lb 9.6 oz (74.7 kg)   SpO2 98%   BMI 25.78 kg/m²       Physical Exam  Constitutional:       Appearance: He is well-developed. He is not diaphoretic. HENT:      Head: Normocephalic and atraumatic. Right Ear: Tympanic membrane normal.      Left Ear: Tympanic membrane normal.   Eyes:      Pupils: Pupils are equal, round, and reactive to light. Neck:      Vascular: No JVD. Cardiovascular:      Rate and Rhythm: Normal rate and regular rhythm. Heart sounds: No murmur heard. Pulmonary:      Effort: Pulmonary effort is normal. No respiratory distress. Breath sounds: Normal breath sounds. No wheezing. Abdominal:      General: Bowel sounds are normal. There is no distension. Palpations: Abdomen is soft. Tenderness: There is no abdominal tenderness. Musculoskeletal:         General: Normal range of motion. Cervical back: Normal range of motion and neck supple. Skin:     General: Skin is warm and dry. Neurological:      Mental Status: He is alert and oriented to person, place, and time. Cranial Nerves: No cranial nerve deficit. Comments: tremor   Psychiatric:         Behavior: Behavior normal.           Current Outpatient Medications   Medication Sig    niacin ER (NIASPAN) 500 mg tablet TAKE 1 TABLET DAILY    ipratropium (ATROVENT) 42 mcg (0.06 %) nasal spray 2 Sprays by Both Nostrils route three (3) times daily.  primidone (MYSOLINE) 250 mg tablet TAKE 1 TABLET IN THE MORNING AND ONE AND ONE-HALF TABLETS AT BEDTIME    apixaban (Eliquis) 5 mg tablet TAKE 1 TABLET TWICE A DAY    omeprazole (PRILOSEC) 20 mg capsule TAKE 1 CAPSULE DAILY    atorvastatin (LIPITOR) 40 mg tablet TAKE 1 TABLET DAILY    Detrol LA 4 mg ER capsule TAKE 1 CAPSULE DAILY    ascorbic acid, vitamin C, (VITAMIN C) 1,000 mg tablet Take 1 Tab by mouth daily.  multivitamin with minerals (HAIR,SKIN AND NAILS PO) Take  by mouth.  acetaminophen (TYLENOL) 325 mg tablet Take 2 Tabs by mouth every four (4) hours as needed for Pain or Fever.  magnesium oxide (MAG-OX) 400 mg tablet Take 250 mg by mouth daily.  Vit C-Vit E-Copper-ZnOx-Lutein (PRESERVISION LUTEIN) 226 mg-200 unit -5 mg-0.8 mg cap Take  by mouth.  COQ10, UBIQUINOL, PO Take  by mouth.  cholecalciferol (VITAMIN D3) 1,000 unit cap Take 2,000 Units by mouth daily.  cyanocobalamin 1,000 mcg tablet Take 5,000 mcg by mouth daily.  folic acid (FOLVITE) 1 mg tablet Take  by mouth daily.  azithromycin (ZITHROMAX) 500 mg tab Take 1 TAB by mouth 30-60 minutes prior to procedure (Patient not taking: Reported on 4/12/2022)     No current facility-administered medications for this visit.         Past Medical History:   Diagnosis Date    GERD (gastroesophageal reflux disease)     Hyperactivity of bladder     Hypercholesterolemia     Macular degeneration disease     Skin cancer (melanoma) (HCC)     Tremor       Past Surgical History:   Procedure Laterality Date    HX HERNIA REPAIR      HX MOHS PROCEDURES        Social History     Tobacco Use    Smoking status: Never Smoker    Smokeless tobacco: Never Used   Substance Use Topics    Alcohol use: Yes     Alcohol/week: 5.0 standard drinks     Types: 5 Glasses of wine per week     Comment: beer or glass of wine nightly       Family History   Problem Relation Age of Onset    Cancer Father     Lung Cancer Father         smoker    Heart Disease Brother     Diabetes Brother     Diabetes Maternal Grandfather     Prostate Cancer Brother         Allergies   Allergen Reactions    Penicillins Hives        Assessment/Plan  Diagnoses and all orders for this visit:    1. Essential tremor-continues to affect quality of life. Will be meeting with neurologist this week. I urged him to talk about other options including nonpharmacological therapies. He has failed 2 other therapies and is on max dose of current therapy. -     METABOLIC PANEL, COMPREHENSIVE; Future  -     CBC WITH AUTOMATED DIFF; Future    2. Hyperlipidemia, unspecified hyperlipidemia type-continue statin therapy. -     METABOLIC PANEL, COMPREHENSIVE; Future  -     CBC WITH AUTOMATED DIFF; Future    3. PAF (paroxysmal atrial fibrillation) ([facility])-seeing cardiology. Denies any palpitations  -     METABOLIC PANEL, COMPREHENSIVE; Future  -     CBC WITH AUTOMATED DIFF; Future    4. Thrombocytopenia ([facility])-does have some chronic bruising but is not getting worse. I reviewed his trend of his platelets and they are heading down. We will send for a smear  -     CBC WITH AUTOMATED DIFF; Future  -     PATHOLOGIST REVIEW SMEARS; Future    5. MCI (mild cognitive impairment)-Per neuropsych evaluation. Patient denies any symptomatic anxiety or depression. Carmina Villarreal MD  4/12/2022    This note was created with the help of speech recognition software [employer]) and may contain some 'sound alike' errors.

## 2022-04-13 LAB
ALBUMIN SERPL-MCNC: 4 G/DL (ref 3.5–5)
ALBUMIN/GLOB SERPL: 1.3 {RATIO} (ref 1.1–2.2)
ALP SERPL-CCNC: 116 U/L (ref 45–117)
ALT SERPL-CCNC: 31 U/L (ref 12–78)
ANION GAP SERPL CALC-SCNC: 6 MMOL/L (ref 5–15)
AST SERPL-CCNC: 28 U/L (ref 15–37)
BASOPHILS # BLD: 0 K/UL (ref 0–0.1)
BASOPHILS NFR BLD: 1 % (ref 0–1)
BILIRUB SERPL-MCNC: 0.3 MG/DL (ref 0.2–1)
BUN SERPL-MCNC: 14 MG/DL (ref 6–20)
BUN/CREAT SERPL: 21 (ref 12–20)
CALCIUM SERPL-MCNC: 9.1 MG/DL (ref 8.5–10.1)
CHLORIDE SERPL-SCNC: 104 MMOL/L (ref 97–108)
CO2 SERPL-SCNC: 31 MMOL/L (ref 21–32)
CREAT SERPL-MCNC: 0.68 MG/DL (ref 0.7–1.3)
DIFFERENTIAL METHOD BLD: ABNORMAL
EOSINOPHIL # BLD: 0.3 K/UL (ref 0–0.4)
EOSINOPHIL NFR BLD: 5 % (ref 0–7)
ERYTHROCYTE [DISTWIDTH] IN BLOOD BY AUTOMATED COUNT: 14.5 % (ref 11.5–14.5)
GLOBULIN SER CALC-MCNC: 3 G/DL (ref 2–4)
GLUCOSE SERPL-MCNC: 75 MG/DL (ref 65–100)
HCT VFR BLD AUTO: 50.3 % (ref 36.6–50.3)
HGB BLD-MCNC: 16.1 G/DL (ref 12.1–17)
IMM GRANULOCYTES # BLD AUTO: 0 K/UL (ref 0–0.04)
IMM GRANULOCYTES NFR BLD AUTO: 0 % (ref 0–0.5)
LYMPHOCYTES # BLD: 1.9 K/UL (ref 0.8–3.5)
LYMPHOCYTES NFR BLD: 37 % (ref 12–49)
MCH RBC QN AUTO: 30.8 PG (ref 26–34)
MCHC RBC AUTO-ENTMCNC: 32 G/DL (ref 30–36.5)
MCV RBC AUTO: 96.2 FL (ref 80–99)
MONOCYTES # BLD: 0.4 K/UL (ref 0–1)
MONOCYTES NFR BLD: 8 % (ref 5–13)
NEUTS SEG # BLD: 2.5 K/UL (ref 1.8–8)
NEUTS SEG NFR BLD: 49 % (ref 32–75)
NRBC # BLD: 0 K/UL (ref 0–0.01)
NRBC BLD-RTO: 0 PER 100 WBC
PLATELET # BLD AUTO: 115 K/UL (ref 150–400)
PMV BLD AUTO: 12 FL (ref 8.9–12.9)
POTASSIUM SERPL-SCNC: 4.2 MMOL/L (ref 3.5–5.1)
PROT SERPL-MCNC: 7 G/DL (ref 6.4–8.2)
RBC # BLD AUTO: 5.23 M/UL (ref 4.1–5.7)
SODIUM SERPL-SCNC: 141 MMOL/L (ref 136–145)
WBC # BLD AUTO: 5.1 K/UL (ref 4.1–11.1)

## 2022-04-14 ENCOUNTER — OFFICE VISIT (OUTPATIENT)
Dept: NEUROLOGY | Age: 87
End: 2022-04-14
Payer: MEDICARE

## 2022-04-14 VITALS — RESPIRATION RATE: 20 BRPM | SYSTOLIC BLOOD PRESSURE: 110 MMHG | DIASTOLIC BLOOD PRESSURE: 68 MMHG

## 2022-04-14 DIAGNOSIS — G25.0 ESSENTIAL TREMOR: Primary | ICD-10-CM

## 2022-04-14 PROCEDURE — G8432 DEP SCR NOT DOC, RNG: HCPCS | Performed by: PSYCHIATRY & NEUROLOGY

## 2022-04-14 PROCEDURE — G8536 NO DOC ELDER MAL SCRN: HCPCS | Performed by: PSYCHIATRY & NEUROLOGY

## 2022-04-14 PROCEDURE — 99214 OFFICE O/P EST MOD 30 MIN: CPT | Performed by: PSYCHIATRY & NEUROLOGY

## 2022-04-14 PROCEDURE — G8419 CALC BMI OUT NRM PARAM NOF/U: HCPCS | Performed by: PSYCHIATRY & NEUROLOGY

## 2022-04-14 PROCEDURE — G8427 DOCREV CUR MEDS BY ELIG CLIN: HCPCS | Performed by: PSYCHIATRY & NEUROLOGY

## 2022-04-14 PROCEDURE — 1101F PT FALLS ASSESS-DOCD LE1/YR: CPT | Performed by: PSYCHIATRY & NEUROLOGY

## 2022-04-14 NOTE — PROGRESS NOTES
[address] [facility] and [address] at [facility] at [address] [facility], [address] [place] [address] [facility], [address] [place] [address]   [phone]              Chief Complaint   Patient presents with    Follow-up     dosage change      Current Outpatient Medications   Medication Sig Dispense Refill    niacin ER (NIASPAN) 500 mg tablet TAKE 1 TABLET DAILY 90 Tablet 3    ipratropium (ATROVENT) 42 mcg (0.06 %) nasal spray 2 Sprays by Both Nostrils route three (3) times daily. 3 mL 3    primidone (MYSOLINE) 250 mg tablet TAKE 1 TABLET IN THE MORNING AND ONE AND ONE-HALF TABLETS AT BEDTIME 225 Tablet 3    apixaban (Eliquis) 5 mg tablet TAKE 1 TABLET TWICE A  Tablet 3    omeprazole (PRILOSEC) 20 mg capsule TAKE 1 CAPSULE DAILY 90 Capsule 3    atorvastatin (LIPITOR) 40 mg tablet TAKE 1 TABLET DAILY 90 Tablet 3    Detrol LA 4 mg ER capsule TAKE 1 CAPSULE DAILY 90 Cap 3    ascorbic acid, vitamin C, (VITAMIN C) 1,000 mg tablet Take 1 Tab by mouth daily.  multivitamin with minerals (HAIR,SKIN AND NAILS PO) Take  by mouth.  acetaminophen (TYLENOL) 325 mg tablet Take 2 Tabs by mouth every four (4) hours as needed for Pain or Fever. 60 Tab 2    magnesium oxide (MAG-OX) 400 mg tablet Take 250 mg by mouth daily.  Vit C-Vit E-Copper-ZnOx-Lutein (PRESERVISION LUTEIN) 226 mg-200 unit -5 mg-0.8 mg cap Take  by mouth.  COQ10, UBIQUINOL, PO Take  by mouth.  cholecalciferol (VITAMIN D3) 1,000 unit cap Take 2,000 Units by mouth daily.  cyanocobalamin 1,000 mcg tablet Take 5,000 mcg by mouth daily.  folic acid (FOLVITE) 1 mg tablet Take  by mouth daily.         Allergies   Allergen Reactions    Penicillins Hives     Social History     Tobacco Use    Smoking status: Never Smoker    Smokeless tobacco: Never Used   Vaping Use    Vaping Use: Never used   Substance Use Topics    Alcohol use: Yes     Alcohol/week: 5.0 standard drinks     Types: 5 Glasses of wine per week     Comment: beer or glass of wine nightly     Drug use: Never     77-year-old gentleman returns today for follow-up essential tremor. His last visit with me was in September and at that time we increase the Mysoline to a dose of 1-1/2 tablets in the morning, half a tablet at lunch and 2 tablets at bedtime. We also sent him to physical therapy for some imbalance. He has had in the interim a neuropsychological evaluation which did not demonstrate any dementia. He has some attention deficit per Dr. Kailee Asif note. Today he reports he continues to be bothered by the tremor and it may be a bit worse. He is tolerating the medicine. His wife is with him today and she notes that he seems to be slowed and a bit tired with it. He has questions today about surgical options and we discussed deep brain stimulation which at 80 I do not think is the best option for him as it would significantly I think increases potential for bleeding. We also discussed focused ultrasound and the fact that Dr. Loraine Yanez does that in town and that may be an option. I am not sure if there is an age limit with that but certainly that would be an option to do left hemispheric to help relieve the tremor in his dominant right hand which is bothersome with eating and writing    Record review finds a visit with Dr. Destiny Fitzpatrick where he was following up. His neuropsychological testing was reviewed. He was continued on Lipitor for his dyslipidemia. He was continuing to see cardiology for his paroxysmal atrial fibrillation and coronary artery disease status post TAVR. He was having progressive thrombocytopenia and so labs were going to be checked.   Laboratory analysis April 12, 2022  CBC unremarkable except for platelets 874,944 but that is better than the 96,000 he had in October  Metabolic panel unremarkable    Examination  Visit Vitals  /68 (BP 1 Location: Left arm, BP Patient Position: Sitting, BP Cuff Size: Adult)   Resp 20     Pleasant gentleman. He is awake and alert. He is oriented. Normal speech and normal language. Postural tremor of the outstretched hands with intention on finger-nose-finger. No cogwheeling. Impression/Plan  Essential tremor with worsening and question some side effects from the medicine as well as intolerability  Will have him set up to see Dr. Queen Acosta for question of eligibility for focused ultrasound left hemispheric and continue same dose of Mysoline for now      Mary Carrion MD        This note was created using voice recognition software. Despite editing, there may be syntax errors.

## 2022-04-15 ENCOUNTER — PATIENT MESSAGE (OUTPATIENT)
Dept: INTERNAL MEDICINE CLINIC | Age: 87
End: 2022-04-15

## 2022-04-25 RX ORDER — TOLTERODINE TARTRATE 4 MG/1
CAPSULE, EXTENDED RELEASE ORAL
Qty: 90 CAPSULE | Refills: 3 | Status: SHIPPED | OUTPATIENT
Start: 2022-04-25

## 2022-04-28 NOTE — PROGRESS NOTES
Helene Hoyt MD    [address] [place], [address] [facility]    Office [phone],IJO [phone]            Dear Dr Mayelin Ramirez,    I had the pleasure of seeing Mr. Ryan Carter in the office today. Assessment:  Severe AS- S/p TAVR  CAD - s/p LAD stent  PAF  HLD  Hx of GERD  Sinus bradycardia  Tremors/Balance issues - followed by neurology       Plan:     Not on B blockers sec to bradycardia. HR in 50-60's off B blockers  On Eliquis  Aggressive cardiovascular risk factor modification. F/u 6 mths/earlier prn          Medication Side Effects and Warnings were discussed with patient: yes  Patient Labs were reviewed and or requested:  yes  Patient Past Records were reviewed and or requested: yes    I appreciate the opportunity to be involved in . Please see note below for details. Please do not hesitate to contact us with questions or concerns. Helene Hoyt MD    Cardiac Testing/ Procedures: A. Cardiac Cath/PCI:12/4/19 ( Dr John Acevedo) Findings:  1)Severe mid LAD calcified disease  2)S/p PCI with 3 by 38 mm Xience EFFIE placement following Rotational atherectomy with 1.75 mm [place]. Post dilatation with 3.25 mm Bballoon at 20-24 KYRA. Good final result. Moderate distal disease deferred.     Access: Right radial--no issues     11/6/19 R Brachial vein access - 7 F sheath  R Radial access - 6 F sheath     Calcified coronaries  RCA - 3DRC; LCA - JL4  Codominant     L Main: Large; MLI     LAD: Prox - large; Mid/Distal Med; Ostial 30%; Mid - Ca+++; 80% ( haziness prob sec to Ca+); Another mid lesion - 70% ; Small to med D1- Prox 30%;  Small  D2; Large tortuous septal     LCflex: Med to large; MLI; OM1 - Med to large; Prox 50%     RCA: Med to large; Prox/Mid - MLI; Distal at bifurcation 60-70%- small PDA/PLB     LVEDP: valve not crossed     LVEF: not assessed     No significant gradient across aortic valve.           RHC findings:     RAPm= 4      mmHg  RVSP= 19 mmHg  PAPm= 14       mmHg  PCWPm=7    mmHg  CO=   6.02       l/min  CI=3.08     Specimens Removed : None     Complications: None     Closure Device: TR    B.ECHO/SHARAN:  7/28/20 · LV: Normal cavity size and systolic function (ejection fraction normal). Mildly increased wall thickness. Calculated left ventricular ejection fraction is 61%. Biplane method used to measure ejection fraction. Mild (grade 1) left ventricular diastolic dysfunction. · LA: Moderately dilated left atrium. · AV: Prosthetic aortic valve. Aortic valve mean gradient is 9.9 mmHg. There is a 26 mm KIM prosthetic aortic valve from prior TAVR procedure. Mild perivalvular aortic valve regurgitation is present. · MV: Mitral valve thickening. Mild mitral valve regurgitation is present. · TV: Mild tricuspid valve regurgitation is present. TR gradient = 41 mmHg; unable to assess IVC for RAP    1/14/20 Normal cavity size and systolic function (ejection fraction normal). Mild concentric hypertrophy. Calculated left ventricular ejection fraction is 63%. Biplane method used to measure ejection fraction. No regional wall motion abnormality noted. Mild (grade 1) left ventricular diastolic dysfunction. · Prosthetic aortic valve. There is a 26 mm Watson KIM prosthetic aortic valve from prior TAVR procedure. Prosthesis is normal. Trace paravalvular leak is present. Gradients are appropriate for this valve. Mitral valve thickening. Mild mitral annular calcification. Mild mitral valve regurgitation is present. 10/23/19 Left Ventricle: Normal cavity size and systolic function (ejection fraction normal). Mildly increased wall thickness. Estimated left ventricular ejection fraction is 56 - 60%. No regional wall motion abnormality noted. Mild (grade 1) left ventricular diastolic dysfunction. · Mitral Valve: Mitral annular calcification. Mild mitral valve regurgitation is present. · Left Atrium: Dilated left atrium.   · Tricuspid Valve: Mild tricuspid valve regurgitation is present. · Aortic Valve: Severe aortic valve sclerosis with reduced excursion. Aortic valve leaflet calcification present with reduced excursion. Severe aortic valve stenosis is present. Mild to moderate aortic valve regurgitation is present. Aortic Valve Systolic Mean Gradient 56 mm Hg; AV peak gradient ( pedhoff) -74 mm Hg    C.StressNuclear/Stress ECHO/Stress test:    D.Vascular: 11/5/19 Carotid doppler 1-59% bilat ICA    E. EP: Event monitor-7-day 1/24/2020to  1/30/2020-0 critical, 0 serious and 2 stable events. Sinus bradycardia 1/27/2020-2:02 PM 38 bpm.-Auto detected. F. Miscellaneous: 12/20/19 1. Angiography of the ascending aorta and aortoiliac bifurcation  2. Temporary transvenous pacemaker insertion  3. Left heart catheterization  4. Implantation of a 26 mm Watson S3 transcatheter aortic valve via the right transfemoral approach  5. 14F right?femoral artery access with Perclose pre-closure / 6F left femoral vein access with manual compression / 6F left femoral artery access with Perclose closure    Subjective:    C/o feeling tired. He is walking in his neighborhood 34 mile - slowing down. No chest pain/edema/palpitations/syncope. Tremors getting worse      ROS:  (bold if positive, if negative)           Medications before admission:    Current Outpatient Medications   Medication Sig Dispense    Detrol LA 4 mg ER capsule TAKE 1 CAPSULE DAILY 90 Capsule    niacin ER (NIASPAN) 500 mg tablet TAKE 1 TABLET DAILY 90 Tablet    ipratropium (ATROVENT) 42 mcg (0.06 %) nasal spray 2 Sprays by Both Nostrils route three (3) times daily.  3 mL    primidone (MYSOLINE) 250 mg tablet TAKE 1 TABLET IN THE MORNING AND ONE AND ONE-HALF TABLETS AT BEDTIME 225 Tablet    apixaban (Eliquis) 5 mg tablet TAKE 1 TABLET TWICE A  Tablet    omeprazole (PRILOSEC) 20 mg capsule TAKE 1 CAPSULE DAILY 90 Capsule    atorvastatin (LIPITOR) 40 mg tablet TAKE 1 TABLET DAILY 90 Tablet    ascorbic acid, vitamin C, (VITAMIN C) 1,000 mg tablet Take 1 Tab by mouth daily.  multivitamin with minerals (HAIR,SKIN AND NAILS PO) Take  by mouth.  acetaminophen (TYLENOL) 325 mg tablet Take 2 Tabs by mouth every four (4) hours as needed for Pain or Fever. 60 Tab    magnesium oxide (MAG-OX) 400 mg tablet Take 250 mg by mouth daily.  Vit C-Vit E-Copper-ZnOx-Lutein (PRESERVISION LUTEIN) 226 mg-200 unit -5 mg-0.8 mg cap Take  by mouth.  COQ10, UBIQUINOL, PO Take  by mouth.  cholecalciferol (VITAMIN D3) 1,000 unit cap Take 2,000 Units by mouth daily.  cyanocobalamin 1,000 mcg tablet Take 5,000 mcg by mouth daily.  folic acid (FOLVITE) 1 mg tablet Take  by mouth daily. No current facility-administered medications for this visit. Family History of CAD:    Yes    Social History:  Current  Smoker No    Physical Exam:  Visit Vitals  /78 (BP 1 Location: Left upper arm, BP Patient Position: Sitting, BP Cuff Size: Adult)   Pulse 62   Resp 18   Ht 5' 7\" (1.702 m)   Wt 162 lb 9.6 oz (73.8 kg)   SpO2 96%   BMI 25.47 kg/m²        Gen:    Well-developed, well-nourished, in no acute distress  HEENT:  Pink conjunctivae, hearing intact to voice, moist mucous membranes  Neck:  Supple,No JVD, No Carotid Bruit, Thyroid- non tender  Resp:  No accessory muscle use, Clear breath sounds, No rales or rhonchi  Card:   Regular Rate,Rythm,Normal S1, S2,2/6 sys murmur+,No rubs or gallop. No thrills. Abd:  Soft,  normoactive bowel sounds are present,   MSK:   No cyanosis  Skin:   No rashes or ulcers  Neuro:  moving all four extremities, no focal deficit, follows commands appropriately, Tremors UE  Psych:  Good insight, oriented to person, place and time, alert, Nml Affect  LE: No edema  Vascular: Radial Pulses 2+ and symmetric      EKG:        LABS: LDL 75 3/2020        ATTENTION:   This medical record was transcribed using an electronic medical records/speech recognition system.   Although proofread, it may and can contain electronic, spelling and other errors. Corrections may be executed at a later time. Please feel free to contact us for any clarifications as needed.       Keysha Linares MD

## 2022-04-29 ENCOUNTER — OFFICE VISIT (OUTPATIENT)
Dept: CARDIOLOGY CLINIC | Age: 87
End: 2022-04-29
Payer: MEDICARE

## 2022-04-29 VITALS
HEART RATE: 62 BPM | SYSTOLIC BLOOD PRESSURE: 110 MMHG | HEIGHT: 67 IN | BODY MASS INDEX: 25.52 KG/M2 | RESPIRATION RATE: 18 BRPM | WEIGHT: 162.6 LBS | OXYGEN SATURATION: 96 % | DIASTOLIC BLOOD PRESSURE: 78 MMHG

## 2022-04-29 DIAGNOSIS — G25.0 ESSENTIAL TREMOR: ICD-10-CM

## 2022-04-29 DIAGNOSIS — E78.5 HYPERLIPIDEMIA, UNSPECIFIED HYPERLIPIDEMIA TYPE: ICD-10-CM

## 2022-04-29 DIAGNOSIS — I25.10 CORONARY ARTERY DISEASE INVOLVING NATIVE CORONARY ARTERY OF NATIVE HEART WITHOUT ANGINA PECTORIS: ICD-10-CM

## 2022-04-29 DIAGNOSIS — I48.0 PAF (PAROXYSMAL ATRIAL FIBRILLATION) (HCC): Primary | ICD-10-CM

## 2022-04-29 DIAGNOSIS — Z95.2 S/P TAVR (TRANSCATHETER AORTIC VALVE REPLACEMENT): ICD-10-CM

## 2022-04-29 PROCEDURE — 99214 OFFICE O/P EST MOD 30 MIN: CPT | Performed by: INTERNAL MEDICINE

## 2022-04-29 PROCEDURE — G8419 CALC BMI OUT NRM PARAM NOF/U: HCPCS | Performed by: INTERNAL MEDICINE

## 2022-04-29 PROCEDURE — G8432 DEP SCR NOT DOC, RNG: HCPCS | Performed by: INTERNAL MEDICINE

## 2022-04-29 PROCEDURE — G0463 HOSPITAL OUTPT CLINIC VISIT: HCPCS | Performed by: INTERNAL MEDICINE

## 2022-04-29 PROCEDURE — 1101F PT FALLS ASSESS-DOCD LE1/YR: CPT | Performed by: INTERNAL MEDICINE

## 2022-04-29 PROCEDURE — G8427 DOCREV CUR MEDS BY ELIG CLIN: HCPCS | Performed by: INTERNAL MEDICINE

## 2022-04-29 PROCEDURE — G8536 NO DOC ELDER MAL SCRN: HCPCS | Performed by: INTERNAL MEDICINE

## 2022-04-29 NOTE — PROGRESS NOTES
Room # 6  · 6 Month F/U  · PCP in [address] [facility]  · CMP/CBC 4/12  · Lipids 2021    Wesley Barrera is a 80 y.o. male    Chief Complaint   Patient presents with    Irregular Heart Beat     Atrial Fibrillation    Follow-up     Increased fatigue \"tire easily\"  Balance issues with walking, shaking  Chest pain No    SOB NO    Dizziness NO    Swelling NO    Refills NO    Visit Vitals  /78 (BP 1 Location: Left upper arm, BP Patient Position: Sitting, BP Cuff Size: Adult)   Pulse 62   Resp 18   Ht 5' 7\" (1.702 m)   Wt 162 lb 9.6 oz (73.8 kg)   SpO2 96%   BMI 25.47 kg/m²       1. Have you been to the ER, urgent care clinic since your last visit? Hospitalized since your last visit? No    2. Have you seen or consulted any other health care providers outside of the [address] [place] [address] since your last visit? Include any pap smears or colon screening.   No

## 2022-04-29 NOTE — LETTER
4/29/2022    Patient: Darren Beck   YOB: 1934   Date of Visit: 4/29/2022     Will Lloyd, [address] [facility] [address]  [address] [place] [address]  Via In [facility] [address]    Dear Will Lloyd MD,      Thank you for referring Mr. Missy Yang to [facility] for evaluation. My notes for this consultation are attached. If you have questions, please do not hesitate to call me. I look forward to following your patient along with you.       Sincerely,    Grayson Fuentes MD

## 2022-08-09 DIAGNOSIS — E78.5 HYPERLIPIDEMIA, UNSPECIFIED HYPERLIPIDEMIA TYPE: ICD-10-CM

## 2022-08-09 DIAGNOSIS — I25.10 CORONARY ARTERY DISEASE INVOLVING NATIVE CORONARY ARTERY OF NATIVE HEART WITHOUT ANGINA PECTORIS: ICD-10-CM

## 2022-08-09 RX ORDER — ATORVASTATIN CALCIUM 40 MG/1
TABLET, FILM COATED ORAL
Qty: 90 TABLET | Refills: 3 | Status: SHIPPED | OUTPATIENT
Start: 2022-08-09

## 2022-08-23 ENCOUNTER — TELEPHONE (OUTPATIENT)
Dept: INTERNAL MEDICINE CLINIC | Age: 87
End: 2022-08-23

## 2022-08-23 NOTE — TELEPHONE ENCOUNTER
PSR called patient and spoke with him regarding rescheduling his appointment as his PCP had to leave early on 8/25. Patient was thankful for the call.

## 2022-08-26 ENCOUNTER — TELEPHONE (OUTPATIENT)
Dept: INTERNAL MEDICINE CLINIC | Age: 87
End: 2022-08-26

## 2022-08-26 ENCOUNTER — OFFICE VISIT (OUTPATIENT)
Dept: INTERNAL MEDICINE CLINIC | Age: 87
End: 2022-08-26
Payer: MEDICARE

## 2022-08-26 ENCOUNTER — HOSPITAL ENCOUNTER (OUTPATIENT)
Dept: GENERAL RADIOLOGY | Age: 87
Discharge: HOME | End: 2022-08-26
Payer: MEDICARE

## 2022-08-26 VITALS
RESPIRATION RATE: 12 BRPM | HEART RATE: 61 BPM | WEIGHT: 156.4 LBS | DIASTOLIC BLOOD PRESSURE: 77 MMHG | TEMPERATURE: 97.4 F | SYSTOLIC BLOOD PRESSURE: 124 MMHG | HEIGHT: 67 IN | OXYGEN SATURATION: 96 % | BODY MASS INDEX: 24.55 KG/M2

## 2022-08-26 DIAGNOSIS — R06.09 DOE (DYSPNEA ON EXERTION): ICD-10-CM

## 2022-08-26 DIAGNOSIS — R06.89 DECREASED BREATH SOUNDS AT RIGHT LUNG BASE: Primary | ICD-10-CM

## 2022-08-26 DIAGNOSIS — R06.89 DECREASED BREATH SOUNDS AT RIGHT LUNG BASE: ICD-10-CM

## 2022-08-26 DIAGNOSIS — G25.0 ESSENTIAL TREMOR: ICD-10-CM

## 2022-08-26 DIAGNOSIS — I48.0 PAF (PAROXYSMAL ATRIAL FIBRILLATION) (HCC): ICD-10-CM

## 2022-08-26 PROCEDURE — G8427 DOCREV CUR MEDS BY ELIG CLIN: HCPCS | Performed by: INTERNAL MEDICINE

## 2022-08-26 PROCEDURE — G8420 CALC BMI NORM PARAMETERS: HCPCS | Performed by: INTERNAL MEDICINE

## 2022-08-26 PROCEDURE — G0463 HOSPITAL OUTPT CLINIC VISIT: HCPCS | Performed by: INTERNAL MEDICINE

## 2022-08-26 PROCEDURE — G8510 SCR DEP NEG, NO PLAN REQD: HCPCS | Performed by: INTERNAL MEDICINE

## 2022-08-26 PROCEDURE — 71046 X-RAY EXAM CHEST 2 VIEWS: CPT

## 2022-08-26 PROCEDURE — G8536 NO DOC ELDER MAL SCRN: HCPCS | Performed by: INTERNAL MEDICINE

## 2022-08-26 PROCEDURE — 99214 OFFICE O/P EST MOD 30 MIN: CPT | Performed by: INTERNAL MEDICINE

## 2022-08-26 PROCEDURE — 1101F PT FALLS ASSESS-DOCD LE1/YR: CPT | Performed by: INTERNAL MEDICINE

## 2022-08-26 NOTE — PROGRESS NOTES
[employer]  Identified pt with two pt identifiers(name and ). Chief Complaint   Patient presents with    Chest Congestion     RM18// pt presenting today for congestion in lungs per pt he was seen at [facility] this week and suggested  to have lungs evaluated       1. Have you been to the ER, urgent care clinic since your last visit? Hospitalized since your last visit? NO    2. Have you seen or consulted any other health care providers outside of the [address] [place] [address] since your last visit? Include any pap smears or colon screening. NO      Provider notified of reason for visit, vitals and flowsheets obtained on patients. Patient received paperwork for advance directive during previous visit but has not completed at this time     Reviewed record In preparation for visit, huddled with provider and have obtained necessary documentation      There are no preventive care reminders to display for this patient. Wt Readings from Last 3 Encounters:   22 156 lb 6.4 oz (70.9 kg)   22 162 lb 9.6 oz (73.8 kg)   22 164 lb 9.6 oz (74.7 kg)     Temp Readings from Last 3 Encounters:   22 97.4 °F (36.3 °C) (Oral)   22 97.5 °F (36.4 °C) (Oral)   10/14/21 97.4 °F (36.3 °C) (Oral)     BP Readings from Last 3 Encounters:   22 124/77   22 110/78   22 110/68     Pulse Readings from Last 3 Encounters:   22 61   22 62   22 (!) 58     Vitals:    22 1150   BP: 124/77   Pulse: 61   Resp: 12   Temp: 97.4 °F (36.3 °C)   TempSrc: Oral   SpO2: 96%   Weight: 156 lb 6.4 oz (70.9 kg)   Height: 5' 7\" (1.702 m)   PainSc:   0 - No pain         Learning Assessment:  :     No flowsheet data found.     Depression Screening:  :     3 most recent PHQ Screens 2022   Little interest or pleasure in doing things Not at all   Feeling down, depressed, irritable, or hopeless Not at all   Total Score PHQ 2 0   Trouble falling or staying asleep, or sleeping too much -   Feeling tired or having little energy -   Poor appetite, weight loss, or overeating -   Feeling bad about yourself - or that you are a failure or have let yourself or your family down -   Trouble concentrating on things such as school, work, reading, or watching TV -   Moving or speaking so slowly that other people could have noticed; or the opposite being so fidgety that others notice -   Thoughts of being better off dead, or hurting yourself in some way -   PHQ 9 Score -   How difficult have these problems made it for you to do your work, take care of your home and get along with others -       Fall Risk Assessment:  :     Fall Risk Assessment, last 12 mths 4/29/2022   Able to walk? Yes   Fall in past 12 months? 0   Do you feel unsteady? 0   Are you worried about falling 0   Is the gait abnormal? -   Number of falls in past 12 months -       Abuse Screening:  :     Abuse Screening Questionnaire 12/15/2020 6/23/2020 1/21/2020 12/11/2019 11/20/2019 10/16/2019   Do you ever feel afraid of your partner? N N N N N N   Are you in a relationship with someone who physically or mentally threatens you? N N N N N N   Is it safe for you to go home? Y Y Y Y Y Y       ADL Screening:  :     No flowsheet data found. Medication reconciliation up to date and corrected with patient at this time.

## 2022-08-26 NOTE — PROGRESS NOTES
Raquel Chen is a 80 y.o. male who presents today for Chest Congestion (RM18// pt presenting today for congestion in lungs per pt he was seen at [facility] this week and suggested  to have lungs evaluated)  . He has a history of   Patient Active Problem List   Diagnosis Code    OAB (overactive bladder) N32.81    Hyperlipidemia E78.5    History of melanoma Z85.820    Gastroesophageal reflux disease K21.9    Murmur R01.1    Essential tremor G25.0    Aortic valve stenosis I35.0    Coronary artery disease involving native heart without angina pectoris, unspecified vessel or lesion type I25.10    CAD (coronary atherosclerotic disease) I25.10    AS (aortic stenosis) I35.0    S/P TAVR (transcatheter aortic valve replacement) Z95.2    PAF (paroxysmal atrial fibrillation) (HCC) I48.0    MCI (mild cognitive impairment) G31.84   . Today patient is here for an acute visit. he does not have other concerns. Upper respiratory illness:  Raquel Chen presents with complaints of being told that he had some changes to his lungs. This was told to him by the NS recently. A bit more winded/on exertion. Still walking at least a mile a day. Denies any lower extremity edema. Denies any orthopnea. No chest pain. Denies any infectious symptoms. Denies any new palpitations. ET: seeing NS for potential focused US for ET. Getting initial imaging studies done in mid September. ROS  Review of Systems   Constitutional:  Negative for chills, fever and weight loss. HENT:  Negative for congestion and sore throat. Eyes:  Negative for blurred vision, double vision and photophobia. Respiratory:  Positive for shortness of breath (minimal). Negative for cough. Cardiovascular:  Negative for chest pain, palpitations and leg swelling. Gastrointestinal:  Negative for abdominal pain, constipation, diarrhea, heartburn, nausea and vomiting. Genitourinary:  Negative for dysuria, frequency and urgency. Musculoskeletal:  Negative for joint pain and myalgias. Skin:  Negative for rash. Neurological:  Positive for tremors. Negative for headaches. Endo/Heme/Allergies:  Does not bruise/bleed easily. Psychiatric/Behavioral:  Negative for memory loss and suicidal ideas. Visit Vitals  /77 (BP 1 Location: Left upper arm, BP Patient Position: Sitting, BP Cuff Size: Adult)   Pulse 61   Temp 97.4 °F (36.3 °C) (Oral)   Resp 12   Ht 5' 7\" (1.702 m)   Wt 156 lb 6.4 oz (70.9 kg)   SpO2 96%   BMI 24.50 kg/m²       Physical Exam  Constitutional:       Appearance: He is well-developed. He is not diaphoretic. HENT:      Head: Normocephalic and atraumatic. Eyes:      Pupils: Pupils are equal, round, and reactive to light. Cardiovascular:      Rate and Rhythm: Normal rate and regular rhythm. Heart sounds: No murmur heard. Pulmonary:      Effort: Pulmonary effort is normal. No respiratory distress. Breath sounds: Normal breath sounds. Comments: Crackles to right lower lung bases. Otherwise lungs are clear. Musculoskeletal:         General: Normal range of motion. Skin:     General: Skin is warm and dry. Neurological:      Mental Status: He is alert and oriented to person, place, and time. Comments: tremor   Psychiatric:         Behavior: Behavior normal.         Current Outpatient Medications   Medication Sig    atorvastatin (LIPITOR) 40 mg tablet TAKE 1 TABLET DAILY    Detrol LA 4 mg ER capsule TAKE 1 CAPSULE DAILY    niacin ER (NIASPAN) 500 mg tablet TAKE 1 TABLET DAILY    ipratropium (ATROVENT) 42 mcg (0.06 %) nasal spray 2 Sprays by Both Nostrils route three (3) times daily. primidone (MYSOLINE) 250 mg tablet TAKE 1 TABLET IN THE MORNING AND ONE AND ONE-HALF TABLETS AT BEDTIME    apixaban (Eliquis) 5 mg tablet TAKE 1 TABLET TWICE A DAY    omeprazole (PRILOSEC) 20 mg capsule TAKE 1 CAPSULE DAILY    ascorbic acid, vitamin C, (VITAMIN C) 1,000 mg tablet Take 1 Tab by mouth daily. multivitamin with minerals (HAIR,SKIN AND NAILS PO) Take  by mouth. acetaminophen (TYLENOL) 325 mg tablet Take 2 Tabs by mouth every four (4) hours as needed for Pain or Fever. magnesium oxide (MAG-OX) 400 mg tablet Take 250 mg by mouth daily. Vit C-Vit E-Copper-ZnOx-Lutein 226-90-0.8-5 mg cap Take  by mouth. COQ10, UBIQUINOL, PO Take  by mouth. cholecalciferol (VITAMIN D3) 25 mcg (1,000 unit) cap Take 2,000 Units by mouth daily. cyanocobalamin 1,000 mcg tablet Take 5,000 mcg by mouth daily. folic acid (FOLVITE) 1 mg tablet Take  by mouth daily. No current facility-administered medications for this visit. Past Medical History:   Diagnosis Date    CAD (coronary artery disease)     stint    GERD (gastroesophageal reflux disease)     Hyperactivity of bladder     Hypercholesterolemia     Macular degeneration disease     Skin cancer (melanoma) (HCC)     Tremor       Past Surgical History:   Procedure Laterality Date    HX HERNIA REPAIR      HX MOHS PROCEDURES      SC CARDIAC SURG PROCEDURE UNLIST  2019    TAVR      Social History     Tobacco Use    Smoking status: Never    Smokeless tobacco: Never   Substance Use Topics    Alcohol use: Not Currently     Comment: beer or glass of wine nightly       Family History   Problem Relation Age of Onset    Cancer Father             Lung Cancer Father         smoker    Heart Disease Father     Heart Disease Brother     Diabetes Brother     Cancer Brother             Gall Bladder Disease Brother     Diabetes Maternal Grandfather     Prostate Cancer Brother     Migraines Mother                 Allergies   Allergen Reactions    Penicillins Hives        Assessment/Plan  Diagnoses and all orders for this visit:    1. Decreased breath sounds at right lung base-does have decreased breath sounds and does have a mild decrease in exercise tolerance. We will check a chest rate and blood work today.   Denies any lower extremity edema or other volume overload symptoms. Patient is in the process of being evaluated for focused ultrasound treatment for his essential tremor. We will copy neurosurgeon on these results. -     METABOLIC PANEL, BASIC; Future  -     NT-PRO BNP; Future  -     CBC WITH AUTOMATED DIFF; Future  -     XR CHEST PA LAT; Future    2. THEODORE (dyspnea on exertion)  -     METABOLIC PANEL, BASIC; Future  -     NT-PRO BNP; Future  -     CBC WITH AUTOMATED DIFF; Future  -     XR CHEST PA LAT; Future    3. Essential tremor    4. PAF (paroxysmal atrial fibrillation) (HCC)-on oral anticoagulation. Brendan Lock MD  8/26/2022    This note was created with the help of speech recognition software [employer]) and may contain some 'sound alike' errors.

## 2022-08-27 DIAGNOSIS — R93.89 ABNORMAL CHEST X-RAY: ICD-10-CM

## 2022-08-27 DIAGNOSIS — R06.09 DOE (DYSPNEA ON EXERTION): ICD-10-CM

## 2022-08-27 DIAGNOSIS — J84.10 LUNG FIBROSIS (HCC): Primary | ICD-10-CM

## 2022-08-27 LAB
ANION GAP SERPL CALC-SCNC: 5 MMOL/L (ref 5–15)
BASOPHILS # BLD: 0.1 K/UL (ref 0–0.1)
BASOPHILS NFR BLD: 1 % (ref 0–1)
BNP SERPL-MCNC: 70 PG/ML
BUN SERPL-MCNC: 20 MG/DL (ref 6–20)
BUN/CREAT SERPL: 26 (ref 12–20)
CALCIUM SERPL-MCNC: 9.3 MG/DL (ref 8.5–10.1)
CHLORIDE SERPL-SCNC: 106 MMOL/L (ref 97–108)
CO2 SERPL-SCNC: 31 MMOL/L (ref 21–32)
COMMENT, HOLDF: NORMAL
CREAT SERPL-MCNC: 0.77 MG/DL (ref 0.7–1.3)
DIFFERENTIAL METHOD BLD: ABNORMAL
EOSINOPHIL # BLD: 0.2 K/UL (ref 0–0.4)
EOSINOPHIL NFR BLD: 4 % (ref 0–7)
ERYTHROCYTE [DISTWIDTH] IN BLOOD BY AUTOMATED COUNT: 14.4 % (ref 11.5–14.5)
GLUCOSE SERPL-MCNC: 74 MG/DL (ref 65–100)
HCT VFR BLD AUTO: 47.1 % (ref 36.6–50.3)
HGB BLD-MCNC: 15.5 G/DL (ref 12.1–17)
IMM GRANULOCYTES # BLD AUTO: 0 K/UL (ref 0–0.04)
IMM GRANULOCYTES NFR BLD AUTO: 0 % (ref 0–0.5)
LYMPHOCYTES # BLD: 2 K/UL (ref 0.8–3.5)
LYMPHOCYTES NFR BLD: 30 % (ref 12–49)
MCH RBC QN AUTO: 30.8 PG (ref 26–34)
MCHC RBC AUTO-ENTMCNC: 32.9 G/DL (ref 30–36.5)
MCV RBC AUTO: 93.5 FL (ref 80–99)
MONOCYTES # BLD: 0.5 K/UL (ref 0–1)
MONOCYTES NFR BLD: 8 % (ref 5–13)
NEUTS SEG # BLD: 3.8 K/UL (ref 1.8–8)
NEUTS SEG NFR BLD: 57 % (ref 32–75)
NRBC # BLD: 0 K/UL (ref 0–0.01)
NRBC BLD-RTO: 0 PER 100 WBC
PLATELET # BLD AUTO: 114 K/UL (ref 150–400)
PMV BLD AUTO: 11.5 FL (ref 8.9–12.9)
POTASSIUM SERPL-SCNC: 4.4 MMOL/L (ref 3.5–5.1)
RBC # BLD AUTO: 5.04 M/UL (ref 4.1–5.7)
SAMPLES BEING HELD,HOLD: NORMAL
SODIUM SERPL-SCNC: 142 MMOL/L (ref 136–145)
WBC # BLD AUTO: 6.6 K/UL (ref 4.1–11.1)

## 2022-08-30 ENCOUNTER — HOSPITAL ENCOUNTER (OUTPATIENT)
Dept: CT IMAGING | Age: 87
Discharge: HOME | End: 2022-08-30
Attending: INTERNAL MEDICINE
Payer: MEDICARE

## 2022-08-30 DIAGNOSIS — R06.09 DOE (DYSPNEA ON EXERTION): ICD-10-CM

## 2022-08-30 DIAGNOSIS — J84.10 LUNG FIBROSIS (HCC): ICD-10-CM

## 2022-08-30 DIAGNOSIS — R93.89 ABNORMAL CHEST X-RAY: ICD-10-CM

## 2022-08-30 PROCEDURE — 71250 CT THORAX DX C-: CPT

## 2022-09-01 ENCOUNTER — TELEPHONE (OUTPATIENT)
Dept: INTERNAL MEDICINE CLINIC | Age: 87
End: 2022-09-01

## 2022-09-01 DIAGNOSIS — J84.10 PULMONARY FIBROSIS (HCC): Primary | ICD-10-CM

## 2022-09-01 NOTE — TELEPHONE ENCOUNTER
Reviewed CT findings over the phone with patient and his wife. He does have fibrosis on his CT scan. After review of 2019 CT scan there was pretty significant fibrosis at that time. It seems to have progressed a bit. He denies any asbestos exposures in the past.  Reports that shortness of breath is relatively stable. Angela Aiken can you please call and make him an appointment with Shruthi Pineda, I have placed a referral in his chart. Please fax results of CT scan. Kidney also reach out to the neurosurgeon Dr. Silverio [place] let them know that this does not preclude him from pursuing therapy for his essential tremor. If they need any further message from me please let me know.

## 2022-09-02 NOTE — TELEPHONE ENCOUNTER
Scheduled appointment with Dr. Christopher Boxer   09/12/2022 8:30 am new address   [phone] [facility] . Patient notified.

## 2022-09-02 NOTE — TELEPHONE ENCOUNTER
Dr. Henna Montenegro nurse needed note on script pad ct findings, not contraindicated with pursuing therapy for essential tremor. Faxed to [phone].

## 2022-09-08 ENCOUNTER — TELEPHONE (OUTPATIENT)
Dept: INTERNAL MEDICINE CLINIC | Age: 87
End: 2022-09-08

## 2022-09-08 NOTE — TELEPHONE ENCOUNTER
Patient will see Dr. Miguel Tenorio on the 12th for more explanation of recent CT. Patient canceled appointment 09/08/2022.

## 2022-10-14 ENCOUNTER — OFFICE VISIT (OUTPATIENT)
Dept: CARDIOLOGY CLINIC | Age: 87
End: 2022-10-14
Payer: MEDICARE

## 2022-10-14 ENCOUNTER — ANCILLARY PROCEDURE (OUTPATIENT)
Dept: CARDIOLOGY CLINIC | Age: 87
End: 2022-10-14
Payer: MEDICARE

## 2022-10-14 VITALS
WEIGHT: 161 LBS | HEIGHT: 67 IN | DIASTOLIC BLOOD PRESSURE: 68 MMHG | SYSTOLIC BLOOD PRESSURE: 122 MMHG | BODY MASS INDEX: 25.27 KG/M2

## 2022-10-14 VITALS
HEART RATE: 60 BPM | DIASTOLIC BLOOD PRESSURE: 68 MMHG | BODY MASS INDEX: 25.27 KG/M2 | HEIGHT: 67 IN | WEIGHT: 161 LBS | SYSTOLIC BLOOD PRESSURE: 122 MMHG | OXYGEN SATURATION: 92 %

## 2022-10-14 DIAGNOSIS — Z95.2 S/P AVR (AORTIC VALVE REPLACEMENT): ICD-10-CM

## 2022-10-14 DIAGNOSIS — E78.2 MIXED HYPERLIPIDEMIA: ICD-10-CM

## 2022-10-14 DIAGNOSIS — I25.10 ATHEROSCLEROSIS OF NATIVE CORONARY ARTERY OF NATIVE HEART WITHOUT ANGINA PECTORIS: Primary | ICD-10-CM

## 2022-10-14 DIAGNOSIS — I35.0 NONRHEUMATIC AORTIC VALVE STENOSIS: ICD-10-CM

## 2022-10-14 DIAGNOSIS — I48.0 PAF (PAROXYSMAL ATRIAL FIBRILLATION) (HCC): ICD-10-CM

## 2022-10-14 PROCEDURE — 1101F PT FALLS ASSESS-DOCD LE1/YR: CPT | Performed by: INTERNAL MEDICINE

## 2022-10-14 PROCEDURE — G8417 CALC BMI ABV UP PARAM F/U: HCPCS | Performed by: INTERNAL MEDICINE

## 2022-10-14 PROCEDURE — 1123F ACP DISCUSS/DSCN MKR DOCD: CPT | Performed by: INTERNAL MEDICINE

## 2022-10-14 PROCEDURE — G8432 DEP SCR NOT DOC, RNG: HCPCS | Performed by: INTERNAL MEDICINE

## 2022-10-14 PROCEDURE — G0463 HOSPITAL OUTPT CLINIC VISIT: HCPCS | Performed by: INTERNAL MEDICINE

## 2022-10-14 PROCEDURE — G8427 DOCREV CUR MEDS BY ELIG CLIN: HCPCS | Performed by: INTERNAL MEDICINE

## 2022-10-14 PROCEDURE — 93306 TTE W/DOPPLER COMPLETE: CPT | Performed by: INTERNAL MEDICINE

## 2022-10-14 PROCEDURE — G8536 NO DOC ELDER MAL SCRN: HCPCS | Performed by: INTERNAL MEDICINE

## 2022-10-14 PROCEDURE — 99214 OFFICE O/P EST MOD 30 MIN: CPT | Performed by: INTERNAL MEDICINE

## 2022-10-14 NOTE — PROGRESS NOTES
Gisella Chan is a 80 y.o. male    Chief Complaint   Patient presents with    Follow-up     6 month PAF, s/p TAVR    Coronary Artery Disease    Cholesterol Problem     Has some question about testing to be done by Dr Angel Cooley. Sol Codey would like to hold eliquis for a few days para operatively [phone] fax [phone]     Dr. Dario Cueva would like SHARAN or Echo ordered before neurologist 1/2023    Chest pain no    SOB slight SOB     Dizziness no    Swelling no    Refills no    Visit Vitals  /68 (BP 1 Location: Left upper arm, BP Patient Position: Sitting)   Pulse 60   Ht 5' 7\" (1.702 m)   Wt 161 lb (73 kg)   SpO2 92%   BMI 25.22 kg/m²       1. Have you been to the ER, urgent care clinic since your last visit? Hospitalized since your last visit? No    2. Have you seen or consulted any other health care providers outside of the [address] [place] [address] since your last visit? Include any pap smears or colon screening.  No

## 2022-10-14 NOTE — PROGRESS NOTES
Suki Carpio MD    [id number] [facility] [place], [address] [facility]    Office [phone],YJS [phone]            Dear Dr Sandrine Sagastume,    I had the pleasure of seeing Mr. Sheela Bee in the office today. Assessment:  Severe AS- S/p TAVR  CAD - s/p LAD stent  PAF  HLD  Hx of GERD  Sinus bradycardia  Tremors/Balance issues - followed by neurology   Pulmonary fibrosis/pulmonary hypertension    Plan:     Scheduled to undergo focused ultrasound treatment for tremors by neurosurgery-Dr. Brendan Emanuel. Dr. Brendan Emanuel would like to hold Eliquis for the procedure. Hold Eliquis 3 days prior to the procedure and restart postprocedure when safe to do so. (Telephone [phone], fax [phone]    Followed by Dr. Nga Kim for pulmonary fibrosis. She would like to get an ultrasound to evaluate pulmonary hypertension. Will check echocardiogram.    Not on B blockers sec to bradycardia. HR in 50-60's off B blockers    LDL 71 - 10/2021    On Eliquis    Aggressive cardiovascular risk factor modification. F/u 6 mths/earlier prn          Medication Side Effects and Warnings were discussed with patient: yes  Patient Labs were reviewed and or requested:  yes  Patient Past Records were reviewed and or requested: yes    I appreciate the opportunity to be involved in . Please see note below for details. Please do not hesitate to contact us with questions or concerns. Suki Carpio MD    Cardiac Testing/ Procedures: A. Cardiac Cath/PCI:12/4/19 ( Dr Joseph Hallman) Findings:  1)Severe mid LAD calcified disease  2)S/p PCI with 3 by 38 mm Xience EFFIE placement following Rotational atherectomy with 1.75 mm [place]. Post dilatation with 3.25 mm Bballoon at 20-24 KYRA. Good final result. Moderate distal disease deferred.      Access: Right radial--no issues     11/6/19 R Brachial vein access - 7 F sheath  R Radial access - 6 F sheath     Calcified coronaries  RCA - 3DRC; LCA - JL4  Codominant     L Main: Large; MLI     LAD: Prox - large; Mid/Distal Med; Ostial 30%; Mid - Ca+++; 80% ( haziness prob sec to Ca+); Another mid lesion - 70% ; Small to med D1- Prox 30%; Small  D2; Large tortuous septal     LCflex: Med to large; MLI; OM1 - Med to large; Prox 50%     RCA: Med to large; Prox/Mid - MLI; Distal at bifurcation 60-70%- small PDA/PLB     LVEDP: valve not crossed     LVEF: not assessed     No significant gradient across aortic valve. RHC findings:     RAPm= 4      mmHg  RVSP= 19    mmHg  PAPm= 14       mmHg  PCWPm=7    mmHg  CO=   6.02       l/min  CI=3.08     Specimens Removed : None     Complications: None     Closure Device: TR    B.ECHO/SHARAN:  7/28/20 · LV: Normal cavity size and systolic function (ejection fraction normal). Mildly increased wall thickness. Calculated left ventricular ejection fraction is 61%. Biplane method used to measure ejection fraction. Mild (grade 1) left ventricular diastolic dysfunction. · LA: Moderately dilated left atrium. · AV: Prosthetic aortic valve. Aortic valve mean gradient is 9.9 mmHg. There is a 26 mm KIM prosthetic aortic valve from prior TAVR procedure. Mild perivalvular aortic valve regurgitation is present. · MV: Mitral valve thickening. Mild mitral valve regurgitation is present. · TV: Mild tricuspid valve regurgitation is present. TR gradient = 41 mmHg; unable to assess IVC for RAP    1/14/20 Normal cavity size and systolic function (ejection fraction normal). Mild concentric hypertrophy. Calculated left ventricular ejection fraction is 63%. Biplane method used to measure ejection fraction. No regional wall motion abnormality noted. Mild (grade 1) left ventricular diastolic dysfunction. Prosthetic aortic valve. There is a 26 mm Watson KIM prosthetic aortic valve from prior TAVR procedure. Prosthesis is normal. Trace paravalvular leak is present. Gradients are appropriate for this valve. Mitral valve thickening.  Mild mitral annular calcification. Mild mitral valve regurgitation is present. 10/23/19 Left Ventricle: Normal cavity size and systolic function (ejection fraction normal). Mildly increased wall thickness. Estimated left ventricular ejection fraction is 56 - 60%. No regional wall motion abnormality noted. Mild (grade 1) left ventricular diastolic dysfunction. Mitral Valve: Mitral annular calcification. Mild mitral valve regurgitation is present. Left Atrium: Dilated left atrium. Tricuspid Valve: Mild tricuspid valve regurgitation is present. Aortic Valve: Severe aortic valve sclerosis with reduced excursion. Aortic valve leaflet calcification present with reduced excursion. Severe aortic valve stenosis is present. Mild to moderate aortic valve regurgitation is present. Aortic Valve Systolic Mean Gradient 56 mm Hg; AV peak gradient ( pedhoff) -74 mm Hg    C.StressNuclear/Stress ECHO/Stress test:    D.Vascular: 11/5/19 Carotid doppler 1-59% bilat ICA    E. EP: Event monitor-7-day 1/24/2020to  1/30/2020-0 critical, 0 serious and 2 stable events. Sinus bradycardia 1/27/2020-2:02 PM 38 bpm.-Auto detected. F. Miscellaneous: 12/20/19 1. Angiography of the ascending aorta and aortoiliac bifurcation  2. Temporary transvenous pacemaker insertion  3. Left heart catheterization  4. Implantation of a 26 mm Watson S3 transcatheter aortic valve via the right transfemoral approach  5. 14F right?femoral artery access with Perclose pre-closure / 6F left femoral vein access with manual compression / 6F left femoral artery access with Perclose closure    August 30 8/20/2022 CT chest-pulmonary fibrosis/severe coronary artery calcification/enlarged main pulmonary artery consistent with pulmonary hypertension  Subjective:    C/o feeling tired. He is walking in his neighborhood 3/4 mile - slowing down. No chest pain/edema/palpitations/syncope.     Tremors getting worse  New diagnosis of pulm fibrosis      ROS:  (bold if positive, if negative)           Medications before admission:    Current Outpatient Medications   Medication Sig Dispense    atorvastatin (LIPITOR) 40 mg tablet TAKE 1 TABLET DAILY 90 Tablet    Detrol LA 4 mg ER capsule TAKE 1 CAPSULE DAILY 90 Capsule    niacin ER (NIASPAN) 500 mg tablet TAKE 1 TABLET DAILY 90 Tablet    ipratropium (ATROVENT) 42 mcg (0.06 %) nasal spray 2 Sprays by Both Nostrils route three (3) times daily. 3 mL    primidone (MYSOLINE) 250 mg tablet TAKE 1 TABLET IN THE MORNING AND ONE AND ONE-HALF TABLETS AT BEDTIME 225 Tablet    apixaban (Eliquis) 5 mg tablet TAKE 1 TABLET TWICE A  Tablet    omeprazole (PRILOSEC) 20 mg capsule TAKE 1 CAPSULE DAILY 90 Capsule    ascorbic acid, vitamin C, (VITAMIN C) 1,000 mg tablet Take 1 Tab by mouth daily. multivitamin with minerals (HAIR,SKIN AND NAILS PO) Take  by mouth. acetaminophen (TYLENOL) 325 mg tablet Take 2 Tabs by mouth every four (4) hours as needed for Pain or Fever. 60 Tab    magnesium oxide (MAG-OX) 400 mg tablet Take 250 mg by mouth daily. Vit C-Vit E-Copper-ZnOx-Lutein 226-90-0.8-5 mg cap Take  by mouth. COQ10, UBIQUINOL, PO Take  by mouth. cholecalciferol (VITAMIN D3) 25 mcg (1,000 unit) cap Take 2,000 Units by mouth daily. cyanocobalamin 1,000 mcg tablet Take 5,000 mcg by mouth daily. folic acid (FOLVITE) 1 mg tablet Take  by mouth daily. No current facility-administered medications for this visit.        Family History of CAD:    Yes    Social History:  Current  Smoker No    Physical Exam:  Visit Vitals  /68 (BP 1 Location: Left upper arm, BP Patient Position: Sitting)   Pulse 60   Ht 5' 7\" (1.702 m)   Wt 161 lb (73 kg)   SpO2 92%   BMI 25.22 kg/m²        Gen:    Well-developed, well-nourished, in no acute distress  HEENT:  Pink conjunctivae, hearing intact to voice, moist mucous membranes  Neck:  Supple,No JVD, No Carotid Bruit, Thyroid- non tender  Resp:  No accessory muscle use, Coarse crackles R Base  Card: Regular Rate,Rythm,Normal S1, S2,2/6 sys murmur+,No rubs or gallop. No thrills. Abd:  Soft,  normoactive bowel sounds are present,   MSK:   No cyanosis  Skin:   No rashes or ulcers  Neuro:  moving all four extremities, no focal deficit, follows commands appropriately, Tremors UE  Psych:  Good insight, oriented to person, place and time, alert, Nml Affect  LE: No edema  Vascular: Radial Pulses 2+ and symmetric      EKG:        LABS:         ATTENTION:   This medical record was transcribed using an electronic medical records/speech recognition system. Although proofread, it may and can contain electronic, spelling and other errors. Corrections may be executed at a later time. Please feel free to contact us for any clarifications as needed.       Loreta Nunez MD

## 2022-10-17 NOTE — PROGRESS NOTES
Lyudmila Ocampo is a 80 y.o. male who presents today for Annual Wellness Visit (RM21// pt presenting today for annual [address];)  . He has a history of   Patient Active Problem List   Diagnosis Code    OAB (overactive bladder) N32.81    Hyperlipidemia E78.5    History of melanoma Z85.820    Gastroesophageal reflux disease K21.9    Murmur R01.1    Essential tremor G25.0    Aortic valve stenosis I35.0    Coronary artery disease involving native heart without angina pectoris, unspecified vessel or lesion type I25.10    CAD (coronary atherosclerotic disease) I25.10    AS (aortic stenosis) I35.0    S/P TAVR (transcatheter aortic valve replacement) Z95.2    PAF (paroxysmal atrial fibrillation) (HCC) I48.0    MCI (mild cognitive impairment) G31.84   . Today patient is here for CPE. PAFib: on [address]. Pulse does not tolerate BB. Hypertension- BP stable. Hypertension ROS: taking medications as instructed, no medication side effects noted, no TIA's, no chest pain on exertion, no dyspnea on exertion, no swelling of ankles     reports that he has never smoked. He has never used smokeless tobacco.    reports that he does not currently use alcohol. BP Readings from Last 2 Encounters:   10/18/22 128/80   10/14/22 122/68     Essential Tremor: Met with neurosurgeon and plans on proceeding with focused ultrasound treatment for tremors with . We will be holding oral anticoagulation for that. Has had significant side effects with medications. Pulmonary Fibrosis: seeing Pulm. Exercise tolerance is stable. Still walking about 30 mins/day without stopping. S/p AVR: seeing cards. ECHO this month stable. Hyperlipidemia  On Atorvastatin. ROS: taking medications as instructed, no medication side effects noted  No new myalgias, no joint pains, no weakness  No TIA's, no chest pain on exertion, no dyspnea on exertion, no swelling of ankles.    Lab Results   Component Value Date/Time    Cholesterol, total 143 10/14/2021 02:14 PM    HDL Cholesterol 60 10/14/2021 02:14 PM    LDL, calculated 71.4 10/14/2021 02:14 PM    VLDL, calculated 11.6 10/14/2021 02:14 PM    Triglyceride 58 10/14/2021 02:14 PM    CHOL/HDL Ratio 2.4 10/14/2021 02:14 PM       Health maintenance hx includes:  Exercise: moderately active. Form of exercise: walking regularly. Social: Patient recently moved here from [place]. He lives at home with his wife. From [facility]. Denies any smoking history. He is independent of all activities of daily living. He is retired [employer] through the [employer]. Two sons. 2 grandchildren, 1 great-grandchild  Social alcohol. No significant smoking history. Cancer screening:               Colon cancer screening:  N/A              Prostate cancer screening: PSA and/or MARY: N/A    Immunizations:     Immunization History   Administered Date(s) Administered     Influenza, FLUZONE (age 72 y+), High Dose 09/08/2020    COVID-19, PFIZER Bivalent BOOSTER, (age 12y+), IM, 30 mcg/0.3 mL 09/24/2022    COVID-19, PFIZER PURPLE top, DILUTE for use, (age 15 y+), IM, 30mcg/0.3mL 01/25/2021, 02/15/2021, 11/13/2021, 04/13/2022    Influenza High Dose Vaccine PF 10/04/2011, 11/02/2012, 10/15/2015, 10/19/2016, 11/15/2017, 09/25/2018    Influenza Vaccine 09/30/2010, 10/04/2011, 11/02/2012, 10/10/2013, 10/03/2014, 09/10/2020    Influenza Vaccine (Tri) Adjuvanted (>65 Yrs FLUAD TRI [id number]) 10/16/2019    Influenza, FLUAD, (age 72 y+), Adjuvanted 10/14/2021, 10/18/2022    Pneumococcal Conjugate (PCV-13) 05/26/2010, 04/06/2015    Pneumococcal Polysaccharide (PPSV-23) 01/01/2003, 02/01/2010, 04/06/2015    Tdap 06/23/2020    Zoster Recombinant 06/26/2020, 09/08/2020    Zoster Vaccine, Live 04/27/2009, 01/28/2014, 05/10/2017      Immunization status: up to date and documented. ROS  Review of Systems   Constitutional:  Negative for chills, fever and weight loss. HENT:  Negative for congestion and sore throat.     Eyes: Negative for blurred vision, double vision and photophobia. Respiratory:  Positive for shortness of breath (Stable). Negative for cough. Cardiovascular:  Negative for chest pain, palpitations and leg swelling. Gastrointestinal:  Negative for abdominal pain, constipation, diarrhea, heartburn, nausea and vomiting. Genitourinary:  Negative for dysuria, frequency and urgency. Musculoskeletal:  Negative for myalgias and neck pain. Skin:  Negative for rash. Neurological:  Positive for tremors. Negative for headaches. Endo/Heme/Allergies:  Does not bruise/bleed easily. Psychiatric/Behavioral:  Negative for memory loss and suicidal ideas. Visit Vitals  /80 (BP 1 Location: Left upper arm, BP Patient Position: Sitting, BP Cuff Size: Adult)   Pulse (!) 55   Temp 97.6 °F (36.4 °C) (Oral)   Resp 16   Ht 5' 7\" (1.702 m)   Wt 162 lb 3.2 oz (73.6 kg)   SpO2 97%   BMI 25.40 kg/m²       Physical Exam  Constitutional:       Appearance: He is well-developed. He is not diaphoretic. HENT:      Head: Normocephalic and atraumatic. Eyes:      Pupils: Pupils are equal, round, and reactive to light. Neck:      Vascular: No JVD. Cardiovascular:      Rate and Rhythm: Normal rate and regular rhythm. Heart sounds: Murmur heard. Pulmonary:      Effort: Pulmonary effort is normal. No respiratory distress. Breath sounds: Normal breath sounds. No wheezing. Abdominal:      General: Bowel sounds are normal. There is no distension. Palpations: Abdomen is soft. Tenderness: There is no abdominal tenderness. Musculoskeletal:         General: Normal range of motion. Cervical back: Normal range of motion and neck supple. Comments: Chronic fungal infection to toenails. Skin:     General: Skin is warm and dry. Neurological:      Mental Status: He is alert and oriented to person, place, and time. Cranial Nerves: No cranial nerve deficit.       Comments: Essential tremor Psychiatric:         Behavior: Behavior normal.         Current Outpatient Medications   Medication Sig    atorvastatin (LIPITOR) 40 mg tablet TAKE 1 TABLET DAILY    Detrol LA 4 mg ER capsule TAKE 1 CAPSULE DAILY    niacin ER (NIASPAN) 500 mg tablet TAKE 1 TABLET DAILY    ipratropium (ATROVENT) 42 mcg (0.06 %) nasal spray 2 Sprays by Both Nostrils route three (3) times daily. primidone (MYSOLINE) 250 mg tablet TAKE 1 TABLET IN THE MORNING AND ONE AND ONE-HALF TABLETS AT BEDTIME    apixaban (Eliquis) 5 mg tablet TAKE 1 TABLET TWICE A DAY    omeprazole (PRILOSEC) 20 mg capsule TAKE 1 CAPSULE DAILY    ascorbic acid, vitamin C, (VITAMIN C) 1,000 mg tablet Take 1 Tab by mouth daily. multivitamin with minerals (HAIR,SKIN AND NAILS PO) Take  by mouth. acetaminophen (TYLENOL) 325 mg tablet Take 2 Tabs by mouth every four (4) hours as needed for Pain or Fever. magnesium oxide (MAG-OX) 400 mg tablet Take 250 mg by mouth daily. Vit C-Vit E-Copper-ZnOx-Lutein 226-90-0.8-5 mg cap Take  by mouth. COQ10, UBIQUINOL, PO Take  by mouth. cholecalciferol (VITAMIN D3) 25 mcg (1,000 unit) cap Take 2,000 Units by mouth daily. cyanocobalamin 1,000 mcg tablet Take 5,000 mcg by mouth daily. folic acid (FOLVITE) 1 mg tablet Take  by mouth daily. No current facility-administered medications for this visit.         Past Medical History:   Diagnosis Date    CAD (coronary artery disease) 12/19    stint    GERD (gastroesophageal reflux disease)     Hyperactivity of bladder     Hypercholesterolemia     Macular degeneration disease     Skin cancer (melanoma) (HCC)     Tremor       Past Surgical History:   Procedure Laterality Date    HX HERNIA REPAIR      HX MOHS PROCEDURES      MI CARDIAC SURG PROCEDURE UNLIST  12/2019    TAVR      Social History     Tobacco Use    Smoking status: Never    Smokeless tobacco: Never   Substance Use Topics    Alcohol use: Not Currently     Comment: beer or glass of wine nightly Family History   Problem Relation Age of Onset    Cancer Father             Lung Cancer Father         smoker    Heart Disease Father     Heart Disease Brother     Diabetes Brother     Cancer Brother             Gall Bladder Disease Brother     Diabetes Maternal Grandfather     Prostate Cancer Brother     Migraines Mother                 Allergies   Allergen Reactions    Penicillins Hives        Assessment/Plan  Diagnoses and all orders for this visit:    1. Medicare annual wellness visit, subsequent- Parish Mullen was counseled on age-appropriate/ guideline-based risk prevention behaviors and screening for a 80y.o. year old   male . We also discussed adjustments in screening based on family history if necessary. Printed instructions for preventative screening guidelines and healthy behaviors given to patient with after visit summary. 2. Atherosclerosis of native coronary artery of native heart without angina pectoris-denies any anginal symptoms. 3. PAF (paroxysmal atrial fibrillation) (HCC)-remains on oral anticoagulation. 4. Essential tremor-planning on proceeding with ultrasound focused treatment. Seeing neurosurgeon    5. OAB (overactive bladder)-urinary symptoms stable    6. Mixed hyperlipidemia-repeat today  -     METABOLIC PANEL, COMPREHENSIVE; Future  -     LIPID PANEL; Future    7. S/P TAVR (transcatheter aortic valve replacement)-exercise tolerance is stable    8. Needs flu shot  -     INFLUENZA, FLUAD, (AGE 65 Y+), IM, PF, 0.5 ML    9. Advanced directives, counseling/discussion    10. Gastroesophageal reflux disease, unspecified whether esophagitis present    11. Pulmonary fibrosis-seeing pulmonary Associates          Aditya Dumont MD  10/18/2022    This note was created with the help of speech recognition software Amber Lyon) and may contain some 'sound alike' errors.    This is the Subsequent Medicare Annual Wellness Exam, performed 12 months or more after the Initial AWV or the last Subsequent AWV    I have reviewed the patient's medical history in detail and updated the computerized patient record. Assessment/Plan   Education and counseling provided:  Are appropriate based on today's review and evaluation  End-of-Life planning (with patient's consent)    1. Medicare annual wellness visit, subsequent  2. Atherosclerosis of native coronary artery of native heart without angina pectoris  3. PAF (paroxysmal atrial fibrillation) ([facility] [address]  4. Essential tremor  5. OAB (overactive bladder)  6. Mixed hyperlipidemia  -     METABOLIC PANEL, COMPREHENSIVE; Future  -     LIPID PANEL; Future  7. S/P TAVR (transcatheter aortic valve replacement)  8. Needs flu shot  -     INFLUENZA, FLUAD, (AGE 65 Y+), IM, PF, 0.5 ML  9. Advanced directives, counseling/discussion  10.  Gastroesophageal reflux disease, unspecified whether esophagitis present       Depression Risk Factor Screening     3 most recent PHQ Screens 10/18/2022   Little interest or pleasure in doing things Not at all   Feeling down, depressed, irritable, or hopeless Not at all   Total Score PHQ 2 0   Trouble falling or staying asleep, or sleeping too much Not at all   Feeling tired or having little energy Not at all   Poor appetite, weight loss, or overeating Not at all   Feeling bad about yourself - or that you are a failure or have let yourself or your family down Not at all   Trouble concentrating on things such as school, work, reading, or watching TV Not at all   Moving or speaking so slowly that other people could have noticed; or the opposite being so fidgety that others notice Not at all   Thoughts of being better off dead, or hurting yourself in some way Not at all   PHQ 9 Score 0   How difficult have these problems made it for you to do your work, take care of your home and get along with others Not difficult at all       Alcohol & Drug Abuse Risk Screen   Do you average more than 1 drink per night or more than 7 drinks a week?: (P) No  In the past three months have you had more than 4 drinks containing alcohol on one occasion?: (P) No          Functional Ability and Level of Safety   Hearing:  Hearing: (P) Patient reports hearing is good    Activities of Daily Living: The home contains: (P) grab bars, rugs  Functional ADLs: (P) Patient does total self care   Ambulation:  Patient ambulates: (P) with mild difficulty  How far the patient can walk with difficulty: (P) over a mile  Walking is difficult due to: (P) additional comments below  Additional comments about walking difficulties: (P) short steps slight instability  Walking aids: (P) additional comments below  Additional comments : (P) none or hiking stick   Fall Risk:  Fall Risk Assessment, last 12 mths 10/18/2022   Able to walk? Yes   Fall in past 12 months? 0   Do you feel unsteady?  0   Are you worried about falling 0   Is the gait abnormal? -   Number of falls in past 12 months -     Abuse Screen:  Do you ever feel afraid of your partner?: (P) No  Are you in a relationship with someone who physically or mentally threatens you?: (P) No  Is it safe for you to go home?: (P) Yes      Cognitive Screening   Has your family/caregiver stated any concerns about your memory?: (P) No     Health Maintenance Due     Health Maintenance Due   Topic Date Due    Lipid Screen  10/14/2022       Patient Care Team   Patient Care Team:  Adilene Curry MD as PCP - General (Internal Medicine Physician)  Adilene Curry MD as PCP - [facility] Provider  Mariam Herring MD as Physician (Ophthalmology)  Mary Herring MD (Cardiovascular Disease Physician)  Chuy Wolf MD (Neurosurgery)    History     Patient Active Problem List   Diagnosis Code    OAB (overactive bladder) N32.81    Hyperlipidemia E78.5    History of melanoma Z85.820    Gastroesophageal reflux disease K21.9    Murmur R01.1    Essential tremor G25.0    Aortic valve stenosis I35.0    Coronary artery disease involving native heart without angina pectoris, unspecified vessel or lesion type I25.10    CAD (coronary atherosclerotic disease) I25.10    AS (aortic stenosis) I35.0    S/P TAVR (transcatheter aortic valve replacement) Z95.2    PAF (paroxysmal atrial fibrillation) ([facility]) I48.0    MCI (mild cognitive impairment) G31.84     Past Medical History:   Diagnosis Date    CAD (coronary artery disease) 12/19    stint    GERD (gastroesophageal reflux disease)     Hyperactivity of bladder     Hypercholesterolemia     Macular degeneration disease     Skin cancer (melanoma) ([facility])     Tremor       Past Surgical History:   Procedure Laterality Date    HX HERNIA REPAIR      HX MOHS PROCEDURES      SC CARDIAC SURG PROCEDURE UNLIST  12/2019    TAVR     Current Outpatient Medications   Medication Sig Dispense Refill    atorvastatin (LIPITOR) 40 mg tablet TAKE 1 TABLET DAILY 90 Tablet 3    Detrol LA 4 mg ER capsule TAKE 1 CAPSULE DAILY 90 Capsule 3    niacin ER (NIASPAN) 500 mg tablet TAKE 1 TABLET DAILY 90 Tablet 3    ipratropium (ATROVENT) 42 mcg (0.06 %) nasal spray 2 Sprays by Both Nostrils route three (3) times daily. 3 mL 3    primidone (MYSOLINE) 250 mg tablet TAKE 1 TABLET IN THE MORNING AND ONE AND ONE-HALF TABLETS AT BEDTIME 225 Tablet 3    apixaban (Eliquis) 5 mg tablet TAKE 1 TABLET TWICE A  Tablet 3    omeprazole (PRILOSEC) 20 mg capsule TAKE 1 CAPSULE DAILY 90 Capsule 3    ascorbic acid, vitamin C, (VITAMIN C) 1,000 mg tablet Take 1 Tab by mouth daily. multivitamin with minerals (HAIR,SKIN AND NAILS PO) Take  by mouth. acetaminophen (TYLENOL) 325 mg tablet Take 2 Tabs by mouth every four (4) hours as needed for Pain or Fever. 60 Tab 2    magnesium oxide (MAG-OX) 400 mg tablet Take 250 mg by mouth daily. Vit C-Vit E-Copper-ZnOx-Lutein 226-90-0.8-5 mg cap Take  by mouth. COQ10, UBIQUINOL, PO Take  by mouth. cholecalciferol (VITAMIN D3) 25 mcg (1,000 unit) cap Take 2,000 Units by mouth daily. cyanocobalamin 1,000 mcg tablet Take 5,000 mcg by mouth daily. folic acid (FOLVITE) 1 mg tablet Take  by mouth daily.        Allergies   Allergen Reactions    Penicillins Hives       Family History   Problem Relation Age of Onset    Cancer Father             Lung Cancer Father         smoker    Heart Disease Father     Heart Disease Brother     Diabetes Brother     Cancer Brother             Gall Bladder Disease Brother     Diabetes Maternal Grandfather     Prostate Cancer Brother     Migraines Mother              Social History     Tobacco Use    Smoking status: Never    Smokeless tobacco: Never   Substance Use Topics    Alcohol use: Not Currently     Comment: beer or glass of wine nightly          Jacquie Adair MD

## 2022-10-18 ENCOUNTER — OFFICE VISIT (OUTPATIENT)
Dept: INTERNAL MEDICINE CLINIC | Age: 87
End: 2022-10-18
Payer: MEDICARE

## 2022-10-18 ENCOUNTER — TELEPHONE (OUTPATIENT)
Dept: INTERNAL MEDICINE CLINIC | Age: 87
End: 2022-10-18

## 2022-10-18 VITALS
OXYGEN SATURATION: 97 % | BODY MASS INDEX: 25.46 KG/M2 | DIASTOLIC BLOOD PRESSURE: 80 MMHG | WEIGHT: 162.2 LBS | RESPIRATION RATE: 16 BRPM | HEIGHT: 67 IN | SYSTOLIC BLOOD PRESSURE: 128 MMHG | TEMPERATURE: 97.6 F | HEART RATE: 55 BPM

## 2022-10-18 DIAGNOSIS — G25.0 ESSENTIAL TREMOR: ICD-10-CM

## 2022-10-18 DIAGNOSIS — Z23 NEEDS FLU SHOT: ICD-10-CM

## 2022-10-18 DIAGNOSIS — E78.2 MIXED HYPERLIPIDEMIA: ICD-10-CM

## 2022-10-18 DIAGNOSIS — N32.81 OAB (OVERACTIVE BLADDER): ICD-10-CM

## 2022-10-18 DIAGNOSIS — Z00.00 MEDICARE ANNUAL WELLNESS VISIT, SUBSEQUENT: Primary | ICD-10-CM

## 2022-10-18 DIAGNOSIS — Z71.89 ADVANCED DIRECTIVES, COUNSELING/DISCUSSION: ICD-10-CM

## 2022-10-18 DIAGNOSIS — I48.0 PAF (PAROXYSMAL ATRIAL FIBRILLATION) (HCC): ICD-10-CM

## 2022-10-18 DIAGNOSIS — Z95.2 S/P TAVR (TRANSCATHETER AORTIC VALVE REPLACEMENT): ICD-10-CM

## 2022-10-18 DIAGNOSIS — I25.10 ATHEROSCLEROSIS OF NATIVE CORONARY ARTERY OF NATIVE HEART WITHOUT ANGINA PECTORIS: ICD-10-CM

## 2022-10-18 DIAGNOSIS — J84.10 PULMONARY FIBROSIS (HCC): ICD-10-CM

## 2022-10-18 DIAGNOSIS — K21.9 GASTROESOPHAGEAL REFLUX DISEASE, UNSPECIFIED WHETHER ESOPHAGITIS PRESENT: ICD-10-CM

## 2022-10-18 PROCEDURE — G0463 HOSPITAL OUTPT CLINIC VISIT: HCPCS | Performed by: INTERNAL MEDICINE

## 2022-10-18 PROCEDURE — G8427 DOCREV CUR MEDS BY ELIG CLIN: HCPCS | Performed by: INTERNAL MEDICINE

## 2022-10-18 PROCEDURE — 1101F PT FALLS ASSESS-DOCD LE1/YR: CPT | Performed by: INTERNAL MEDICINE

## 2022-10-18 PROCEDURE — G8417 CALC BMI ABV UP PARAM F/U: HCPCS | Performed by: INTERNAL MEDICINE

## 2022-10-18 PROCEDURE — G8510 SCR DEP NEG, NO PLAN REQD: HCPCS | Performed by: INTERNAL MEDICINE

## 2022-10-18 PROCEDURE — G8536 NO DOC ELDER MAL SCRN: HCPCS | Performed by: INTERNAL MEDICINE

## 2022-10-18 PROCEDURE — 90694 VACC AIIV4 NO PRSRV 0.5ML IM: CPT | Performed by: INTERNAL MEDICINE

## 2022-10-18 PROCEDURE — 99213 OFFICE O/P EST LOW 20 MIN: CPT | Performed by: INTERNAL MEDICINE

## 2022-10-18 PROCEDURE — G0439 PPPS, SUBSEQ VISIT: HCPCS | Performed by: INTERNAL MEDICINE

## 2022-10-18 NOTE — ACP (ADVANCE CARE PLANNING)
Advance Care Planning     General Advance Care Planning (ACP) Conversation      Date of Conversation: 10/18/2022  Conducted with: Patient with Decision Making Capacity    Healthcare Decision Maker:     Primary Decision Maker: Edwin Hwang - Spouse - [phone]  Click here to complete [address] including selection of the Healthcare Decision Maker Relationship (ie \"Primary\")    Today we documented Decision Maker(s) consistent with Legal Next of Kin hierarchy. Content/Action Overview:    Has ACP document(s) on file - reflects the patient's care preferences      Length of Voluntary ACP Conversation in minutes:  <16 minutes (Non-Billable)    Jacqulyne Kanner, MD

## 2022-10-18 NOTE — TELEPHONE ENCOUNTER
Patient said Dr. Bo Valle was going to give a referral today for him to see a toe specialist, but they never got the referral paperwork and was not sure if the doctor forgot to give it to them. Once referral is ready, please call the patient to let them know this is complete.

## 2022-10-18 NOTE — PROGRESS NOTES
[employer]  Identified pt with two pt identifiers(name and ). Chief Complaint   Patient presents with    Annual Wellness Visit     RM21// pt presenting today for annual [address];       1. Have you been to the ER, urgent care clinic since your last visit? Hospitalized since your last visit? NO    2. Have you seen or consulted any other health care providers outside of the [address] [place] [address] since your last visit? Include any pap smears or colon screening. NO      Provider notified of reason for visit, vitals and flowsheets obtained on patients. Patient received paperwork for advance directive during previous visit but has not completed at this time     Reviewed record In preparation for visit, huddled with provider and have obtained necessary documentation      Health Maintenance Due   Topic    Flu Vaccine (1)    Lipid Screen     Medicare Yearly Exam        Wt Readings from Last 3 Encounters:   10/18/22 162 lb 3.2 oz (73.6 kg)   10/14/22 161 lb (73 kg)   10/14/22 161 lb (73 kg)     Temp Readings from Last 3 Encounters:   10/18/22 97.6 °F (36.4 °C) (Oral)   22 97.4 °F (36.3 °C) (Oral)   22 97.5 °F (36.4 °C) (Oral)     BP Readings from Last 3 Encounters:   10/18/22 128/80   10/14/22 122/68   10/14/22 122/68     Pulse Readings from Last 3 Encounters:   10/18/22 (!) 55   10/14/22 60   22 61     Vitals:    10/18/22 1047   BP: 128/80   Pulse: (!) 55   Resp: 16   Temp: 97.6 °F (36.4 °C)   TempSrc: Oral   SpO2: 97%   Weight: 162 lb 3.2 oz (73.6 kg)   Height: 5' 7\" (1.702 m)   PainSc:   0 - No pain         Learning Assessment:  :     No flowsheet data found.     Depression Screening:  :     3 most recent PHQ Screens 10/18/2022   Little interest or pleasure in doing things Not at all   Feeling down, depressed, irritable, or hopeless Not at all   Total Score PHQ 2 0   Trouble falling or staying asleep, or sleeping too much Not at all   Feeling tired or having little energy Not at all   Poor appetite, weight loss, or overeating Not at all   Feeling bad about yourself - or that you are a failure or have let yourself or your family down Not at all   Trouble concentrating on things such as school, work, reading, or watching TV Not at all   Moving or speaking so slowly that other people could have noticed; or the opposite being so fidgety that others notice Not at all   Thoughts of being better off dead, or hurting yourself in some way Not at all   PHQ 9 Score 0   How difficult have these problems made it for you to do your work, take care of your home and get along with others Not difficult at all       Fall Risk Assessment:  :     Fall Risk Assessment, last 12 mths 10/18/2022   Able to walk? Yes   Fall in past 12 months? 0   Do you feel unsteady? 0   Are you worried about falling 0   Is the gait abnormal? -   Number of falls in past 12 months -       Abuse Screening:  :     Abuse Screening Questionnaire 10/15/2022 12/15/2020 6/23/2020 1/21/2020 12/11/2019 11/20/2019 10/16/2019   Do you ever feel afraid of your partner? N N N N N N N   Are you in a relationship with someone who physically or mentally threatens you? N N N N N N N   Is it safe for you to go home? Y Y Y Y Y Y Y       ADL Screening:  :     No flowsheet data found. Medication reconciliation up to date and corrected with patient at this time.

## 2022-10-18 NOTE — PATIENT INSTRUCTIONS
Medicare Wellness Visit, Male    The best way to live healthy is to have a lifestyle where you eat a well-balanced diet, exercise regularly, limit alcohol use, and quit all forms of tobacco/nicotine, if applicable. Regular preventive services are another way to keep healthy. Preventive services (vaccines, screening tests, monitoring & exams) can help personalize your care plan, which helps you manage your own care. Screening tests can find health problems at the earliest stages, when they are easiest to treat. [facility] follows the current, evidence-based guidelines published by the [facility] Jaun Alen ([facility]) when recommending preventive services for our patients. Because we follow these guidelines, sometimes recommendations change over time as research supports it. (For example, a prostate screening blood test is no longer routinely recommended for men with no symptoms). Of course, you and your doctor may decide to screen more often for some diseases, based on your risk and co-morbidities (chronic disease you are already diagnosed with). Preventive services for you include:  - Medicare offers their members a free annual wellness visit, which is time for you and your primary care provider to discuss and plan for your preventive service needs. Take advantage of this benefit every year!  -All adults over age 72 should receive the recommended pneumonia vaccines. Current USPSTF guidelines recommend a series of two vaccines for the best pneumonia protection.   -All adults should have a flu vaccine yearly and tetanus vaccine every 10 years.  -All adults age 48 and older should receive the shingles vaccines (series of two vaccines).        -All adults age 38-68 who are overweight should have a diabetes screening test once every three years.   -Other screening tests & preventive services for persons with diabetes include: an eye exam to screen for diabetic retinopathy, a kidney function test, a foot exam, and stricter control over your cholesterol.   -Cardiovascular screening for adults with routine risk involves an electrocardiogram (ECG) at intervals determined by the provider.   -Colorectal cancer screening should be done for adults age 54-65 with no increased risk factors for colorectal cancer. There are a number of acceptable methods of screening for this type of cancer. Each test has its own benefits and drawbacks. Discuss with your provider what is most appropriate for you during your annual wellness visit. The different tests include: colonoscopy (considered the best screening method), a fecal occult blood test, a fecal DNA test, and sigmoidoscopy.  -All adults born between [facility] should be screened once for Hepatitis C.  -An Abdominal Aortic Aneurysm (AAA) Screening is recommended for men age 73-68 who has ever smoked in their lifetime.      Here is a list of your current Health Maintenance items (your personalized list of preventive services) with a due date:  Health Maintenance Due   Topic Date Due    Yearly Flu Vaccine (1) 08/01/2022    Cholesterol Test   10/14/2022

## 2022-10-19 LAB
ALBUMIN SERPL-MCNC: 4.1 G/DL (ref 3.5–5)
ALBUMIN/GLOB SERPL: 1.3 {RATIO} (ref 1.1–2.2)
ALP SERPL-CCNC: 124 U/L (ref 45–117)
ALT SERPL-CCNC: 34 U/L (ref 12–78)
ANION GAP SERPL CALC-SCNC: 1 MMOL/L (ref 5–15)
AST SERPL-CCNC: 26 U/L (ref 15–37)
BILIRUB SERPL-MCNC: 0.3 MG/DL (ref 0.2–1)
BUN SERPL-MCNC: 18 MG/DL (ref 6–20)
BUN/CREAT SERPL: 21 (ref 12–20)
CALCIUM SERPL-MCNC: 9 MG/DL (ref 8.5–10.1)
CHLORIDE SERPL-SCNC: 104 MMOL/L (ref 97–108)
CHOLEST SERPL-MCNC: 141 MG/DL
CO2 SERPL-SCNC: 33 MMOL/L (ref 21–32)
CREAT SERPL-MCNC: 0.84 MG/DL (ref 0.7–1.3)
GLOBULIN SER CALC-MCNC: 3.2 G/DL (ref 2–4)
GLUCOSE SERPL-MCNC: 79 MG/DL (ref 65–100)
HDLC SERPL-MCNC: 62 MG/DL
HDLC SERPL: 2.3 {RATIO} (ref 0–5)
LDLC SERPL CALC-MCNC: 68.6 MG/DL (ref 0–100)
POTASSIUM SERPL-SCNC: 4 MMOL/L (ref 3.5–5.1)
PROT SERPL-MCNC: 7.3 G/DL (ref 6.4–8.2)
SODIUM SERPL-SCNC: 138 MMOL/L (ref 136–145)
TRIGL SERPL-MCNC: 52 MG/DL (ref ?–150)
VLDLC SERPL CALC-MCNC: 10.4 MG/DL

## 2022-10-26 LAB
ECHO AO ASC DIAM: 3.1 CM
ECHO AO ASCENDING AORTA INDEX: 1.68 CM/M2
ECHO AV AREA PEAK VELOCITY: 1.6 CM2
ECHO AV AREA VTI: 1.7 CM2
ECHO AV AREA/BSA PEAK VELOCITY: 0.9 CM2/M2
ECHO AV AREA/BSA VTI: 0.9 CM2/M2
ECHO AV MEAN GRADIENT: 13 MMHG
ECHO AV MEAN VELOCITY: 1.7 M/S
ECHO AV PEAK GRADIENT: 23 MMHG
ECHO AV PEAK VELOCITY: 2.4 M/S
ECHO AV VELOCITY RATIO: 0.5
ECHO AV VTI: 48.5 CM
ECHO EST RA PRESSURE: 3 MMHG
ECHO LA VOL 2C: 72 ML (ref 18–58)
ECHO LA VOL 4C: 57 ML (ref 18–58)
ECHO LA VOL BP: 65 ML (ref 18–58)
ECHO LA VOL/BSA BIPLANE: 35 ML/M2 (ref 16–34)
ECHO LA VOLUME AREA LENGTH: 69 ML
ECHO LA VOLUME INDEX A2C: 39 ML/M2 (ref 16–34)
ECHO LA VOLUME INDEX A4C: 31 ML/M2 (ref 16–34)
ECHO LA VOLUME INDEX AREA LENGTH: 38 ML/M2 (ref 16–34)
ECHO LV E' LATERAL VELOCITY: 6 CM/S
ECHO LV E' SEPTAL VELOCITY: 5 CM/S
ECHO LV FRACTIONAL SHORTENING: 36 % (ref 28–44)
ECHO LV INTERNAL DIMENSION DIASTOLE INDEX: 2.72 CM/M2
ECHO LV INTERNAL DIMENSION DIASTOLIC: 5 CM (ref 4.2–5.9)
ECHO LV INTERNAL DIMENSION SYSTOLIC INDEX: 1.74 CM/M2
ECHO LV INTERNAL DIMENSION SYSTOLIC: 3.2 CM
ECHO LV IVSD: 1.1 CM (ref 0.6–1)
ECHO LV MASS 2D: 194.4 G (ref 88–224)
ECHO LV MASS INDEX 2D: 105.6 G/M2 (ref 49–115)
ECHO LV POSTERIOR WALL DIASTOLIC: 1 CM (ref 0.6–1)
ECHO LV RELATIVE WALL THICKNESS RATIO: 0.4
ECHO LVOT AREA: 3.1 CM2
ECHO LVOT AV VTI INDEX: 0.54
ECHO LVOT DIAM: 2 CM
ECHO LVOT MEAN GRADIENT: 4 MMHG
ECHO LVOT PEAK GRADIENT: 6 MMHG
ECHO LVOT PEAK VELOCITY: 1.2 M/S
ECHO LVOT STROKE VOLUME INDEX: 44.9 ML/M2
ECHO LVOT SV: 82.6 ML
ECHO LVOT VTI: 26.3 CM
ECHO MV A VELOCITY: 1.18 M/S
ECHO MV AREA PHT: 2.4 CM2
ECHO MV E DECELERATION TIME (DT): 321.9 MS
ECHO MV E VELOCITY: 0.58 M/S
ECHO MV E/A RATIO: 0.49
ECHO MV E/E' LATERAL: 9.67
ECHO MV E/E' RATIO (AVERAGED): 10.63
ECHO MV E/E' SEPTAL: 11.6
ECHO MV PRESSURE HALF TIME (PHT): 93.4 MS
ECHO RIGHT VENTRICULAR SYSTOLIC PRESSURE (RVSP): 43 MMHG
ECHO RV FREE WALL PEAK S': 15 CM/S
ECHO RV INTERNAL DIMENSION: 3.5 CM
ECHO RV TAPSE: 1.6 CM (ref 1.7–?)
ECHO TV REGURGITANT MAX VELOCITY: 3.16 M/S
ECHO TV REGURGITANT PEAK GRADIENT: 40 MMHG

## 2022-10-26 PROCEDURE — 93306 TTE W/DOPPLER COMPLETE: CPT | Performed by: INTERNAL MEDICINE

## 2022-10-31 NOTE — LETTER
10/14/2022    Patient: Avani Suarez   YOB: 1934   Date of Visit: 10/14/2022     Lanette Fernandez MD  [address] [place]  [address]  [address] [place] [address]  Via In [facility] [address]    Dear Lanette Fernandez MD,      Thank you for referring Mr. Susan Bunn to [facility] for evaluation. My notes for this consultation are attached. If you have questions, please do not hesitate to call me. I look forward to following your patient along with you.       Sincerely,    Arpita Ivan MD Consent: Written consent obtained, risks reviewed including but not limited to crusting, scabbing, blistering, scarring, darker or lighter pigmentary change, incidental hair removal, bruising, and/or incomplete removal.

## 2022-12-12 ENCOUNTER — TELEPHONE (OUTPATIENT)
Dept: NEUROLOGY | Age: 87
End: 2022-12-12

## 2022-12-12 NOTE — TELEPHONE ENCOUNTER
Patient finished the focused ultrasound with Dr. Víctor Sandoval. He said it reduced his tremors in his right hand and he has slight tremors in his left hand. He wants to know if he can reduce Primidone.

## 2023-01-05 ENCOUNTER — TELEPHONE (OUTPATIENT)
Dept: INTERNAL MEDICINE CLINIC | Age: 88
End: 2023-01-05

## 2023-01-05 NOTE — TELEPHONE ENCOUNTER
Patient called to state he needs a referral to see a dermatologist for something on his nose that wasn't there before. He would like a call back from the nurse with some recommendations of a dermatologist he could see. Please call back and advise.

## 2023-01-18 ENCOUNTER — OFFICE VISIT (OUTPATIENT)
Dept: NEUROLOGY | Age: 88
End: 2023-01-18
Payer: MEDICARE

## 2023-01-18 VITALS — DIASTOLIC BLOOD PRESSURE: 78 MMHG | RESPIRATION RATE: 20 BRPM | SYSTOLIC BLOOD PRESSURE: 126 MMHG

## 2023-01-18 DIAGNOSIS — G25.0 ESSENTIAL TREMOR: Primary | ICD-10-CM

## 2023-01-18 PROCEDURE — G8432 DEP SCR NOT DOC, RNG: HCPCS

## 2023-01-18 PROCEDURE — 1101F PT FALLS ASSESS-DOCD LE1/YR: CPT

## 2023-01-18 PROCEDURE — G8536 NO DOC ELDER MAL SCRN: HCPCS

## 2023-01-18 PROCEDURE — G8427 DOCREV CUR MEDS BY ELIG CLIN: HCPCS

## 2023-01-18 PROCEDURE — 99214 OFFICE O/P EST MOD 30 MIN: CPT

## 2023-01-18 PROCEDURE — 1123F ACP DISCUSS/DSCN MKR DOCD: CPT

## 2023-01-18 PROCEDURE — G8417 CALC BMI ABV UP PARAM F/U: HCPCS

## 2023-01-18 NOTE — PROGRESS NOTES
Has been taking the one pill at night of the primidone since the middle of December   He says it is doing okay with that dosage

## 2023-01-18 NOTE — PROGRESS NOTES
Jeanine Oglesby is a 80 y.o. male who presents with the following  Chief Complaint   Patient presents with    Follow-up     Wants to decrease his medication        HPI  Mr. Angelica Pacheco is here today for essential tremor follow-up. His last visit was April 14, 2022 with Dr. Brant Chino. Patient was on Mysoline for his essential tremor but still continued to be bothered by it. Dr. Brant Chino sent the patient to Dr. Judit Archibald for focused ultrasound at [facility]. This was done in December. Today the patient states that his right hand tremor is 100% gone and he only has a very slight tremor remaining in his left hand. He does have a slight head tremor but this does not bother the patient. He states that he has no trouble eating or writing any longer. He is very happy with the outcome. Patient stated that he reduced his dose of Mysoline about a month ago after the surgery was completed he is currently taking 250 mg at night but would like to stop this completely. Discussed focused ultrasound with Dr. Brant Chino and he said as long as the tremor was better, he could wean off the Mysoline. Allergies   Allergen Reactions    Penicillins Hives       Current Outpatient Medications   Medication Sig    apixaban (Eliquis) 5 mg tablet TAKE 1 TABLET TWICE A DAY    atorvastatin (LIPITOR) 40 mg tablet TAKE 1 TABLET DAILY    Detrol LA 4 mg ER capsule TAKE 1 CAPSULE DAILY    niacin ER (NIASPAN) 500 mg tablet TAKE 1 TABLET DAILY    ipratropium (ATROVENT) 42 mcg (0.06 %) nasal spray 2 Sprays by Both Nostrils route three (3) times daily. primidone (MYSOLINE) 250 mg tablet TAKE 1 TABLET IN THE MORNING AND ONE AND ONE-HALF TABLETS AT BEDTIME (Patient taking differently: Take 250 mg by mouth nightly.)    omeprazole (PRILOSEC) 20 mg capsule TAKE 1 CAPSULE DAILY    ascorbic acid, vitamin C, (VITAMIN C) 1,000 mg tablet Take 1 Tab by mouth daily. multivitamin with minerals (HAIR,SKIN AND NAILS PO) Take  by mouth.     acetaminophen (TYLENOL) 325 mg tablet Take 2 Tabs by mouth every four (4) hours as needed for Pain or Fever. magnesium oxide (MAG-OX) 400 mg tablet Take 250 mg by mouth daily. Vit C-Vit E-Copper-ZnOx-Lutein 226-90-0.8-5 mg cap Take  by mouth. COQ10, UBIQUINOL, PO Take  by mouth. cholecalciferol (VITAMIN D3) 25 mcg (1,000 unit) cap Take 2,000 Units by mouth daily. cyanocobalamin 1,000 mcg tablet Take 5,000 mcg by mouth daily. folic acid (FOLVITE) 1 mg tablet Take  by mouth daily. No current facility-administered medications for this visit. Social History     Tobacco Use   Smoking Status Never   Smokeless Tobacco Never       Past Medical History:   Diagnosis Date    CAD (coronary artery disease)     stint    GERD (gastroesophageal reflux disease)     Hyperactivity of bladder     Hypercholesterolemia     Macular degeneration disease     Skin cancer (melanoma) ([address]     Tremor        Past Surgical History:   Procedure Laterality Date    HX HERNIA REPAIR      HX MOHS PROCEDURES      [place] CARDIAC SURG PROCEDURE UNLIST  2019    TAVR       Family History   Problem Relation Age of Onset    Cancer Father             Lung Cancer Father         smoker    Heart Disease Father     Heart Disease Brother     Diabetes Brother     Cancer Brother             Gall Bladder Disease Brother     Diabetes Maternal Grandfather     Prostate Cancer Brother     Migraines Mother                Social History     Socioeconomic History    Marital status:    Tobacco Use    Smoking status: Never    Smokeless tobacco: Never   Vaping Use    Vaping Use: Never used   Substance and Sexual Activity    Alcohol use: Not Currently     Comment: beer or glass of wine nightly     Drug use: Never    Sexual activity: Not Currently     Partners: Female       Review of Systems   Constitutional: Negative. Neurological:  Positive for tremors. Remainder of comprehensive review is negative.      Physical Exam :    Visit Vitals  /78 (BP 1 Location: Left upper arm, BP Patient Position: Sitting, BP Cuff Size: Adult)   Resp 20       General: Well defined, nourished, and groomed individual in no acute distress. Neck: Supple, nontender, no bruits, no pain with resistance to active range of motion. Musculoskeletal: Extremities revealed no edema and had full range of motion of joints. Psych: Good mood and bright affect    NEUROLOGICAL EXAMINATION:    Mental Status: Alert and oriented to person, place, and time    Cranial Nerves:    II, III, IV, VI: Visual acuity grossly intact. Visual fields are normal.    Pupils are equal, round, and reactive to light and accommodation. Extra-ocular movements are full and fluid. Fundoscopic exam was benign, no ptosis or nystagmus. V-XII: Hearing is grossly intact. Facial features are symmetric, with normal sensation and strength. The palate rises symmetrically and the tongue protrudes midline. Sternocleidomastoids 5/5. Motor Examination: Normal tone, bulk, and strength, 5/5 muscle strength throughout. Coordination: Finger to nose was normal. Very slight resting tremor in the left hand and head. Right hand normal.     Gait and Station: Steady while walking. Normal arm swing. No pronator drift. No muscle wasting or fasiculations noted. Reflexes: DTRs 2+ throughout.       Results for orders placed or performed in visit on 10/18/22   LIPID PANEL   Result Value Ref Range    Cholesterol, total 141 <200 MG/DL    Triglyceride 52 <150 MG/DL    HDL Cholesterol 62 MG/DL    LDL, calculated 68.6 0 - 100 MG/DL    VLDL, calculated 10.4 MG/DL    CHOL/HDL Ratio 2.3 0.0 - 5.0     METABOLIC PANEL, COMPREHENSIVE   Result Value Ref Range    Sodium 138 136 - 145 mmol/L    Potassium 4.0 3.5 - 5.1 mmol/L    Chloride 104 97 - 108 mmol/L    CO2 33 (H) 21 - 32 mmol/L    Anion gap 1 (L) 5 - 15 mmol/L    Glucose 79 65 - 100 mg/dL    BUN 18 6 - 20 MG/DL    Creatinine 0.84 0.70 - 1.30 MG/DL BUN/Creatinine ratio 21 (H) 12 - 20      eGFR >60 >60 ml/min/1.73m2    Calcium 9.0 8.5 - 10.1 MG/DL    Bilirubin, total 0.3 0.2 - 1.0 MG/DL    ALT (SGPT) 34 12 - 78 U/L    AST (SGOT) 26 15 - 37 U/L    Alk. phosphatase 124 (H) 45 - 117 U/L    Protein, total 7.3 6.4 - 8.2 g/dL    Albumin 4.1 3.5 - 5.0 g/dL    Globulin 3.2 2.0 - 4.0 g/dL    A-G Ratio 1.3 1.1 - 2.2         No orders of the defined types were placed in this encounter. 1. Essential tremor      Since patient would like to stop his Mysoline and he is only taking 250 mg at night, the patient will break the pill in half for the next week and then stop it. Patient will follow-up in 6 months or sooner if needed.         This note will not be viewable in [employer]

## 2023-02-07 ENCOUNTER — TRANSCRIBE ORDER (OUTPATIENT)
Dept: SCHEDULING | Age: 88
End: 2023-02-07

## 2023-02-07 DIAGNOSIS — J84.10 PULMONARY FIBROSIS (HCC): Primary | ICD-10-CM

## 2023-02-10 ENCOUNTER — OFFICE VISIT (OUTPATIENT)
Dept: INTERNAL MEDICINE CLINIC | Age: 88
End: 2023-02-10
Payer: MEDICARE

## 2023-02-10 ENCOUNTER — TELEPHONE (OUTPATIENT)
Dept: INTERNAL MEDICINE CLINIC | Age: 88
End: 2023-02-10

## 2023-02-10 VITALS
OXYGEN SATURATION: 96 % | BODY MASS INDEX: 25.52 KG/M2 | RESPIRATION RATE: 14 BRPM | HEART RATE: 62 BPM | SYSTOLIC BLOOD PRESSURE: 119 MMHG | HEIGHT: 67 IN | DIASTOLIC BLOOD PRESSURE: 73 MMHG | TEMPERATURE: 97.5 F | WEIGHT: 162.6 LBS

## 2023-02-10 DIAGNOSIS — R05.2 SUBACUTE COUGH: Primary | ICD-10-CM

## 2023-02-10 RX ORDER — PRIMIDONE 250 MG/1
TABLET ORAL 4 TIMES DAILY
COMMUNITY

## 2023-02-10 NOTE — PROGRESS NOTES
Note   Chief Complaint   cough    Skye Teresa is a 80 y.o. male     1. Subacute cough   Dry cough started 6-8 weeks ago  Sore throat  Salt water gargles   Hasn't felt like he had a cold  No post nasal drip   More burping  No fever, chills, hemoptysis,night sweats, unexpected weight loss  Hasn't smoked for 50-60 years   Takes omeprazole 20mg daily   No SOB   Has tried OTC cough syrup with no improvement   No allergy symptoms   Saw Dr. Rica Kawasaki with pulmonology this week, they discussed his symptoms and she recommended an inhaler but he declined at the time   Unclear etiology. Ddx includes gerd vs pulm fibrosis. Rec trial of omeprazole 40mg daily for 2 weeks. If improves cough, consider extending for 2 months. If no improvement, he is agreeable to trying an inhaler. Our office will reach out to theirs to see which one they were recommending      Benefits, risks, possible drug interactions, and side effects of all new medications were reviewed with the patient. Pt verbalized understanding. Return to clinic:  as needed    An electronic signature was used to authenticate this note. Anuj Hoffman MD  [facility]  2/10/2023    Future Appointments   Date Time Provider Jamel Dow   4/18/2023 11:15 AM Felice Denise MD [facility]   5/2/2023 11:20 AM Romana Quick, MD [facility]   7/20/2023 11:00 AM Liliane Mcallister NP [facility]   8/1/2023 11:00 AM [facility]        Objective   Vitals:       Visit Vitals  /73 (BP 1 Location: Left upper arm, BP Patient Position: Sitting, BP Cuff Size: Small adult)   Pulse 62   Temp 97.5 °F (36.4 °C) (Oral)   Resp 14   Ht 5' 7\" (1.702 m)   Wt 162 lb 9.6 oz (73.8 kg)   SpO2 96%   BMI 25.47 kg/m²        Physical Exam  Constitutional:       Appearance: Normal appearance. He is not ill-appearing. Cardiovascular:      Rate and Rhythm: Normal rate and regular rhythm.       Heart sounds: No murmur heard.    No friction rub. No gallop. Pulmonary:      Effort: No respiratory distress. Breath sounds: Rales (right lower base) present. No wheezing or rhonchi (mild, diffuse). Neurological:      Mental Status: He is alert. Current Outpatient Medications   Medication Sig    primidone (MYSOLINE) 250 mg tablet Take  by mouth four (4) times daily. apixaban (Eliquis) 5 mg tablet TAKE 1 TABLET TWICE A DAY    atorvastatin (LIPITOR) 40 mg tablet TAKE 1 TABLET DAILY    Detrol LA 4 mg ER capsule TAKE 1 CAPSULE DAILY    niacin ER (NIASPAN) 500 mg tablet TAKE 1 TABLET DAILY    ipratropium (ATROVENT) 42 mcg (0.06 %) nasal spray 2 Sprays by Both Nostrils route three (3) times daily. omeprazole (PRILOSEC) 20 mg capsule TAKE 1 CAPSULE DAILY    ascorbic acid, vitamin C, (VITAMIN C) 1,000 mg tablet Take 1 Tab by mouth daily. multivitamin with minerals (HAIR,SKIN AND NAILS PO) Take  by mouth. acetaminophen (TYLENOL) 325 mg tablet Take 2 Tabs by mouth every four (4) hours as needed for Pain or Fever. magnesium oxide (MAG-OX) 400 mg tablet Take 250 mg by mouth daily. Vit C-Vit E-Copper-ZnOx-Lutein 226-90-0.8-5 mg cap Take  by mouth. COQ10, UBIQUINOL, PO Take  by mouth. cholecalciferol (VITAMIN D3) 25 mcg (1,000 unit) cap Take 2,000 Units by mouth daily. cyanocobalamin 1,000 mcg tablet Take 5,000 mcg by mouth daily. folic acid (FOLVITE) 1 mg tablet Take  by mouth daily. No current facility-administered medications for this visit.

## 2023-02-27 DIAGNOSIS — K21.9 GASTROESOPHAGEAL REFLUX DISEASE WITHOUT ESOPHAGITIS: ICD-10-CM

## 2023-02-27 RX ORDER — OMEPRAZOLE 20 MG/1
CAPSULE, DELAYED RELEASE ORAL
Qty: 90 CAPSULE | Refills: 3 | Status: SHIPPED | OUTPATIENT
Start: 2023-02-27

## 2023-03-10 DIAGNOSIS — K21.9 GASTROESOPHAGEAL REFLUX DISEASE WITHOUT ESOPHAGITIS: ICD-10-CM

## 2023-03-10 RX ORDER — OMEPRAZOLE 20 MG/1
CAPSULE, DELAYED RELEASE ORAL
Qty: 90 CAPSULE | Refills: 3 | Status: SHIPPED | OUTPATIENT
Start: 2023-03-10

## 2023-03-31 DIAGNOSIS — I25.10 CORONARY ARTERY DISEASE INVOLVING NATIVE CORONARY ARTERY OF NATIVE HEART WITHOUT ANGINA PECTORIS: ICD-10-CM

## 2023-03-31 DIAGNOSIS — E78.5 HYPERLIPIDEMIA, UNSPECIFIED HYPERLIPIDEMIA TYPE: ICD-10-CM

## 2023-03-31 RX ORDER — ATORVASTATIN CALCIUM 40 MG/1
40 TABLET, FILM COATED ORAL DAILY
Qty: 90 TABLET | Refills: 1 | Status: SHIPPED | OUTPATIENT
Start: 2023-03-31

## 2023-04-18 ENCOUNTER — OFFICE VISIT (OUTPATIENT)
Dept: INTERNAL MEDICINE CLINIC | Age: 88
End: 2023-04-18
Payer: MEDICARE

## 2023-04-18 VITALS
TEMPERATURE: 97.5 F | BODY MASS INDEX: 25.68 KG/M2 | HEIGHT: 67 IN | RESPIRATION RATE: 15 BRPM | SYSTOLIC BLOOD PRESSURE: 123 MMHG | HEART RATE: 60 BPM | OXYGEN SATURATION: 97 % | DIASTOLIC BLOOD PRESSURE: 80 MMHG | WEIGHT: 163.6 LBS

## 2023-04-18 DIAGNOSIS — R05.9 COUGH, UNSPECIFIED TYPE: ICD-10-CM

## 2023-04-18 DIAGNOSIS — K21.9 GASTROESOPHAGEAL REFLUX DISEASE WITHOUT ESOPHAGITIS: ICD-10-CM

## 2023-04-18 DIAGNOSIS — G25.0 ESSENTIAL TREMOR: ICD-10-CM

## 2023-04-18 DIAGNOSIS — J84.10 PULMONARY FIBROSIS (HCC): ICD-10-CM

## 2023-04-18 DIAGNOSIS — E78.2 MIXED HYPERLIPIDEMIA: ICD-10-CM

## 2023-04-18 DIAGNOSIS — I48.0 PAF (PAROXYSMAL ATRIAL FIBRILLATION) (HCC): ICD-10-CM

## 2023-04-18 DIAGNOSIS — D69.6 THROMBOCYTOPENIA (HCC): Primary | ICD-10-CM

## 2023-04-18 DIAGNOSIS — G47.00 INSOMNIA, UNSPECIFIED TYPE: ICD-10-CM

## 2023-04-18 PROCEDURE — G8536 NO DOC ELDER MAL SCRN: HCPCS | Performed by: INTERNAL MEDICINE

## 2023-04-18 PROCEDURE — G8510 SCR DEP NEG, NO PLAN REQD: HCPCS | Performed by: INTERNAL MEDICINE

## 2023-04-18 PROCEDURE — 1101F PT FALLS ASSESS-DOCD LE1/YR: CPT | Performed by: INTERNAL MEDICINE

## 2023-04-18 PROCEDURE — G8417 CALC BMI ABV UP PARAM F/U: HCPCS | Performed by: INTERNAL MEDICINE

## 2023-04-18 PROCEDURE — G0463 HOSPITAL OUTPT CLINIC VISIT: HCPCS | Performed by: INTERNAL MEDICINE

## 2023-04-18 PROCEDURE — G8427 DOCREV CUR MEDS BY ELIG CLIN: HCPCS | Performed by: INTERNAL MEDICINE

## 2023-04-18 PROCEDURE — 99214 OFFICE O/P EST MOD 30 MIN: CPT | Performed by: INTERNAL MEDICINE

## 2023-04-18 RX ORDER — OMEPRAZOLE 40 MG/1
40 CAPSULE, DELAYED RELEASE ORAL DAILY
Qty: 90 CAPSULE | Refills: 1 | Status: SHIPPED | OUTPATIENT
Start: 2023-04-18

## 2023-04-18 NOTE — PROGRESS NOTES
Ann Webber is a 80 y.o. male who presents today for Routine (RM18// pt presenting today for routine 6 month follow-up)  . He has a history of   Patient Active Problem List   Diagnosis Code    OAB (overactive bladder) N32.81    Hyperlipidemia E78.5    History of melanoma Z85.820    Gastroesophageal reflux disease K21.9    Murmur R01.1    Essential tremor G25.0    Aortic valve stenosis I35.0    Coronary artery disease involving native heart without angina pectoris, unspecified vessel or lesion type I25.10    CAD (coronary atherosclerotic disease) I25.10    AS (aortic stenosis) I35.0    S/P TAVR (transcatheter aortic valve replacement) Z95.2    PAF (paroxysmal atrial fibrillation) (HCC) I48.0    MCI (mild cognitive impairment) G31.84    Pulmonary fibrosis ([address] [address] J84.10   . Today patient is here for follow-up. Patient had amazing results with ablation of his essential tremor. He has been taken off the primidone. Overall doing well from a neurological standpoint. Pulmonary fibrosis: They have decided to monitor his PFTs given lack of symptoms. Following with [facility]. Continued mild cough, but not getting any  worse. Continued chronic cough. Not on an inhaler. Did try a higher dose PPI, but only for couple weeks. We discussed increasing his omeprazole to 40 mg. Denies any shortness of breath. Will be seeing pulmonologist in the coming weeks. Remains on oral anticoagulation for his history of A-fib. Statin and niacin also continued. Continues to have some mild insomnia. Over-the-counter preparations seem to be helping. We discussed that these are safe to continue    ROS  Review of Systems   Constitutional:  Negative for chills, fever and weight loss. HENT:  Negative for congestion and sore throat. Eyes:  Negative for blurred vision, double vision and photophobia. Respiratory:  Positive for cough. Negative for shortness of breath.     Cardiovascular:  Negative for chest pain, palpitations and leg swelling. Gastrointestinal:  Negative for abdominal pain, constipation, diarrhea, heartburn, nausea and vomiting. Genitourinary:  Negative for dysuria, frequency and urgency. Musculoskeletal:  Negative for joint pain and myalgias. Skin:  Negative for rash. Neurological: Negative. Negative for headaches. Endo/Heme/Allergies:  Does not bruise/bleed easily. Psychiatric/Behavioral:  Negative for memory loss and suicidal ideas. The patient has insomnia. Visit Vitals  /80 (BP 1 Location: Left upper arm, BP Patient Position: Sitting, BP Cuff Size: Adult)   Pulse 60   Temp 97.5 °F (36.4 °C) (Oral)   Resp 15   Ht 5' 7\" (1.702 m)   Wt 163 lb 9.6 oz (74.2 kg)   SpO2 97%   BMI 25.62 kg/m²       Physical Exam  Constitutional:       Appearance: He is well-developed. He is not diaphoretic. HENT:      Head: Normocephalic and atraumatic. Eyes:      Pupils: Pupils are equal, round, and reactive to light. Cardiovascular:      Rate and Rhythm: Normal rate and regular rhythm. Heart sounds: No murmur heard. Pulmonary:      Effort: Pulmonary effort is normal. No respiratory distress. Breath sounds: Normal breath sounds. Musculoskeletal:         General: Normal range of motion. Skin:     General: Skin is warm and dry. Neurological:      Mental Status: He is alert and oriented to person, place, and time. Psychiatric:         Behavior: Behavior normal.         Current Outpatient Medications   Medication Sig    atorvastatin (LIPITOR) 40 mg tablet Take 1 Tablet by mouth daily. omeprazole (PRILOSEC) 20 mg capsule TAKE 1 CAPSULE DAILY    apixaban (Eliquis) 5 mg tablet TAKE 1 TABLET TWICE A DAY    Detrol LA 4 mg ER capsule TAKE 1 CAPSULE DAILY    niacin ER (NIASPAN) 500 mg tablet TAKE 1 TABLET DAILY    ipratropium (ATROVENT) 42 mcg (0.06 %) nasal spray 2 Sprays by Both Nostrils route three (3) times daily.     ascorbic acid, vitamin C, (VITAMIN C) 1,000 mg tablet Take 1 Tablet by mouth daily. multivitamin with minerals (HAIR,SKIN AND NAILS PO) Take  by mouth. acetaminophen (TYLENOL) 325 mg tablet Take 2 Tabs by mouth every four (4) hours as needed for Pain or Fever. magnesium oxide (MAG-OX) 400 mg tablet Take 250 mg by mouth daily. Vit C-Vit E-Copper-ZnOx-Lutein 226-90-0.8-5 mg cap Take  by mouth. COQ10, UBIQUINOL, PO Take  by mouth. cholecalciferol (VITAMIN D3) 25 mcg (1,000 unit) cap Take 2 Capsules by mouth daily. cyanocobalamin 1,000 mcg tablet Take 5 Tablets by mouth daily. folic acid (FOLVITE) 1 mg tablet Take  by mouth daily. No current facility-administered medications for this visit. Past Medical History:   Diagnosis Date    CAD (coronary artery disease)     stint    GERD (gastroesophageal reflux disease)     Hyperactivity of bladder     Hypercholesterolemia     Macular degeneration disease     Skin cancer (melanoma) (HCC)     Tremor       Past Surgical History:   Procedure Laterality Date    HX HERNIA REPAIR      HX MOHS PROCEDURES      HX MOHS PROCEDURES Right 2023    (Nose) Dr Silverio Angelucci with [facility]    [place] UNLISTED PROCEDURE CARDIAC SURGERY  2019    TAVR      Social History     Tobacco Use    Smoking status: Never    Smokeless tobacco: Never   Substance Use Topics    Alcohol use: Not Currently     Comment: beer or glass of wine nightly       Family History   Problem Relation Age of Onset    Cancer Father             Lung Cancer Father         smoker    Heart Disease Father     Heart Disease Brother     Diabetes Brother     Cancer Brother             Gall Bladder Disease Brother     Diabetes Maternal Grandfather     Prostate Cancer Brother     Migraines Mother                 Allergies   Allergen Reactions    Penicillins Hives        Assessment/Plan  Diagnoses and all orders for this visit:    1.  Thrombocytopenia (HCC)-stable, repeat  -     METABOLIC PANEL, COMPREHENSIVE; Future  -     CBC WITH AUTOMATED DIFF; Future    2. PAF (paroxysmal atrial fibrillation) (HCC)-remains on oral anticoagulation  -     METABOLIC PANEL, COMPREHENSIVE; Future  -     CBC WITH AUTOMATED DIFF; Future    3. Pulmonary fibrosis (HCC)-relatively asymptomatic and PFTs have been stable. They are monitoring PFTs through pulmonary. Unclear if this is related to his cough. Ongoing for about 6 months. We will increase his omeprazole to 40 mg for the coming 2 to 3 months and see him back in the office. Onset of allergy season has not made a difference to this cough. 4. Mixed hyperlipidemia    5. Essential tremor-virtually cured with intervention through neurosurgery    6. Gastroesophageal reflux disease without esophagitis  -     omeprazole (PRILOSEC) 40 mg capsule; Take 1 Capsule by mouth daily. 7. Cough, unspecified type  -     omeprazole (PRILOSEC) 40 mg capsule; Take 1 Capsule by mouth daily. 8. Insomnia, unspecified type-okay to continue over-the-counter preparations as needed            Shabbir Mitchell MD  4/18/2023    This note was created with the help of speech recognition software [employer]) and may contain some 'sound alike' errors.

## 2023-04-18 NOTE — PROGRESS NOTES
Jamie Marrero  Identified pt with two pt identifiers(name and ). Chief Complaint   Patient presents with    Routine     RM18// pt presenting today for routine 6 month follow-up       1. Have you been to the ER, urgent care clinic since your last visit? Hospitalized since your last visit? NO    2. Have you seen or consulted any other health care providers outside of the [address] [place] [address] since your last visit? Include any pap smears or colon screening. NO      Provider notified of reason for visit, vitals and flowsheets obtained on patients. Patient received paperwork for advance directive during previous visit but has not completed at this time     Reviewed record In preparation for visit, huddled with provider and have obtained necessary documentation      There are no preventive care reminders to display for this patient. Wt Readings from Last 3 Encounters:   23 163 lb 9.6 oz (74.2 kg)   02/10/23 162 lb 9.6 oz (73.8 kg)   10/18/22 162 lb 3.2 oz (73.6 kg)     Temp Readings from Last 3 Encounters:   23 97.5 °F (36.4 °C) (Oral)   02/10/23 97.5 °F (36.4 °C) (Oral)   10/18/22 97.6 °F (36.4 °C) (Oral)     BP Readings from Last 3 Encounters:   23 123/80   02/10/23 119/73   23 126/78     Pulse Readings from Last 3 Encounters:   23 60   02/10/23 62   10/18/22 (!) 55     Vitals:    23 1113   BP: 123/80   Pulse: 60   Resp: 15   Temp: 97.5 °F (36.4 °C)   TempSrc: Oral   SpO2: 97%   Weight: 163 lb 9.6 oz (74.2 kg)   Height: 5' 7\" (1.702 m)   PainSc:   0 - No pain         Learning Assessment:  :     No flowsheet data found.     Depression Screening:  :     3 most recent PHQ Screens 2023   Little interest or pleasure in doing things Not at all   Feeling down, depressed, irritable, or hopeless Not at all   Total Score PHQ 2 0   Trouble falling or staying asleep, or sleeping too much -   Feeling tired or having little energy -   Poor appetite, weight loss, or overeating -   Feeling bad about yourself - or that you are a failure or have let yourself or your family down -   Trouble concentrating on things such as school, work, reading, or watching TV -   Moving or speaking so slowly that other people could have noticed; or the opposite being so fidgety that others notice -   Thoughts of being better off dead, or hurting yourself in some way -   PHQ 9 Score -   How difficult have these problems made it for you to do your work, take care of your home and get along with others -       Fall Risk Assessment:  :     Fall Risk Assessment, last 12 mths 2/10/2023   Able to walk? Yes   Fall in past 12 months? 0   Do you feel unsteady? 0   Are you worried about falling 0   Is the gait abnormal? -   Number of falls in past 12 months -       Abuse Screening:  :     Abuse Screening Questionnaire 10/15/2022 12/15/2020 6/23/2020 1/21/2020 12/11/2019 11/20/2019 10/16/2019   Do you ever feel afraid of your partner? N N N N N N N   Are you in a relationship with someone who physically or mentally threatens you? N N N N N N N   Is it safe for you to go home? Y Y Y Y Y Y Y       ADL Screening:  :     No flowsheet data found. Medication reconciliation up to date and corrected with patient at this time.

## 2023-04-20 RX ORDER — TOLTERODINE TARTRATE 4 MG/1
CAPSULE, EXTENDED RELEASE ORAL
Qty: 90 CAPSULE | Refills: 3 | Status: SHIPPED | OUTPATIENT
Start: 2023-04-20

## 2023-04-22 ENCOUNTER — TRANSCRIBE ORDERS (OUTPATIENT)
Facility: HOSPITAL | Age: 88
End: 2023-04-22

## 2023-04-22 DIAGNOSIS — J84.10 PULMONARY FIBROSIS (HCC): Primary | ICD-10-CM

## 2023-05-02 ENCOUNTER — OFFICE VISIT (OUTPATIENT)
Dept: CARDIOLOGY CLINIC | Age: 88
End: 2023-05-02
Payer: MEDICARE

## 2023-05-02 VITALS
SYSTOLIC BLOOD PRESSURE: 110 MMHG | BODY MASS INDEX: 25.58 KG/M2 | HEIGHT: 67 IN | WEIGHT: 163 LBS | HEART RATE: 74 BPM | OXYGEN SATURATION: 98 % | DIASTOLIC BLOOD PRESSURE: 70 MMHG

## 2023-05-02 DIAGNOSIS — I48.0 PAF (PAROXYSMAL ATRIAL FIBRILLATION) (HCC): ICD-10-CM

## 2023-05-02 DIAGNOSIS — Z86.718 HISTORY OF DVT (DEEP VEIN THROMBOSIS): ICD-10-CM

## 2023-05-02 DIAGNOSIS — I25.10 CORONARY ARTERY DISEASE INVOLVING NATIVE CORONARY ARTERY OF NATIVE HEART WITHOUT ANGINA PECTORIS: Primary | ICD-10-CM

## 2023-05-02 DIAGNOSIS — Z79.01 CHRONIC ANTICOAGULATION: ICD-10-CM

## 2023-05-02 DIAGNOSIS — Z95.2 S/P TAVR (TRANSCATHETER AORTIC VALVE REPLACEMENT): ICD-10-CM

## 2023-05-02 PROCEDURE — 99214 OFFICE O/P EST MOD 30 MIN: CPT | Performed by: INTERNAL MEDICINE

## 2023-05-02 PROCEDURE — G8432 DEP SCR NOT DOC, RNG: HCPCS | Performed by: INTERNAL MEDICINE

## 2023-05-02 PROCEDURE — G8427 DOCREV CUR MEDS BY ELIG CLIN: HCPCS | Performed by: INTERNAL MEDICINE

## 2023-05-02 PROCEDURE — 1101F PT FALLS ASSESS-DOCD LE1/YR: CPT | Performed by: INTERNAL MEDICINE

## 2023-05-02 PROCEDURE — 93010 ELECTROCARDIOGRAM REPORT: CPT | Performed by: INTERNAL MEDICINE

## 2023-05-02 PROCEDURE — G8536 NO DOC ELDER MAL SCRN: HCPCS | Performed by: INTERNAL MEDICINE

## 2023-05-02 PROCEDURE — G0463 HOSPITAL OUTPT CLINIC VISIT: HCPCS | Performed by: INTERNAL MEDICINE

## 2023-05-02 PROCEDURE — 93005 ELECTROCARDIOGRAM TRACING: CPT | Performed by: INTERNAL MEDICINE

## 2023-05-02 PROCEDURE — 1123F ACP DISCUSS/DSCN MKR DOCD: CPT | Performed by: INTERNAL MEDICINE

## 2023-05-02 PROCEDURE — G8417 CALC BMI ABV UP PARAM F/U: HCPCS | Performed by: INTERNAL MEDICINE

## 2023-05-02 RX ORDER — PRIMIDONE 250 MG/1
TABLET ORAL 4 TIMES DAILY
COMMUNITY

## 2023-05-09 RX ORDER — IPRATROPIUM BROMIDE 42 UG/1
SPRAY, METERED NASAL
Qty: 45 ML | Refills: 7 | Status: SHIPPED | OUTPATIENT
Start: 2023-05-09

## 2023-05-12 RX ORDER — NIACIN 500 MG/1
TABLET, EXTENDED RELEASE ORAL
Qty: 90 TABLET | Refills: 3 | Status: SHIPPED | OUTPATIENT
Start: 2023-05-12

## 2023-05-15 ENCOUNTER — TELEPHONE (OUTPATIENT)
Age: 88
End: 2023-05-15

## 2023-05-15 NOTE — TELEPHONE ENCOUNTER
----- Message from Dorothy Whitt sent at 5/15/2023 11:29 AM EDT -----  Subject: Medication Problem    Medication: Other - omeprazole (PRILOSEC) 40 mg capsule   Dosage: 40mg once daily   Ordering Provider: Tito Meyer    Question/Problem: Patient has been taking this medication that was Rx'd at   his last OV on 4/18/23. He's notice no worsening but also no improvement. He still has a cough and this writer could hear him coughing / getting   choked up. Is there something else he can do? Please call to advise.  Next   OV is 7/19/23 for 3mo f/u      Pharmacy: [id number] - [address], [address] [phone] Tom Vargas [phone]    ---------------------------------------------------------------------------  --------------  Caroline OLSEN  [phone]; OK to leave message on voicemail  ---------------------------------------------------------------------------  --------------    SCRIPT ANSWERS  Relationship to Patient: Self

## 2023-05-15 NOTE — TELEPHONE ENCOUNTER
05/15/2023--Attempted to call patient back to offer appointment tomorrow 05/16 with our NP but he did not answer. Message left for him to call office back along with the number.

## 2023-05-17 RX ORDER — ATORVASTATIN CALCIUM 40 MG/1
40 TABLET, FILM COATED ORAL DAILY
COMMUNITY
Start: 2023-03-31

## 2023-05-17 RX ORDER — OMEPRAZOLE 40 MG/1
40 CAPSULE, DELAYED RELEASE ORAL DAILY
COMMUNITY
Start: 2023-04-18

## 2023-05-17 RX ORDER — PRIMIDONE 250 MG/1
TABLET ORAL 4 TIMES DAILY
COMMUNITY
End: 2023-07-20 | Stop reason: SDUPTHER

## 2023-05-17 RX ORDER — TOLTERODINE 4 MG/1
1 CAPSULE, EXTENDED RELEASE ORAL DAILY
COMMUNITY
Start: 2023-04-20 | End: 2023-07-05 | Stop reason: SDUPTHER

## 2023-05-17 RX ORDER — ACETAMINOPHEN 325 MG/1
650 TABLET ORAL EVERY 4 HOURS PRN
COMMUNITY
Start: 2019-12-21

## 2023-05-17 RX ORDER — FOLIC ACID 1 MG/1
TABLET ORAL DAILY
COMMUNITY

## 2023-05-17 RX ORDER — MAGNESIUM OXIDE 400 MG/1
250 TABLET ORAL DAILY
COMMUNITY

## 2023-05-17 RX ORDER — VIT C/E/CUPERIC/ZINC/LUTEIN 226-90-0.8
CAPSULE ORAL
COMMUNITY

## 2023-07-05 ENCOUNTER — TELEPHONE (OUTPATIENT)
Age: 88
End: 2023-07-05

## 2023-07-05 RX ORDER — TOLTERODINE 4 MG/1
4 CAPSULE, EXTENDED RELEASE ORAL DAILY
Qty: 30 CAPSULE | Refills: 2 | Status: SHIPPED | OUTPATIENT
Start: 2023-07-05 | End: 2023-07-05 | Stop reason: SDUPTHER

## 2023-07-05 RX ORDER — TOLTERODINE 4 MG/1
4 CAPSULE, EXTENDED RELEASE ORAL DAILY
Qty: 90 CAPSULE | Refills: 2 | Status: SHIPPED | OUTPATIENT
Start: 2023-07-05

## 2023-07-05 NOTE — TELEPHONE ENCOUNTER
Patient called needing 1 refill sent to [place] [address] p: [phone]   b/c he needs it now   and then another 90 day refill sent through [facility] p: [phone]

## 2023-07-20 ENCOUNTER — OFFICE VISIT (OUTPATIENT)
Age: 88
End: 2023-07-20
Payer: MEDICARE

## 2023-07-20 VITALS
DIASTOLIC BLOOD PRESSURE: 70 MMHG | RESPIRATION RATE: 20 BRPM | TEMPERATURE: 97.5 F | OXYGEN SATURATION: 96 % | HEART RATE: 58 BPM | SYSTOLIC BLOOD PRESSURE: 134 MMHG

## 2023-07-20 DIAGNOSIS — G25.0 ESSENTIAL TREMOR: Primary | ICD-10-CM

## 2023-07-20 PROCEDURE — G8427 DOCREV CUR MEDS BY ELIG CLIN: HCPCS

## 2023-07-20 PROCEDURE — 1036F TOBACCO NON-USER: CPT

## 2023-07-20 PROCEDURE — 1123F ACP DISCUSS/DSCN MKR DOCD: CPT

## 2023-07-20 PROCEDURE — 99214 OFFICE O/P EST MOD 30 MIN: CPT

## 2023-07-20 PROCEDURE — G8419 CALC BMI OUT NRM PARAM NOF/U: HCPCS

## 2023-07-20 RX ORDER — PRIMIDONE 250 MG/1
250 TABLET ORAL 4 TIMES DAILY
Qty: 360 TABLET | Refills: 1 | Status: SHIPPED | OUTPATIENT
Start: 2023-07-20

## 2023-07-21 ENCOUNTER — OFFICE VISIT (OUTPATIENT)
Age: 88
End: 2023-07-21
Payer: MEDICARE

## 2023-07-21 VITALS
TEMPERATURE: 97.5 F | BODY MASS INDEX: 26.56 KG/M2 | DIASTOLIC BLOOD PRESSURE: 73 MMHG | HEART RATE: 61 BPM | RESPIRATION RATE: 15 BRPM | OXYGEN SATURATION: 95 % | HEIGHT: 67 IN | SYSTOLIC BLOOD PRESSURE: 126 MMHG | WEIGHT: 169.2 LBS

## 2023-07-21 DIAGNOSIS — G25.0 ESSENTIAL TREMOR: ICD-10-CM

## 2023-07-21 DIAGNOSIS — J84.10 PULMONARY FIBROSIS, UNSPECIFIED (HCC): ICD-10-CM

## 2023-07-21 DIAGNOSIS — I48.0 PAROXYSMAL ATRIAL FIBRILLATION (HCC): ICD-10-CM

## 2023-07-21 DIAGNOSIS — E78.2 MIXED HYPERLIPIDEMIA: ICD-10-CM

## 2023-07-21 DIAGNOSIS — G47.00 INSOMNIA, UNSPECIFIED TYPE: ICD-10-CM

## 2023-07-21 DIAGNOSIS — R05.9 COUGH, UNSPECIFIED TYPE: Primary | ICD-10-CM

## 2023-07-21 PROCEDURE — 99214 OFFICE O/P EST MOD 30 MIN: CPT | Performed by: INTERNAL MEDICINE

## 2023-07-21 PROCEDURE — G8419 CALC BMI OUT NRM PARAM NOF/U: HCPCS | Performed by: INTERNAL MEDICINE

## 2023-07-21 PROCEDURE — 1123F ACP DISCUSS/DSCN MKR DOCD: CPT | Performed by: INTERNAL MEDICINE

## 2023-07-21 PROCEDURE — 1036F TOBACCO NON-USER: CPT | Performed by: INTERNAL MEDICINE

## 2023-07-21 PROCEDURE — G8427 DOCREV CUR MEDS BY ELIG CLIN: HCPCS | Performed by: INTERNAL MEDICINE

## 2023-07-21 SDOH — ECONOMIC STABILITY: FOOD INSECURITY: WITHIN THE PAST 12 MONTHS, THE FOOD YOU BOUGHT JUST DIDN'T LAST AND YOU DIDN'T HAVE MONEY TO GET MORE.: NEVER TRUE

## 2023-07-21 SDOH — ECONOMIC STABILITY: HOUSING INSECURITY
IN THE LAST 12 MONTHS, WAS THERE A TIME WHEN YOU DID NOT HAVE A STEADY PLACE TO SLEEP OR SLEPT IN A SHELTER (INCLUDING NOW)?: NO

## 2023-07-21 SDOH — ECONOMIC STABILITY: FOOD INSECURITY: WITHIN THE PAST 12 MONTHS, YOU WORRIED THAT YOUR FOOD WOULD RUN OUT BEFORE YOU GOT MONEY TO BUY MORE.: NEVER TRUE

## 2023-07-21 SDOH — ECONOMIC STABILITY: INCOME INSECURITY: HOW HARD IS IT FOR YOU TO PAY FOR THE VERY BASICS LIKE FOOD, HOUSING, MEDICAL CARE, AND HEATING?: NOT HARD AT ALL

## 2023-07-21 ASSESSMENT — ENCOUNTER SYMPTOMS
BACK PAIN: 0
ABDOMINAL PAIN: 0
DIARRHEA: 0
EYE ITCHING: 0
SORE THROAT: 0
EYE DISCHARGE: 0
CONSTIPATION: 0
WHEEZING: 0
EYE PAIN: 0
FACIAL SWELLING: 0
COLOR CHANGE: 0
SHORTNESS OF BREATH: 0

## 2023-07-21 ASSESSMENT — PATIENT HEALTH QUESTIONNAIRE - PHQ9
6. FEELING BAD ABOUT YOURSELF - OR THAT YOU ARE A FAILURE OR HAVE LET YOURSELF OR YOUR FAMILY DOWN: 0
5. POOR APPETITE OR OVEREATING: 0
1. LITTLE INTEREST OR PLEASURE IN DOING THINGS: 0
4. FEELING TIRED OR HAVING LITTLE ENERGY: 0
SUM OF ALL RESPONSES TO PHQ QUESTIONS 1-9: 0
7. TROUBLE CONCENTRATING ON THINGS, SUCH AS READING THE NEWSPAPER OR WATCHING TELEVISION: 0
9. THOUGHTS THAT YOU WOULD BE BETTER OFF DEAD, OR OF HURTING YOURSELF: 0
2. FEELING DOWN, DEPRESSED OR HOPELESS: 0
SUM OF ALL RESPONSES TO PHQ QUESTIONS 1-9: 0
3. TROUBLE FALLING OR STAYING ASLEEP: 0
SUM OF ALL RESPONSES TO PHQ9 QUESTIONS 1 & 2: 0
8. MOVING OR SPEAKING SO SLOWLY THAT OTHER PEOPLE COULD HAVE NOTICED. OR THE OPPOSITE, BEING SO FIGETY OR RESTLESS THAT YOU HAVE BEEN MOVING AROUND A LOT MORE THAN USUAL: 0
10. IF YOU CHECKED OFF ANY PROBLEMS, HOW DIFFICULT HAVE THESE PROBLEMS MADE IT FOR YOU TO DO YOUR WORK, TAKE CARE OF THINGS AT HOME, OR GET ALONG WITH OTHER PEOPLE: 0
SUM OF ALL RESPONSES TO PHQ QUESTIONS 1-9: 0
SUM OF ALL RESPONSES TO PHQ QUESTIONS 1-9: 0

## 2023-07-21 NOTE — PROGRESS NOTES
Jeni Chanel is a 80 y.o. male who presents today for Follow-up (RM18// Pt presenting today for 3  month follow-up.)  . He has a history of   Patient Active Problem List   Diagnosis    CAD (coronary atherosclerotic disease)    OAB (overactive bladder)    Essential tremor    S/P TAVR (transcatheter aortic valve replacement)    PAF (paroxysmal atrial fibrillation) (HCC)    Hyperlipidemia    History of melanoma    Coronary artery disease involving native heart without angina pectoris, unspecified vessel or lesion type    AS (aortic stenosis)    Gastroesophageal reflux disease    Aortic valve stenosis    Murmur    MCI (mild cognitive impairment)    Pulmonary fibrosis (HCC)    Thrombocytopenia ([address])   . Today patient is here for follow up. We did increase his PPI dose due to chronic cough at last visit. Since he reports his cough has been much better. Exercise tolerance has been stable. Insomnia: When I last saw his wife he was reporting worsening insomnia. He had stopped primidone due to good results from ultrasound-guided ablation of his tremor. We discussed that he could resume a low-dose in the evening. Since he reports his sleep has been better. Afib: on [address] No palpations. Hyperlipidemia  Remains on statin therapy  ROS: taking medications as instructed, no medication side effects noted  No new myalgias, no joint pains, no weakness  No TIA's, no chest pain on exertion, no dyspnea on exertion, no swelling of ankles. Lab Results   Component Value Date/Time    CHOL 141 10/18/2022 12:00 PM    HDL 62 10/18/2022 12:00 PM     Pulmonary fibrosis pulm patient asymptomatic. We will be monitoring PFTs    ROS  Review of Systems   Constitutional:  Negative for activity change, appetite change, fever and unexpected weight change. HENT:  Negative for congestion, dental problem, facial swelling and sore throat. Eyes:  Negative for pain, discharge and itching.    Respiratory:  Negative for

## 2023-08-01 ENCOUNTER — HOSPITAL ENCOUNTER (OUTPATIENT)
Facility: HOSPITAL | Age: 88
Discharge: HOME | End: 2023-08-04
Payer: MEDICARE

## 2023-08-01 DIAGNOSIS — J84.10 PULMONARY FIBROSIS (HCC): ICD-10-CM

## 2023-08-01 PROCEDURE — 71250 CT THORAX DX C-: CPT

## 2023-08-10 NOTE — Clinical Note
Catheter removed. Referral to ENT placed.  All of our ENT providers are highly recommended.  It may be worth noting that Dr. Atwood  is also a plastic surgeon

## 2023-09-05 RX ORDER — ATORVASTATIN CALCIUM 40 MG/1
TABLET, FILM COATED ORAL
Qty: 90 TABLET | Refills: 3 | Status: SHIPPED | OUTPATIENT
Start: 2023-09-05

## 2023-10-05 ENCOUNTER — OFFICE VISIT (OUTPATIENT)
Age: 88
End: 2023-10-05
Payer: MEDICARE

## 2023-10-05 VITALS
RESPIRATION RATE: 20 BRPM | OXYGEN SATURATION: 96 % | SYSTOLIC BLOOD PRESSURE: 112 MMHG | HEART RATE: 69 BPM | DIASTOLIC BLOOD PRESSURE: 80 MMHG

## 2023-10-05 DIAGNOSIS — G25.0 ESSENTIAL TREMOR: Primary | ICD-10-CM

## 2023-10-05 DIAGNOSIS — G47.9 SLEEP DISTURBANCE: ICD-10-CM

## 2023-10-05 PROCEDURE — 1123F ACP DISCUSS/DSCN MKR DOCD: CPT

## 2023-10-05 PROCEDURE — 99214 OFFICE O/P EST MOD 30 MIN: CPT

## 2023-10-05 PROCEDURE — G8419 CALC BMI OUT NRM PARAM NOF/U: HCPCS

## 2023-10-05 PROCEDURE — 1036F TOBACCO NON-USER: CPT

## 2023-10-05 PROCEDURE — G8484 FLU IMMUNIZE NO ADMIN: HCPCS

## 2023-10-05 PROCEDURE — G8427 DOCREV CUR MEDS BY ELIG CLIN: HCPCS

## 2023-10-23 RX ORDER — OMEPRAZOLE 40 MG/1
40 CAPSULE, DELAYED RELEASE ORAL DAILY
Qty: 90 CAPSULE | Refills: 3 | Status: SHIPPED | OUTPATIENT
Start: 2023-10-23

## 2023-11-02 ENCOUNTER — OFFICE VISIT (OUTPATIENT)
Age: 88
End: 2023-11-02
Payer: MEDICARE

## 2023-11-02 VITALS
SYSTOLIC BLOOD PRESSURE: 136 MMHG | HEART RATE: 60 BPM | WEIGHT: 169 LBS | DIASTOLIC BLOOD PRESSURE: 80 MMHG | OXYGEN SATURATION: 97 % | BODY MASS INDEX: 26.53 KG/M2 | HEIGHT: 67 IN

## 2023-11-02 DIAGNOSIS — G25.0 ESSENTIAL TREMOR: ICD-10-CM

## 2023-11-02 DIAGNOSIS — I35.0 NONRHEUMATIC AORTIC (VALVE) STENOSIS: ICD-10-CM

## 2023-11-02 DIAGNOSIS — E78.2 MIXED HYPERLIPIDEMIA: ICD-10-CM

## 2023-11-02 DIAGNOSIS — I48.0 PAF (PAROXYSMAL ATRIAL FIBRILLATION) (HCC): ICD-10-CM

## 2023-11-02 DIAGNOSIS — I25.10 ATHEROSCLEROSIS OF NATIVE CORONARY ARTERY OF NATIVE HEART WITHOUT ANGINA PECTORIS: Primary | ICD-10-CM

## 2023-11-02 PROCEDURE — 99214 OFFICE O/P EST MOD 30 MIN: CPT | Performed by: INTERNAL MEDICINE

## 2023-11-02 PROCEDURE — 1123F ACP DISCUSS/DSCN MKR DOCD: CPT | Performed by: INTERNAL MEDICINE

## 2023-11-02 PROCEDURE — 1036F TOBACCO NON-USER: CPT | Performed by: INTERNAL MEDICINE

## 2023-11-02 PROCEDURE — G8484 FLU IMMUNIZE NO ADMIN: HCPCS | Performed by: INTERNAL MEDICINE

## 2023-11-02 PROCEDURE — G8419 CALC BMI OUT NRM PARAM NOF/U: HCPCS | Performed by: INTERNAL MEDICINE

## 2023-11-02 PROCEDURE — G8427 DOCREV CUR MEDS BY ELIG CLIN: HCPCS | Performed by: INTERNAL MEDICINE

## 2023-11-03 LAB
ALBUMIN SERPL-MCNC: 4.4 G/DL (ref 3.7–4.7)
ALBUMIN/GLOB SERPL: 1.8 {RATIO} (ref 1.2–2.2)
ALP SERPL-CCNC: 103 IU/L (ref 44–121)
ALT SERPL-CCNC: 17 IU/L (ref 0–44)
AST SERPL-CCNC: 21 IU/L (ref 0–40)
BASOPHILS # BLD AUTO: 0 X10E3/UL (ref 0–0.2)
BASOPHILS NFR BLD AUTO: 1 %
BILIRUB SERPL-MCNC: <0.2 MG/DL (ref 0–1.2)
BUN SERPL-MCNC: 15 MG/DL (ref 8–27)
BUN/CREAT SERPL: 19 (ref 10–24)
CALCIUM SERPL-MCNC: 9.3 MG/DL (ref 8.6–10.2)
CHLORIDE SERPL-SCNC: 100 MMOL/L (ref 96–106)
CHOLEST SERPL-MCNC: 133 MG/DL (ref 100–199)
CO2 SERPL-SCNC: 28 MMOL/L (ref 20–29)
CREAT SERPL-MCNC: 0.77 MG/DL (ref 0.76–1.27)
EGFRCR SERPLBLD CKD-EPI 2021: 86 ML/MIN/1.73
EOSINOPHIL # BLD AUTO: 0.2 X10E3/UL (ref 0–0.4)
EOSINOPHIL NFR BLD AUTO: 4 %
ERYTHROCYTE [DISTWIDTH] IN BLOOD BY AUTOMATED COUNT: 15.7 % (ref 11.6–15.4)
GLOBULIN SER CALC-MCNC: 2.5 G/DL (ref 1.5–4.5)
GLUCOSE SERPL-MCNC: 86 MG/DL (ref 70–99)
HCT VFR BLD AUTO: 38.9 % (ref 37.5–51)
HDLC SERPL-MCNC: 51 MG/DL
HGB BLD-MCNC: 11.3 G/DL (ref 13–17.7)
IMM GRANULOCYTES # BLD AUTO: 0 X10E3/UL (ref 0–0.1)
IMM GRANULOCYTES NFR BLD AUTO: 0 %
IMP & REVIEW OF LAB RESULTS: NORMAL
LDLC SERPL CALC-MCNC: 70 MG/DL (ref 0–99)
LYMPHOCYTES # BLD AUTO: 1.8 X10E3/UL (ref 0.7–3.1)
LYMPHOCYTES NFR BLD AUTO: 33 %
MCH RBC QN AUTO: 22.7 PG (ref 26.6–33)
MCHC RBC AUTO-ENTMCNC: 29 G/DL (ref 31.5–35.7)
MCV RBC AUTO: 78 FL (ref 79–97)
MONOCYTES # BLD AUTO: 0.5 X10E3/UL (ref 0.1–0.9)
MONOCYTES NFR BLD AUTO: 9 %
NEUTROPHILS # BLD AUTO: 2.8 X10E3/UL (ref 1.4–7)
NEUTROPHILS NFR BLD AUTO: 53 %
PLATELET # BLD AUTO: 170 X10E3/UL (ref 150–450)
POTASSIUM SERPL-SCNC: 4.7 MMOL/L (ref 3.5–5.2)
PROT SERPL-MCNC: 6.9 G/DL (ref 6–8.5)
RBC # BLD AUTO: 4.98 X10E6/UL (ref 4.14–5.8)
SODIUM SERPL-SCNC: 139 MMOL/L (ref 134–144)
TRIGL SERPL-MCNC: 54 MG/DL (ref 0–149)
VLDLC SERPL CALC-MCNC: 12 MG/DL (ref 5–40)
WBC # BLD AUTO: 5.4 X10E3/UL (ref 3.4–10.8)

## 2023-11-13 ENCOUNTER — TELEPHONE (OUTPATIENT)
Age: 88
End: 2023-11-13

## 2023-11-13 NOTE — TELEPHONE ENCOUNTER
----- Message from Dez Vaughan MD sent at 11/11/2023  3:09 PM EST -----  Message not read, please call with recs.

## 2023-11-13 NOTE — TELEPHONE ENCOUNTER
11/13/2023--I called and spoke with the patient and let him know his lab results and our recommendation's. Patient verbalized understanding and had no question's or concerns at that time.

## 2023-12-12 NOTE — PROGRESS NOTES
He was on 1  BID and then he himself increased to twice a day and still isn't quite there yet
SURGERY  2019    TAVR       Family History   Problem Relation Age of Onset    Migraines Mother             Prostate Cancer Brother     Cancer Brother             Gall Bladder Disease Brother     Diabetes Maternal Grandfather     Lung Cancer Father         smoker    Heart Disease Father     Cancer Father             Diabetes Brother     Heart Disease Brother        Social History     Socioeconomic History    Marital status:    Tobacco Use    Smoking status: Never    Smokeless tobacco: Never   Substance and Sexual Activity    Alcohol use: Not Currently    Drug use: Never     Social Determinants of Health     Financial Resource Strain: Low Risk  (2023)    Overall Financial Resource Strain (CARDIA)     Difficulty of Paying Living Expenses: Not hard at all   Food Insecurity: No Food Insecurity (2023)    Hunger Vital Sign     Worried About Running Out of Food in the Last Year: Never true     [address] in the Last Year: Never true   Transportation Needs: Unknown (2023)    PRAPARE - Transportation     Lack of Transportation (Non-Medical): No   Housing Stability: Unknown (2023)    Housing Stability Vital Sign     Unstable Housing in the Last Year: No       Review of Systems   Constitutional: Negative. Neurological:  Positive for tremors. Psychiatric/Behavioral:  Positive for sleep disturbance. Remainder of comprehensive review is negative. Physical Exam :    /80 (Site: Right Upper Arm, Position: Sitting, Cuff Size: Large Adult)   Pulse 69   Resp 20   SpO2 96%     General: Well defined, nourished, and groomed individual in no acute distress. Neck: Supple, nontender, no bruits, no pain with resistance to active range of motion. Musculoskeletal: Extremities revealed no edema and had full range of motion of joints.     Psych: Good mood and bright affect    NEUROLOGICAL EXAMINATION:    Mental Status: Alert and oriented to person, place,
alert

## 2023-12-28 PROBLEM — F32.1 MAJOR DEPRESSIVE DISORDER, SINGLE EPISODE, MODERATE (HCC): Status: ACTIVE | Noted: 2023-12-28

## 2023-12-29 ENCOUNTER — OFFICE VISIT (OUTPATIENT)
Age: 88
End: 2023-12-29
Payer: MEDICARE

## 2023-12-29 VITALS
BODY MASS INDEX: 26.53 KG/M2 | TEMPERATURE: 97.5 F | WEIGHT: 169 LBS | DIASTOLIC BLOOD PRESSURE: 81 MMHG | HEART RATE: 66 BPM | HEIGHT: 67 IN | RESPIRATION RATE: 16 BRPM | SYSTOLIC BLOOD PRESSURE: 123 MMHG | OXYGEN SATURATION: 98 %

## 2023-12-29 DIAGNOSIS — R05.9 COUGH, UNSPECIFIED TYPE: ICD-10-CM

## 2023-12-29 DIAGNOSIS — J84.10 PULMONARY FIBROSIS (HCC): ICD-10-CM

## 2023-12-29 DIAGNOSIS — G31.84 MCI (MILD COGNITIVE IMPAIRMENT): ICD-10-CM

## 2023-12-29 DIAGNOSIS — D64.9 ANEMIA, UNSPECIFIED TYPE: ICD-10-CM

## 2023-12-29 DIAGNOSIS — Z95.2 S/P TAVR (TRANSCATHETER AORTIC VALVE REPLACEMENT): ICD-10-CM

## 2023-12-29 DIAGNOSIS — I48.0 PAF (PAROXYSMAL ATRIAL FIBRILLATION) (HCC): ICD-10-CM

## 2023-12-29 DIAGNOSIS — I25.10 ATHEROSCLEROSIS OF NATIVE CORONARY ARTERY OF NATIVE HEART WITHOUT ANGINA PECTORIS: ICD-10-CM

## 2023-12-29 DIAGNOSIS — G25.0 ESSENTIAL TREMOR: ICD-10-CM

## 2023-12-29 DIAGNOSIS — F32.1 MAJOR DEPRESSIVE DISORDER, SINGLE EPISODE, MODERATE (HCC): ICD-10-CM

## 2023-12-29 DIAGNOSIS — Z00.00 MEDICARE ANNUAL WELLNESS VISIT, SUBSEQUENT: Primary | ICD-10-CM

## 2023-12-29 LAB
BASOPHILS # BLD: 0.1 K/UL (ref 0–0.1)
BASOPHILS NFR BLD: 1 % (ref 0–1)
DIFFERENTIAL METHOD BLD: ABNORMAL
EOSINOPHIL # BLD: 0.2 K/UL (ref 0–0.4)
EOSINOPHIL NFR BLD: 3 % (ref 0–7)
ERYTHROCYTE [DISTWIDTH] IN BLOOD BY AUTOMATED COUNT: 24.9 % (ref 11.5–14.5)
FERRITIN SERPL-MCNC: 22 NG/ML (ref 26–388)
HCT VFR BLD AUTO: 46.6 % (ref 36.6–50.3)
HGB BLD-MCNC: 14 G/DL (ref 12.1–17)
IMM GRANULOCYTES # BLD AUTO: 0 K/UL (ref 0–0.04)
IMM GRANULOCYTES NFR BLD AUTO: 0 % (ref 0–0.5)
IRON SATN MFR SERPL: 49 % (ref 20–50)
IRON SERPL-MCNC: 174 UG/DL (ref 35–150)
LYMPHOCYTES # BLD: 1.7 K/UL (ref 0.8–3.5)
LYMPHOCYTES NFR BLD: 30 % (ref 12–49)
MCH RBC QN AUTO: 25.5 PG (ref 26–34)
MCHC RBC AUTO-ENTMCNC: 30 G/DL (ref 30–36.5)
MCV RBC AUTO: 84.7 FL (ref 80–99)
MONOCYTES # BLD: 0.5 K/UL (ref 0–1)
MONOCYTES NFR BLD: 9 % (ref 5–13)
NEUTS SEG # BLD: 3.1 K/UL (ref 1.8–8)
NEUTS SEG NFR BLD: 57 % (ref 32–75)
NRBC # BLD: 0 K/UL (ref 0–0.01)
NRBC BLD-RTO: 0 PER 100 WBC
PLATELET # BLD AUTO: 122 K/UL (ref 150–400)
RBC # BLD AUTO: 5.5 M/UL (ref 4.1–5.7)
RBC MORPH BLD: ABNORMAL
TIBC SERPL-MCNC: 356 UG/DL (ref 250–450)
WBC # BLD AUTO: 5.6 K/UL (ref 4.1–11.1)

## 2023-12-29 PROCEDURE — G8484 FLU IMMUNIZE NO ADMIN: HCPCS | Performed by: INTERNAL MEDICINE

## 2023-12-29 PROCEDURE — 1123F ACP DISCUSS/DSCN MKR DOCD: CPT | Performed by: INTERNAL MEDICINE

## 2023-12-29 PROCEDURE — G0439 PPPS, SUBSEQ VISIT: HCPCS | Performed by: INTERNAL MEDICINE

## 2023-12-29 RX ORDER — FLUTICASONE PROPIONATE 50 MCG
2 SPRAY, SUSPENSION (ML) NASAL DAILY
Qty: 48 G | Refills: 1 | Status: SHIPPED | OUTPATIENT
Start: 2023-12-29

## 2023-12-29 NOTE — PATIENT INSTRUCTIONS
Visit Summary for your review. Other Preventive Recommendations:    A preventive eye exam performed by an eye specialist is recommended every 1-2 years to screen for glaucoma; cataracts, macular degeneration, and other eye disorders. A preventive dental visit is recommended every 6 months. Try to get at least 150 minutes of exercise per week or 10,000 steps per day on a pedometer . Order or download the FREE \"Exercise & Physical Activity: Your Everyday Guide\" from [employer]. Call 3-648.990.7887 or search [employer] online. You need 0872-3384 mg of calcium and 8499-2324 IU of vitamin D per day. It is possible to meet your calcium requirement with diet alone, but a vitamin D supplement is usually necessary to meet this goal.  When exposed to the sun, use a sunscreen that protects against both UVA and UVB radiation with an SPF of 30 or greater. Reapply every 2 to 3 hours or after sweating, drying off with a towel, or swimming. Always wear a seat belt when traveling in a car. Always wear a helmet when riding a bicycle or motorcycle.

## 2024-01-15 NOTE — PROGRESS NOTES
Henri Salmon is a 89 y.o. male who presents with the following  Chief Complaint   Patient presents with    Follow-up     Med eval increased mysoline      Last office visit note  HPI  Patient is here today with his wife for essential tremor follow-up.  Since the patient was here last, he has gradually increased his Mysoline from half a pill each evening up to 1 pill at night and 1 pill in the morning.  He is still having significant head tremor and some left hand tremor.  His right hand is still doing well after having the focused ultrasound with Dr. Galeana.  Today the patient tells me that he is also having trouble staying asleep.  He says that he will fall asleep and then wake up either 1 or 2 hours later will stay up until 5 or 6 AM and then go back to sleep again.  He says this has been going on for 6 months or longer now.  He uses over-the-counter sleep aids but these do not help.  He feels that the Mysoline at higher doses used to make him sleep better.  He is getting ready to start physical therapy ordered by his orthopedic doctor for hip pain that the physician believes is muscular in nature.    Advised the patient since he felt that the Mysoline helped with his sleep in the past, will increase his dose again from 1 pill in the morning and night to 1 pill in the morning and 2 pills each night.  Hopefully this will help with the tremor and with sleep.  Did advise the patient that if after taking 3 pills a day things are no better, he may increase the dose back to 4 pills a day which was the dose that he took in the past.  Patient may follow-up in 3 months to see how he is feeling.    Today's office visit note  HPI  Patient is here today with his wife for essential tremor follow-up.  At the patient's last visit we increased his Mysoline dose to 250 mg 2 pills at night and 1 pill every morning with the ability to increase to 2 pills in the morning and 2 pills at night if still needed.  Today the patient

## 2024-01-16 ENCOUNTER — OFFICE VISIT (OUTPATIENT)
Age: 89
End: 2024-01-16
Payer: MEDICARE

## 2024-01-16 VITALS
SYSTOLIC BLOOD PRESSURE: 118 MMHG | HEART RATE: 56 BPM | RESPIRATION RATE: 20 BRPM | DIASTOLIC BLOOD PRESSURE: 72 MMHG | OXYGEN SATURATION: 97 %

## 2024-01-16 DIAGNOSIS — G25.0 ESSENTIAL TREMOR: Primary | ICD-10-CM

## 2024-01-16 DIAGNOSIS — R41.3 MEMORY CHANGES: ICD-10-CM

## 2024-01-16 DIAGNOSIS — G25.0 ESSENTIAL TREMOR: ICD-10-CM

## 2024-01-16 PROCEDURE — 1123F ACP DISCUSS/DSCN MKR DOCD: CPT

## 2024-01-16 PROCEDURE — 1036F TOBACCO NON-USER: CPT

## 2024-01-16 PROCEDURE — 99214 OFFICE O/P EST MOD 30 MIN: CPT

## 2024-01-16 PROCEDURE — G8419 CALC BMI OUT NRM PARAM NOF/U: HCPCS

## 2024-01-16 PROCEDURE — G8428 CUR MEDS NOT DOCUMENT: HCPCS

## 2024-01-16 PROCEDURE — G8484 FLU IMMUNIZE NO ADMIN: HCPCS

## 2024-01-16 RX ORDER — PRIMIDONE 250 MG/1
TABLET ORAL
Qty: 360 TABLET | Refills: 3 | Status: SHIPPED | OUTPATIENT
Start: 2024-01-16

## 2024-01-16 NOTE — PROGRESS NOTES
Just doing the 2 at night   HE forgot he was supposed to be taking the one in the morning   Tremors- they haven't been to bad   The 2 at night with his sleeping pill at night helps him sleep well

## 2024-01-17 ENCOUNTER — TELEPHONE (OUTPATIENT)
Age: 89
End: 2024-01-17

## 2024-01-17 NOTE — TELEPHONE ENCOUNTER
Patient stated he has decided to take the memory medication that Eveline suggested. He cannot think of the name. He stated it can be sent to the [facility]

## 2024-01-19 DIAGNOSIS — R41.3 MEMORY CHANGES: Primary | ICD-10-CM

## 2024-01-19 RX ORDER — DONEPEZIL HYDROCHLORIDE 5 MG/1
5 TABLET, FILM COATED ORAL NIGHTLY
Qty: 30 TABLET | Refills: 0 | Status: SHIPPED | OUTPATIENT
Start: 2024-01-19

## 2024-01-19 RX ORDER — APIXABAN 5 MG/1
5 TABLET, FILM COATED ORAL 2 TIMES DAILY
Qty: 180 TABLET | Refills: 3 | Status: SHIPPED | OUTPATIENT
Start: 2024-01-19

## 2024-01-19 RX ORDER — DONEPEZIL HYDROCHLORIDE 10 MG/1
10 TABLET, FILM COATED ORAL NIGHTLY
Qty: 90 TABLET | Refills: 0 | Status: SHIPPED | OUTPATIENT
Start: 2024-02-16

## 2024-01-22 NOTE — TELEPHONE ENCOUNTER
Returned his call to remind him of the medication that he was agreeing to take. Medication sent into the pharmacy. He has verbalized understanding.

## 2024-04-18 RX ORDER — NIACIN 500 MG/1
TABLET, EXTENDED RELEASE ORAL
Qty: 90 TABLET | Refills: 3 | Status: SHIPPED | OUTPATIENT
Start: 2024-04-18

## 2024-04-30 NOTE — PROGRESS NOTES
Henri Salmon is a 89 y.o. male who presents with the following  Chief Complaint   Patient presents with    Follow-up     Tremors, tammie      Last office visit note  HPI  Patient is here today with his wife for essential tremor follow-up.  At the patient's last visit we increased his Mysoline dose to 250 mg 2 pills at night and 1 pill every morning with the ability to increase to 2 pills in the morning and 2 pills at night if still needed.  Today the patient states that he has been forgetting to take the morning dose and has only been taking the 2 pills every evening.  He says that it is helping him sleep better.  He still has head tremor and tremor of the left hand.  His right hand is still okay and he is able to write which makes him happy.  Today the patient's wife brings up a new concern about the patient's short-term memory.  She says his memory has been getting worse slowly over the years.  She says that he forgets conversations pretty quickly, has asked where to place an eating utensil in the kitchen which he says is because she is particular about where things goes but she says he should know these things by now.  She denies that he repeats himself because she says he does not talk much.  The patient is open to performing the brain check to see if there is an indication for further memory testing.    Patient will remember to increase his Mysoline 250 mg.  He should be taking 1 pill in the morning and 2 pills every evening.  Patient would like to do the brain check test for his wife's concerns about his memory to see if it warrants further investigation.  Patient will follow-up in 3 months for brain check and for follow-up.     Return in about 3 months (around 4/16/2024).     Today's office visit note  HPI  Patient is here today with his wife for memory and tremor follow-up.  Since the patient was here last, he did start Aricept in the customary fashion and is now taking 10 mg daily for concerns that

## 2024-05-01 ENCOUNTER — PROCEDURE VISIT (OUTPATIENT)
Age: 89
End: 2024-05-01

## 2024-05-01 ENCOUNTER — OFFICE VISIT (OUTPATIENT)
Age: 89
End: 2024-05-01

## 2024-05-01 VITALS
DIASTOLIC BLOOD PRESSURE: 68 MMHG | SYSTOLIC BLOOD PRESSURE: 124 MMHG | RESPIRATION RATE: 20 BRPM | OXYGEN SATURATION: 95 % | HEART RATE: 51 BPM

## 2024-05-01 DIAGNOSIS — G25.0 ESSENTIAL TREMOR: ICD-10-CM

## 2024-05-01 DIAGNOSIS — R41.3 MEMORY CHANGES: Primary | ICD-10-CM

## 2024-05-01 DIAGNOSIS — R05.9 COUGH, UNSPECIFIED TYPE: ICD-10-CM

## 2024-05-01 DIAGNOSIS — R41.3 MEMORY CHANGES: ICD-10-CM

## 2024-05-01 RX ORDER — FLUTICASONE PROPIONATE 50 MCG
2 SPRAY, SUSPENSION (ML) NASAL DAILY
Qty: 48 G | Refills: 1 | Status: SHIPPED | OUTPATIENT
Start: 2024-05-01

## 2024-05-01 RX ORDER — DONEPEZIL HYDROCHLORIDE 10 MG/1
10 TABLET, FILM COATED ORAL NIGHTLY
Qty: 90 TABLET | Refills: 3 | Status: SHIPPED | OUTPATIENT
Start: 2024-05-01

## 2024-05-01 NOTE — PROGRESS NOTES
Tremors- have not been good   Has been taking his medication 1 in the am and 2 in the evening     [place]     [address] (16 minute minimum)  16__ minutes were spent administering cognitive testing by medical assistant/nurse.     Cognitive Testing Evaluation     Introduction:     Henri Salmon  1934-11-13  Male   This 89 year old Male was administered a battery of neurocognitive testing on 05/01/2024.     Tests Administered:     Trails A, Trails B, Digit Symbol Substitution, Stroop, Immediate Recognition, Delayed Recognition   The combined test administration time was 13 minutes   Test Results:   Cognitive testing was provided via a battery of cognitive assessments. The pattern of test scores indicate that results are valid.  A Clinical Report with further description of scores and results is also available.   Overall: Patient tested in the 20th percentile (standard score of 87).   Trails A: Patient tested in the 56th percentile (scaled standard score of 102).  Trails B: Patient tested in the 68th percentile (scaled standard score of 107).  Digit Symbol Substitution: Patient tested in the 41st percentile (scaled standard score of 96).  Stroop: Patient tested in the 2nd percentile (scaled standard score of 70).  Immediate Recognition: Patient tested in the 39th percentile (scaled standard score of 96).  Delayed Recognition: Patient tested in the 12th percentile (scaled standard score of 82).      Interpretation of Test Scores:     Examination of individual component tests shows:   Attention - Trails A: unlikely impairment  Mental Flexibility - Trails B: unlikely impairment  Executive Function - Digit Symbol Substitution: unlikely impairment  Executive Function - Stroop: possible impairment  Memory - Immediate Recognition: unlikely impairment  Memory - Delayed Recognition: possible impairment    The patient’s overall cognitive test performance was in the 20th percentile when compared to individuals of a similar age

## 2024-05-26 NOTE — PROGRESS NOTES
Mike Gomez MD      [id number],[facility] [place] [place] [address]      Office [phone],Fax [phone]                  Dear Dr Cooley,      I had the pleasure of seeing Mr. Salmon in the office today.               Assessment:   Severe AS- S/p TAVR  CAD - s/p LAD stent   PAF   Chronic anticoagulation   HLD   Hx of GERD   Sinus bradycardia   Tremors/Balance issues -improved after transcranial U/s (neurosurgery-Dr. Galeana.)/Followed by Dr. King   Pulmonary fibrosis/pulmonary hypertension  Weight loss-14 pound weight loss since last visit     Plan:       Blood pressure controlled   Followed by Dr. Her/pulmonary for pulmonary fibrosis.        Not on B blockers sec to bradycardia. HR in 50-60's off B blockers      LDL 68 10/18/22.  Lab work ordered by PCP.      On Eliquis      Aggressive cardiovascular risk factor modification.      F/u 6 mths/earlier prn               Medication Side Effects and Warnings were discussed with patient: yes   Patient Labs were reviewed and or requested:  yes   Patient Past Records were reviewed and or requested: yes      I appreciate the opportunity to be involved in . Please see note below for details. Please do not hesitate to contact us with questions or concerns.            iMke Gomez MD      Cardiac Testing/ Procedures:      A.Cardiac Cath/PCI:12/4/19 ( Dr Corey) Findings:   1)Severe mid LAD calcified disease   2)S/p PCI with 3 by 38 mm Xience MANPREET placement following Rotational atherectomy with 1.75 mm Shilpi. Post dilatation with 3.25 mm Bballoon at 20-24 KEVIN. Good final result. Moderate distal disease deferred.       Access: Right radial--no issues       11/6/19 R Brachial vein access - 7 F sheath   R Radial access - 6 F sheath       Calcified coronaries   RCA - 3DRC; LCA - JL4   Codominant       L Main: Large; MLI       LAD: Prox - large; Mid/Distal Med; Ostial 30%; Mid - Ca+++; 80% ( haziness prob sec to Ca+);

## 2024-05-31 ENCOUNTER — OFFICE VISIT (OUTPATIENT)
Age: 89
End: 2024-05-31
Payer: MEDICARE

## 2024-05-31 VITALS
HEIGHT: 67 IN | BODY MASS INDEX: 24.42 KG/M2 | OXYGEN SATURATION: 94 % | SYSTOLIC BLOOD PRESSURE: 120 MMHG | HEART RATE: 54 BPM | WEIGHT: 155.6 LBS | DIASTOLIC BLOOD PRESSURE: 78 MMHG

## 2024-05-31 DIAGNOSIS — Z95.2 S/P TAVR (TRANSCATHETER AORTIC VALVE REPLACEMENT): Primary | ICD-10-CM

## 2024-05-31 DIAGNOSIS — I48.0 PAF (PAROXYSMAL ATRIAL FIBRILLATION) (HCC): ICD-10-CM

## 2024-05-31 DIAGNOSIS — I48.0 PAROXYSMAL ATRIAL FIBRILLATION (HCC): ICD-10-CM

## 2024-05-31 DIAGNOSIS — I25.10 ATHEROSCLEROSIS OF NATIVE CORONARY ARTERY OF NATIVE HEART WITHOUT ANGINA PECTORIS: ICD-10-CM

## 2024-05-31 DIAGNOSIS — G30.9 ALZHEIMER'S DISEASE, UNSPECIFIED (CODE) (HCC): ICD-10-CM

## 2024-05-31 DIAGNOSIS — D68.69 SECONDARY HYPERCOAGULABLE STATE (HCC): ICD-10-CM

## 2024-05-31 PROCEDURE — 93005 ELECTROCARDIOGRAM TRACING: CPT | Performed by: INTERNAL MEDICINE

## 2024-05-31 PROCEDURE — 99214 OFFICE O/P EST MOD 30 MIN: CPT | Performed by: INTERNAL MEDICINE

## 2024-05-31 NOTE — PROGRESS NOTES
Chief Complaint   Patient presents with    Coronary Artery Disease    Atrial Fibrillation    Hyperlipidemia    Other     TAVR    Bradycardia     Vitals:    05/31/24 1521   BP: 120/78   Site: Left Upper Arm   Position: Sitting   Pulse: 54   SpO2: 94%   Weight: 70.6 kg (155 lb 9.6 oz)   Height: 1.702 m (5' 7\")         Chest pain: DENIED     Recent hospital stays: DENIED     Refills: DENIED

## 2024-06-05 RX ORDER — DONEPEZIL HYDROCHLORIDE 5 MG/1
TABLET, FILM COATED ORAL
Refills: 0 | OUTPATIENT
Start: 2024-06-05

## 2024-06-05 RX ORDER — IPRATROPIUM BROMIDE 42 UG/1
SPRAY, METERED NASAL
Qty: 45 ML | Refills: 7 | Status: SHIPPED | OUTPATIENT
Start: 2024-06-05

## 2024-06-05 RX ORDER — TOLTERODINE 4 MG/1
4 CAPSULE, EXTENDED RELEASE ORAL DAILY
Qty: 90 CAPSULE | Refills: 3 | Status: SHIPPED | OUTPATIENT
Start: 2024-06-05

## 2024-07-02 ENCOUNTER — OFFICE VISIT (OUTPATIENT)
Age: 89
End: 2024-07-02
Payer: MEDICARE

## 2024-07-02 VITALS
DIASTOLIC BLOOD PRESSURE: 69 MMHG | SYSTOLIC BLOOD PRESSURE: 111 MMHG | BODY MASS INDEX: 24.01 KG/M2 | HEART RATE: 60 BPM | TEMPERATURE: 97.7 F | WEIGHT: 153 LBS | RESPIRATION RATE: 16 BRPM | OXYGEN SATURATION: 97 % | HEIGHT: 67 IN

## 2024-07-02 DIAGNOSIS — I48.0 PAF (PAROXYSMAL ATRIAL FIBRILLATION) (HCC): ICD-10-CM

## 2024-07-02 DIAGNOSIS — F32.1 MAJOR DEPRESSIVE DISORDER, SINGLE EPISODE, MODERATE (HCC): ICD-10-CM

## 2024-07-02 DIAGNOSIS — J84.10 PULMONARY FIBROSIS (HCC): ICD-10-CM

## 2024-07-02 DIAGNOSIS — R63.4 WEIGHT LOSS: ICD-10-CM

## 2024-07-02 DIAGNOSIS — Z95.2 S/P TAVR (TRANSCATHETER AORTIC VALVE REPLACEMENT): ICD-10-CM

## 2024-07-02 DIAGNOSIS — R53.83 OTHER FATIGUE: ICD-10-CM

## 2024-07-02 DIAGNOSIS — R53.81 PHYSICAL DECONDITIONING: ICD-10-CM

## 2024-07-02 DIAGNOSIS — G25.0 ESSENTIAL TREMOR: Primary | ICD-10-CM

## 2024-07-02 LAB
ALBUMIN SERPL-MCNC: 3.7 G/DL (ref 3.5–5)
ALBUMIN/GLOB SERPL: 1.1 (ref 1.1–2.2)
ALP SERPL-CCNC: 130 U/L (ref 45–117)
ALT SERPL-CCNC: 29 U/L (ref 12–78)
ANION GAP SERPL CALC-SCNC: 4 MMOL/L (ref 5–15)
AST SERPL-CCNC: 26 U/L (ref 15–37)
BASOPHILS # BLD: 0 K/UL (ref 0–0.1)
BASOPHILS NFR BLD: 1 % (ref 0–1)
BILIRUB SERPL-MCNC: 0.3 MG/DL (ref 0.2–1)
BUN SERPL-MCNC: 17 MG/DL (ref 6–20)
BUN/CREAT SERPL: 21 (ref 12–20)
CALCIUM SERPL-MCNC: 9 MG/DL (ref 8.5–10.1)
CHLORIDE SERPL-SCNC: 102 MMOL/L (ref 97–108)
CO2 SERPL-SCNC: 34 MMOL/L (ref 21–32)
CREAT SERPL-MCNC: 0.81 MG/DL (ref 0.7–1.3)
DIFFERENTIAL METHOD BLD: ABNORMAL
EOSINOPHIL # BLD: 0.3 K/UL (ref 0–0.4)
EOSINOPHIL NFR BLD: 6 % (ref 0–7)
ERYTHROCYTE [DISTWIDTH] IN BLOOD BY AUTOMATED COUNT: 17 % (ref 11.5–14.5)
FERRITIN SERPL-MCNC: 19 NG/ML (ref 26–388)
GLOBULIN SER CALC-MCNC: 3.4 G/DL (ref 2–4)
GLUCOSE SERPL-MCNC: 98 MG/DL (ref 65–100)
HCT VFR BLD AUTO: 47.2 % (ref 36.6–50.3)
HGB BLD-MCNC: 14.8 G/DL (ref 12.1–17)
IMM GRANULOCYTES # BLD AUTO: 0 K/UL
IMM GRANULOCYTES NFR BLD AUTO: 0 %
IRON SATN MFR SERPL: 23 % (ref 20–50)
IRON SERPL-MCNC: 67 UG/DL (ref 35–150)
LYMPHOCYTES # BLD: 1.5 K/UL (ref 0.8–3.5)
LYMPHOCYTES NFR BLD: 33 % (ref 12–49)
MCH RBC QN AUTO: 29.3 PG (ref 26–34)
MCHC RBC AUTO-ENTMCNC: 31.4 G/DL (ref 30–36.5)
MCV RBC AUTO: 93.5 FL (ref 80–99)
MONOCYTES # BLD: 0.4 K/UL (ref 0–1)
MONOCYTES NFR BLD: 9 % (ref 5–13)
NEUTS SEG # BLD: 2.4 K/UL (ref 1.8–8)
NEUTS SEG NFR BLD: 51 % (ref 32–75)
NRBC # BLD: 0 K/UL (ref 0–0.01)
NRBC BLD-RTO: 0 PER 100 WBC
PLATELET # BLD AUTO: 111 K/UL (ref 150–400)
PMV BLD AUTO: 11.1 FL (ref 8.9–12.9)
POTASSIUM SERPL-SCNC: 4.4 MMOL/L (ref 3.5–5.1)
PROT SERPL-MCNC: 7.1 G/DL (ref 6.4–8.2)
RBC # BLD AUTO: 5.05 M/UL (ref 4.1–5.7)
RBC MORPH BLD: ABNORMAL
SODIUM SERPL-SCNC: 140 MMOL/L (ref 136–145)
TIBC SERPL-MCNC: 286 UG/DL (ref 250–450)
WBC # BLD AUTO: 4.6 K/UL (ref 4.1–11.1)
WBC MORPH BLD: ABNORMAL

## 2024-07-02 PROCEDURE — 99214 OFFICE O/P EST MOD 30 MIN: CPT | Performed by: INTERNAL MEDICINE

## 2024-07-02 PROCEDURE — G8428 CUR MEDS NOT DOCUMENT: HCPCS | Performed by: INTERNAL MEDICINE

## 2024-07-02 PROCEDURE — 1123F ACP DISCUSS/DSCN MKR DOCD: CPT | Performed by: INTERNAL MEDICINE

## 2024-07-02 PROCEDURE — 1036F TOBACCO NON-USER: CPT | Performed by: INTERNAL MEDICINE

## 2024-07-02 PROCEDURE — G8420 CALC BMI NORM PARAMETERS: HCPCS | Performed by: INTERNAL MEDICINE

## 2024-07-02 ASSESSMENT — ENCOUNTER SYMPTOMS
SHORTNESS OF BREATH: 0
CHOKING: 0
DIARRHEA: 0
ABDOMINAL PAIN: 0
WHEEZING: 0
CONSTIPATION: 0
BACK PAIN: 0

## 2024-07-02 ASSESSMENT — PATIENT HEALTH QUESTIONNAIRE - PHQ9
SUM OF ALL RESPONSES TO PHQ QUESTIONS 1-9: 0
SUM OF ALL RESPONSES TO PHQ9 QUESTIONS 1 & 2: 0
7. TROUBLE CONCENTRATING ON THINGS, SUCH AS READING THE NEWSPAPER OR WATCHING TELEVISION: NOT AT ALL
SUM OF ALL RESPONSES TO PHQ QUESTIONS 1-9: 0
1. LITTLE INTEREST OR PLEASURE IN DOING THINGS: NOT AT ALL
4. FEELING TIRED OR HAVING LITTLE ENERGY: NOT AT ALL
5. POOR APPETITE OR OVEREATING: NOT AT ALL
6. FEELING BAD ABOUT YOURSELF - OR THAT YOU ARE A FAILURE OR HAVE LET YOURSELF OR YOUR FAMILY DOWN: NOT AT ALL
SUM OF ALL RESPONSES TO PHQ QUESTIONS 1-9: 0
9. THOUGHTS THAT YOU WOULD BE BETTER OFF DEAD, OR OF HURTING YOURSELF: NOT AT ALL
SUM OF ALL RESPONSES TO PHQ QUESTIONS 1-9: 0
10. IF YOU CHECKED OFF ANY PROBLEMS, HOW DIFFICULT HAVE THESE PROBLEMS MADE IT FOR YOU TO DO YOUR WORK, TAKE CARE OF THINGS AT HOME, OR GET ALONG WITH OTHER PEOPLE: NOT DIFFICULT AT ALL
3. TROUBLE FALLING OR STAYING ASLEEP: NOT AT ALL
2. FEELING DOWN, DEPRESSED OR HOPELESS: NOT AT ALL
8. MOVING OR SPEAKING SO SLOWLY THAT OTHER PEOPLE COULD HAVE NOTICED. OR THE OPPOSITE, BEING SO FIGETY OR RESTLESS THAT YOU HAVE BEEN MOVING AROUND A LOT MORE THAN USUAL: NOT AT ALL

## 2024-07-02 NOTE — PROGRESS NOTES
Alcohol use: Not Currently      Family History   Problem Relation Age of Onset    Migraines Mother             Prostate Cancer Brother     Cancer Brother             Gall Bladder Disease Brother     Diabetes Maternal Grandfather     Lung Cancer Father         smoker    Heart Disease Father     Cancer Father             Diabetes Brother     Heart Disease Brother         Allergies   Allergen Reactions    Penicillins Hives        Assessment/Plan  Henri was seen today for hyperlipidemia.    Diagnoses and all orders for this visit:    Essential tremor-I am concerned that medication may be causing more fatigue which is leading to other lifestyle changes including decreased physical conditioning.  Will ask for [facility] to evaluate him at home and work on strengthening and safety at home.  Patient to be cognizant of weight loss and try to increase his p.o. intake.    Major depressive disorder, single episode, moderate (HCC)-reports that mood has been lower but denies any lashon depression.  -     CBC with Auto Differential; Future  -     Comprehensive Metabolic Panel; Future    Pulmonary fibrosis (HCC)-denies any new pulmonary symptoms.  CT scans have been stable  -     CBC with Auto Differential; Future  -     Comprehensive Metabolic Panel; Future  -     Ferritin; Future  -     Iron and TIBC; Future    Weight loss-see above, I am concerned about this.  Will see him back in 3 months.    Other fatigue  -     External Referral To Physical Therapy    Physical deconditioning  -     External Referral To Physical Therapy    PAF (paroxysmal atrial fibrillation) (HCC)-denies any new cardiac symptoms. Echo scheduled.   -     CBC with Auto Differential; Future  -     Comprehensive Metabolic Panel; Future  -     Ferritin; Future  -     Iron and TIBC; Future    S/P TAVR (transcatheter aortic valve replacement)        No follow-up provider specified.    Feliberto Cooley MD  2024    This note was created with

## 2024-07-30 ENCOUNTER — ANCILLARY PROCEDURE (OUTPATIENT)
Age: 89
End: 2024-07-30
Payer: MEDICARE

## 2024-07-30 VITALS
HEIGHT: 67 IN | HEART RATE: 64 BPM | SYSTOLIC BLOOD PRESSURE: 122 MMHG | BODY MASS INDEX: 24.01 KG/M2 | DIASTOLIC BLOOD PRESSURE: 64 MMHG | WEIGHT: 153 LBS

## 2024-07-30 DIAGNOSIS — I48.0 PAROXYSMAL ATRIAL FIBRILLATION (HCC): ICD-10-CM

## 2024-07-30 DIAGNOSIS — I48.0 PAF (PAROXYSMAL ATRIAL FIBRILLATION) (HCC): ICD-10-CM

## 2024-07-30 DIAGNOSIS — I25.10 ATHEROSCLEROSIS OF NATIVE CORONARY ARTERY OF NATIVE HEART WITHOUT ANGINA PECTORIS: ICD-10-CM

## 2024-07-30 DIAGNOSIS — Z95.2 S/P TAVR (TRANSCATHETER AORTIC VALVE REPLACEMENT): ICD-10-CM

## 2024-07-30 PROCEDURE — 93306 TTE W/DOPPLER COMPLETE: CPT | Performed by: INTERNAL MEDICINE

## 2024-08-03 LAB
ECHO AO ASC DIAM: 3.5 CM
ECHO AO ASCENDING AORTA INDEX: 1.94 CM/M2
ECHO AV AREA PEAK VELOCITY: 2.4 CM2
ECHO AV AREA VTI: 2.9 CM2
ECHO AV AREA/BSA PEAK VELOCITY: 1.3 CM2/M2
ECHO AV AREA/BSA VTI: 1.6 CM2/M2
ECHO AV MEAN GRADIENT: 12 MMHG
ECHO AV MEAN VELOCITY: 1.6 M/S
ECHO AV PEAK GRADIENT: 23 MMHG
ECHO AV PEAK VELOCITY: 2.4 M/S
ECHO AV VELOCITY RATIO: 0.54
ECHO AV VTI: 48.4 CM
ECHO BSA: 1.81 M2
ECHO EST RA PRESSURE: 3 MMHG
ECHO LA VOL A-L A2C: 59 ML (ref 18–58)
ECHO LA VOL A-L A4C: 76 ML (ref 18–58)
ECHO LA VOL MOD A2C: 55 ML (ref 18–58)
ECHO LA VOL MOD A4C: 73 ML (ref 18–58)
ECHO LA VOLUME AREA LENGTH: 70 ML
ECHO LA VOLUME INDEX A-L A2C: 33 ML/M2 (ref 16–34)
ECHO LA VOLUME INDEX A-L A4C: 42 ML/M2 (ref 16–34)
ECHO LA VOLUME INDEX AREA LENGTH: 39 ML/M2 (ref 16–34)
ECHO LA VOLUME INDEX MOD A2C: 31 ML/M2 (ref 16–34)
ECHO LA VOLUME INDEX MOD A4C: 41 ML/M2 (ref 16–34)
ECHO LV E' LATERAL VELOCITY: 6 CM/S
ECHO LV E' SEPTAL VELOCITY: 3 CM/S
ECHO LV EDV A2C: 112 ML
ECHO LV EDV A4C: 113 ML
ECHO LV EDV BP: 112 ML (ref 67–155)
ECHO LV EDV INDEX A4C: 63 ML/M2
ECHO LV EDV INDEX BP: 62 ML/M2
ECHO LV EDV NDEX A2C: 62 ML/M2
ECHO LV EJECTION FRACTION A2C: 61 %
ECHO LV EJECTION FRACTION A4C: 68 %
ECHO LV EJECTION FRACTION BIPLANE: 64 % (ref 55–100)
ECHO LV ESV A2C: 43 ML
ECHO LV ESV A4C: 37 ML
ECHO LV ESV BP: 40 ML (ref 22–58)
ECHO LV ESV INDEX A2C: 24 ML/M2
ECHO LV ESV INDEX A4C: 21 ML/M2
ECHO LV ESV INDEX BP: 22 ML/M2
ECHO LV FRACTIONAL SHORTENING: 40 % (ref 28–44)
ECHO LV INTERNAL DIMENSION DIASTOLE INDEX: 2.61 CM/M2
ECHO LV INTERNAL DIMENSION DIASTOLIC: 4.7 CM (ref 4.2–5.9)
ECHO LV INTERNAL DIMENSION SYSTOLIC INDEX: 1.56 CM/M2
ECHO LV INTERNAL DIMENSION SYSTOLIC: 2.8 CM
ECHO LV IVSD: 0.8 CM (ref 0.6–1)
ECHO LV MASS 2D: 122.3 G (ref 88–224)
ECHO LV MASS INDEX 2D: 67.9 G/M2 (ref 49–115)
ECHO LV POSTERIOR WALL DIASTOLIC: 0.8 CM (ref 0.6–1)
ECHO LV RELATIVE WALL THICKNESS RATIO: 0.34
ECHO LVOT AREA: 4.5 CM2
ECHO LVOT AV VTI INDEX: 0.64
ECHO LVOT DIAM: 2.4 CM
ECHO LVOT MEAN GRADIENT: 4 MMHG
ECHO LVOT PEAK GRADIENT: 7 MMHG
ECHO LVOT PEAK VELOCITY: 1.3 M/S
ECHO LVOT STROKE VOLUME INDEX: 77.6 ML/M2
ECHO LVOT SV: 139.7 ML
ECHO LVOT VTI: 30.9 CM
ECHO MV A VELOCITY: 0.93 M/S
ECHO MV AREA PHT: 2.8 CM2
ECHO MV AREA VTI: 6.3 CM2
ECHO MV E DECELERATION TIME (DT): 271.2 MS
ECHO MV E VELOCITY: 0.55 M/S
ECHO MV E/A RATIO: 0.59
ECHO MV E/E' LATERAL: 9.17
ECHO MV E/E' RATIO (AVERAGED): 13.75
ECHO MV E/E' SEPTAL: 18.33
ECHO MV LVOT VTI INDEX: 0.72
ECHO MV MAX VELOCITY: 0.7 M/S
ECHO MV MEAN GRADIENT: 1 MMHG
ECHO MV MEAN VELOCITY: 0.4 M/S
ECHO MV PEAK GRADIENT: 2 MMHG
ECHO MV PRESSURE HALF TIME (PHT): 78.6 MS
ECHO MV VTI: 22.1 CM
ECHO RA AREA 4C: 16.7 CM2
ECHO RA END SYSTOLIC VOLUME APICAL 4 CHAMBER INDEX BSA: 22 ML/M2
ECHO RA VOLUME: 40 ML
ECHO RIGHT VENTRICULAR SYSTOLIC PRESSURE (RVSP): 47 MMHG
ECHO RV INTERNAL DIMENSION: 4 CM
ECHO RV TAPSE: 1.7 CM (ref 1.7–?)
ECHO TV REGURGITANT MAX VELOCITY: 3.33 M/S
ECHO TV REGURGITANT PEAK GRADIENT: 44 MMHG

## 2024-08-03 PROCEDURE — 93306 TTE W/DOPPLER COMPLETE: CPT | Performed by: INTERNAL MEDICINE

## 2024-08-29 RX ORDER — ATORVASTATIN CALCIUM 40 MG/1
TABLET, FILM COATED ORAL
Qty: 90 TABLET | Refills: 3 | Status: SHIPPED | OUTPATIENT
Start: 2024-08-29

## 2024-10-02 ENCOUNTER — OFFICE VISIT (OUTPATIENT)
Age: 89
End: 2024-10-02
Payer: MEDICARE

## 2024-10-02 VITALS
RESPIRATION RATE: 16 BRPM | TEMPERATURE: 97.5 F | DIASTOLIC BLOOD PRESSURE: 77 MMHG | BODY MASS INDEX: 23.86 KG/M2 | OXYGEN SATURATION: 94 % | WEIGHT: 152 LBS | SYSTOLIC BLOOD PRESSURE: 128 MMHG | HEIGHT: 67 IN | HEART RATE: 72 BPM

## 2024-10-02 DIAGNOSIS — I48.0 PAF (PAROXYSMAL ATRIAL FIBRILLATION) (HCC): ICD-10-CM

## 2024-10-02 DIAGNOSIS — J84.10 PULMONARY FIBROSIS (HCC): ICD-10-CM

## 2024-10-02 DIAGNOSIS — G25.0 ESSENTIAL TREMOR: ICD-10-CM

## 2024-10-02 DIAGNOSIS — R53.81 PHYSICAL DECONDITIONING: Primary | ICD-10-CM

## 2024-10-02 DIAGNOSIS — J30.9 ALLERGIC RHINITIS, UNSPECIFIED SEASONALITY, UNSPECIFIED TRIGGER: ICD-10-CM

## 2024-10-02 DIAGNOSIS — Z95.2 S/P TAVR (TRANSCATHETER AORTIC VALVE REPLACEMENT): ICD-10-CM

## 2024-10-02 DIAGNOSIS — R63.4 WEIGHT LOSS: ICD-10-CM

## 2024-10-02 DIAGNOSIS — E78.2 MIXED HYPERLIPIDEMIA: ICD-10-CM

## 2024-10-02 PROCEDURE — G8484 FLU IMMUNIZE NO ADMIN: HCPCS | Performed by: INTERNAL MEDICINE

## 2024-10-02 PROCEDURE — G8428 CUR MEDS NOT DOCUMENT: HCPCS | Performed by: INTERNAL MEDICINE

## 2024-10-02 PROCEDURE — 1123F ACP DISCUSS/DSCN MKR DOCD: CPT | Performed by: INTERNAL MEDICINE

## 2024-10-02 PROCEDURE — G8420 CALC BMI NORM PARAMETERS: HCPCS | Performed by: INTERNAL MEDICINE

## 2024-10-02 PROCEDURE — 99214 OFFICE O/P EST MOD 30 MIN: CPT | Performed by: INTERNAL MEDICINE

## 2024-10-02 PROCEDURE — 1036F TOBACCO NON-USER: CPT | Performed by: INTERNAL MEDICINE

## 2024-10-02 RX ORDER — FEXOFENADINE HCL 180 MG/1
180 TABLET ORAL DAILY
COMMUNITY

## 2024-10-02 SDOH — ECONOMIC STABILITY: FOOD INSECURITY: WITHIN THE PAST 12 MONTHS, YOU WORRIED THAT YOUR FOOD WOULD RUN OUT BEFORE YOU GOT MONEY TO BUY MORE.: NEVER TRUE

## 2024-10-02 SDOH — ECONOMIC STABILITY: FOOD INSECURITY: WITHIN THE PAST 12 MONTHS, THE FOOD YOU BOUGHT JUST DIDN'T LAST AND YOU DIDN'T HAVE MONEY TO GET MORE.: NEVER TRUE

## 2024-10-02 SDOH — ECONOMIC STABILITY: INCOME INSECURITY: HOW HARD IS IT FOR YOU TO PAY FOR THE VERY BASICS LIKE FOOD, HOUSING, MEDICAL CARE, AND HEATING?: NOT HARD AT ALL

## 2024-10-02 ASSESSMENT — ENCOUNTER SYMPTOMS
SHORTNESS OF BREATH: 1
COUGH: 1
ABDOMINAL PAIN: 0
WHEEZING: 0
CONSTIPATION: 0
DIARRHEA: 0
SORE THROAT: 1
BACK PAIN: 0

## 2024-10-02 NOTE — PROGRESS NOTES
Henri Salmon is a 89 y.o. male who presents today for Weight Loss (3 mo f/u ;)  .      He has a history of   Patient Active Problem List   Diagnosis    CAD (coronary atherosclerotic disease)    OAB (overactive bladder)    Essential tremor    S/P TAVR (transcatheter aortic valve replacement)    PAF (paroxysmal atrial fibrillation) (HCC)    Hyperlipidemia    History of melanoma    Coronary artery disease involving native heart without angina pectoris, unspecified vessel or lesion type    AS (aortic stenosis)    Gastroesophageal reflux disease    Aortic valve stenosis    Murmur    MCI (mild cognitive impairment)    Pulmonary fibrosis (HCC)    Thrombocytopenia (HCC)    Major depressive disorder, single episode, moderate (HCC)    Alzheimer's disease, unspecified    Secondary hypercoagulable state (HCC)   .    Today patient is here for follow-up.   he does have other concerns.    Weight loss: We were concerned at last visit given the amount of weight loss that he had had.  Denied any obvious reasons for his weight loss.  Since his weight has maintained. He is working on diet to keep weight up. Working with [employer] at home. No longer needing a walker. Feeling stronger.     Memory changes: Did have some concerns over memory changes at last visit.  He is seeing neurology.  We did back off the primidone dose ring since he reports feeling better overall.  Tremor not any worse.   They do have him on Aricept which was started early 2024. Seeing neurology in late Oct.     Remains on statin. Repeat lipids today.     Slight sore throat recently. Persistent cough. Seeing Pulm tomorrow.     ROS  Review of Systems   Constitutional:  Negative for activity change, appetite change and fever.   HENT:  Positive for congestion and sore throat.    Respiratory:  Positive for cough and shortness of breath. Negative for wheezing.    Cardiovascular:  Negative for chest pain.   Gastrointestinal:  Negative for abdominal pain, constipation and

## 2024-10-03 LAB
ALBUMIN SERPL-MCNC: 3.6 G/DL (ref 3.5–5)
ALBUMIN/GLOB SERPL: 1.1 (ref 1.1–2.2)
ALP SERPL-CCNC: 151 U/L (ref 45–117)
ALT SERPL-CCNC: 23 U/L (ref 12–78)
ANION GAP SERPL CALC-SCNC: 4 MMOL/L (ref 2–12)
AST SERPL-CCNC: 24 U/L (ref 15–37)
BASOPHILS # BLD: 0 K/UL (ref 0–0.1)
BASOPHILS NFR BLD: 1 % (ref 0–1)
BILIRUB SERPL-MCNC: 0.3 MG/DL (ref 0.2–1)
BUN SERPL-MCNC: 15 MG/DL (ref 6–20)
BUN/CREAT SERPL: 19 (ref 12–20)
CALCIUM SERPL-MCNC: 8.8 MG/DL (ref 8.5–10.1)
CHLORIDE SERPL-SCNC: 105 MMOL/L (ref 97–108)
CHOLEST SERPL-MCNC: 131 MG/DL
CO2 SERPL-SCNC: 30 MMOL/L (ref 21–32)
CREAT SERPL-MCNC: 0.8 MG/DL (ref 0.7–1.3)
DIFFERENTIAL METHOD BLD: ABNORMAL
EOSINOPHIL # BLD: 0.2 K/UL (ref 0–0.4)
EOSINOPHIL NFR BLD: 3 % (ref 0–7)
ERYTHROCYTE [DISTWIDTH] IN BLOOD BY AUTOMATED COUNT: 15.3 % (ref 11.5–14.5)
GLOBULIN SER CALC-MCNC: 3.4 G/DL (ref 2–4)
GLUCOSE SERPL-MCNC: 97 MG/DL (ref 65–100)
HCT VFR BLD AUTO: 48.5 % (ref 36.6–50.3)
HDLC SERPL-MCNC: 53 MG/DL
HDLC SERPL: 2.5 (ref 0–5)
HGB BLD-MCNC: 15.2 G/DL (ref 12.1–17)
IMM GRANULOCYTES # BLD AUTO: 0 K/UL (ref 0–0.04)
IMM GRANULOCYTES NFR BLD AUTO: 0 % (ref 0–0.5)
LDLC SERPL CALC-MCNC: 64.8 MG/DL (ref 0–100)
LYMPHOCYTES # BLD: 2.1 K/UL (ref 0.8–3.5)
LYMPHOCYTES NFR BLD: 31 % (ref 12–49)
MCH RBC QN AUTO: 29.4 PG (ref 26–34)
MCHC RBC AUTO-ENTMCNC: 31.3 G/DL (ref 30–36.5)
MCV RBC AUTO: 93.8 FL (ref 80–99)
MONOCYTES # BLD: 0.6 K/UL (ref 0–1)
MONOCYTES NFR BLD: 9 % (ref 5–13)
NEUTS SEG # BLD: 3.8 K/UL (ref 1.8–8)
NEUTS SEG NFR BLD: 55 % (ref 32–75)
NRBC # BLD: 0 K/UL (ref 0–0.01)
NRBC BLD-RTO: 0 PER 100 WBC
PLATELET # BLD AUTO: 116 K/UL (ref 150–400)
PMV BLD AUTO: 11.5 FL (ref 8.9–12.9)
POTASSIUM SERPL-SCNC: 4.3 MMOL/L (ref 3.5–5.1)
PROT SERPL-MCNC: 7 G/DL (ref 6.4–8.2)
RBC # BLD AUTO: 5.17 M/UL (ref 4.1–5.7)
SODIUM SERPL-SCNC: 139 MMOL/L (ref 136–145)
TRIGL SERPL-MCNC: 66 MG/DL
VLDLC SERPL CALC-MCNC: 13.2 MG/DL
WBC # BLD AUTO: 6.8 K/UL (ref 4.1–11.1)

## 2024-10-15 ENCOUNTER — TELEPHONE (OUTPATIENT)
Age: 89
End: 2024-10-15

## 2024-10-15 NOTE — TELEPHONE ENCOUNTER
Patient would like to cancel all three appointments he has coming up with no reschedules.    2 appointments on 04/09/25 and 1on  04/23/25.

## 2024-10-28 ENCOUNTER — TELEPHONE (OUTPATIENT)
Age: 89
End: 2024-10-28

## 2024-10-28 RX ORDER — OMEPRAZOLE 40 MG/1
40 CAPSULE, DELAYED RELEASE ORAL DAILY
Qty: 90 CAPSULE | Refills: 2 | Status: SHIPPED | OUTPATIENT
Start: 2024-10-28

## 2024-10-28 RX ORDER — IPRATROPIUM BROMIDE 42 UG/1
2 SPRAY, METERED NASAL 3 TIMES DAILY
Qty: 45 ML | Refills: 2 | Status: SHIPPED | OUTPATIENT
Start: 2024-10-28

## 2024-10-28 NOTE — TELEPHONE ENCOUNTER
10/28/2024--Sent in to [facility] for 90 days.

## 2024-10-28 NOTE — TELEPHONE ENCOUNTER
Medication refill for     omeprazole (PRILOSEC) 40 MG delayed release capsule     ipratropium (ATROVENT) 0.06 % nasal spray    Send to  [id number] [address] - P [phone] - F [phone] [[id number]]

## 2024-10-30 ENCOUNTER — OFFICE VISIT (OUTPATIENT)
Age: 89
End: 2024-10-30
Payer: MEDICARE

## 2024-10-30 VITALS
RESPIRATION RATE: 22 BRPM | HEART RATE: 65 BPM | SYSTOLIC BLOOD PRESSURE: 102 MMHG | OXYGEN SATURATION: 95 % | DIASTOLIC BLOOD PRESSURE: 68 MMHG

## 2024-10-30 DIAGNOSIS — R41.3 MEMORY CHANGES: Primary | ICD-10-CM

## 2024-10-30 DIAGNOSIS — G25.0 ESSENTIAL TREMOR: ICD-10-CM

## 2024-10-30 PROCEDURE — 1036F TOBACCO NON-USER: CPT

## 2024-10-30 PROCEDURE — G8427 DOCREV CUR MEDS BY ELIG CLIN: HCPCS

## 2024-10-30 PROCEDURE — 1123F ACP DISCUSS/DSCN MKR DOCD: CPT

## 2024-10-30 PROCEDURE — G8484 FLU IMMUNIZE NO ADMIN: HCPCS

## 2024-10-30 PROCEDURE — 1159F MED LIST DOCD IN RCRD: CPT

## 2024-10-30 PROCEDURE — 1160F RVW MEDS BY RX/DR IN RCRD: CPT

## 2024-10-30 PROCEDURE — G8420 CALC BMI NORM PARAMETERS: HCPCS

## 2024-10-30 PROCEDURE — 1126F AMNT PAIN NOTED NONE PRSNT: CPT

## 2024-10-30 PROCEDURE — 99214 OFFICE O/P EST MOD 30 MIN: CPT

## 2024-10-30 NOTE — PROGRESS NOTES
Memory- according to him he has been doing okay as far as memory  Tremors- under control with primidone     
107).  Digit Symbol Substitution: Patient tested in the 41st percentile (scaled standard score of 96).  Stroop: Patient tested in the 2nd percentile (scaled standard score of 70).  Immediate Recognition: Patient tested in the 39th percentile (scaled standard score of 96).  Delayed Recognition: Patient tested in the 12th percentile (scaled standard score of 82).      Interpretation of Test Scores:      Examination of individual component tests shows:   Attention - Trails A: unlikely impairment  Mental Flexibility - Trails B: unlikely impairment  Executive Function - Digit Symbol Substitution: unlikely impairment  Executive Function - Stroop: possible impairment  Memory - Immediate Recognition: unlikely impairment  Memory - Delayed Recognition: possible impairment     The patient’s overall cognitive test performance was in the 20th percentile when compared to individuals of a similar age and gender, suggesting possible presence of cognitive impairment.   20388 (16 minute minimum)  16__ minutes were spent administering cognitive testing by medical assistant/nurse.      89107 (31 minute minimum)   31 minutes were spent reviewing and interpreting the cognitive testing results, integrating patient data, and discussing the results with the patient.    Continue taking Aricept 10 mg nightly to try to slow memory loss progression  Start doing Mysoline 250 mg 2 tabs in the morning and 2 tabs in the evening time for essential tremor.  Based on brain check results today, suggest that he have neuropsych testing due to results of possible presence of cognitive impairment.  Patient agreed to testing and gave her referral to Dr. Marin as he does not want to travel to another office to do the testing.      Today's office visit note  HPI  Patient is here today with his wife for essential tremor and memory follow-up since the patient was here last he did start taking Aricept in the customary fashion to 10 mg every evening for

## 2024-11-12 ENCOUNTER — TELEPHONE (OUTPATIENT)
Age: 89
End: 2024-11-12

## 2024-11-12 NOTE — TELEPHONE ENCOUNTER
Patient is requesting for an additional order for at home therapy. Patient was receiving at home therapy but all of his approved visits have been used. Patient feels that additional therapy would beneficial. Please contact the patient to discuss options.

## 2024-11-12 NOTE — TELEPHONE ENCOUNTER
11/12/2024--I called and spoke with the patient and he stated that he has finished PT but that he is still having weakness in is legs and unsteady on his feet and thinks he needs more PT and was told to call if he needs more. Patient was doing PT with [facility]. I let patient know that I would send the order there again and that they should be contacting him. Patient verbalized understanding and had no further question's or concerns at that time.

## 2024-11-26 ENCOUNTER — TELEPHONE (OUTPATIENT)
Age: 88
End: 2024-11-26

## 2024-11-26 DIAGNOSIS — G25.0 ESSENTIAL TREMOR: ICD-10-CM

## 2024-11-26 RX ORDER — PRIMIDONE 250 MG/1
250 TABLET ORAL 2 TIMES DAILY
Qty: 180 TABLET | Refills: 3 | Status: SHIPPED | OUTPATIENT
Start: 2024-11-26

## 2025-01-13 RX ORDER — APIXABAN 5 MG/1
5 TABLET, FILM COATED ORAL 2 TIMES DAILY
Qty: 180 TABLET | Refills: 3 | Status: SHIPPED | OUTPATIENT
Start: 2025-01-13

## 2025-03-06 DIAGNOSIS — R41.3 MEMORY CHANGES: ICD-10-CM

## 2025-03-11 RX ORDER — DONEPEZIL HYDROCHLORIDE 10 MG/1
10 TABLET, FILM COATED ORAL NIGHTLY
Qty: 90 TABLET | Refills: 0 | Status: SHIPPED | OUTPATIENT
Start: 2025-03-11

## 2025-04-07 ENCOUNTER — TELEPHONE (OUTPATIENT)
Facility: CLINIC | Age: 89
End: 2025-04-07

## 2025-04-07 SDOH — ECONOMIC STABILITY: FOOD INSECURITY: WITHIN THE PAST 12 MONTHS, THE FOOD YOU BOUGHT JUST DIDN'T LAST AND YOU DIDN'T HAVE MONEY TO GET MORE.: NEVER TRUE

## 2025-04-07 SDOH — HEALTH STABILITY: PHYSICAL HEALTH: ON AVERAGE, HOW MANY DAYS PER WEEK DO YOU ENGAGE IN MODERATE TO STRENUOUS EXERCISE (LIKE A BRISK WALK)?: 0 DAYS

## 2025-04-07 SDOH — ECONOMIC STABILITY: FOOD INSECURITY: WITHIN THE PAST 12 MONTHS, YOU WORRIED THAT YOUR FOOD WOULD RUN OUT BEFORE YOU GOT MONEY TO BUY MORE.: NEVER TRUE

## 2025-04-07 SDOH — ECONOMIC STABILITY: INCOME INSECURITY: IN THE LAST 12 MONTHS, WAS THERE A TIME WHEN YOU WERE NOT ABLE TO PAY THE MORTGAGE OR RENT ON TIME?: NO

## 2025-04-07 SDOH — HEALTH STABILITY: PHYSICAL HEALTH: ON AVERAGE, HOW MANY MINUTES DO YOU ENGAGE IN EXERCISE AT THIS LEVEL?: 0 MIN

## 2025-04-07 SDOH — ECONOMIC STABILITY: TRANSPORTATION INSECURITY
IN THE PAST 12 MONTHS, HAS LACK OF TRANSPORTATION KEPT YOU FROM MEETINGS, WORK, OR FROM GETTING THINGS NEEDED FOR DAILY LIVING?: NO

## 2025-04-07 SDOH — ECONOMIC STABILITY: TRANSPORTATION INSECURITY
IN THE PAST 12 MONTHS, HAS THE LACK OF TRANSPORTATION KEPT YOU FROM MEDICAL APPOINTMENTS OR FROM GETTING MEDICATIONS?: NO

## 2025-04-07 ASSESSMENT — LIFESTYLE VARIABLES
HOW MANY STANDARD DRINKS CONTAINING ALCOHOL DO YOU HAVE ON A TYPICAL DAY: 0
HOW OFTEN DO YOU HAVE A DRINK CONTAINING ALCOHOL: 1
HOW OFTEN DO YOU HAVE A DRINK CONTAINING ALCOHOL: NEVER
HOW MANY STANDARD DRINKS CONTAINING ALCOHOL DO YOU HAVE ON A TYPICAL DAY: PATIENT DOES NOT DRINK
HOW OFTEN DO YOU HAVE SIX OR MORE DRINKS ON ONE OCCASION: 1

## 2025-04-07 ASSESSMENT — PATIENT HEALTH QUESTIONNAIRE - PHQ9
SUM OF ALL RESPONSES TO PHQ QUESTIONS 1-9: 2
2. FEELING DOWN, DEPRESSED OR HOPELESS: SEVERAL DAYS
SUM OF ALL RESPONSES TO PHQ QUESTIONS 1-9: 2
1. LITTLE INTEREST OR PLEASURE IN DOING THINGS: SEVERAL DAYS
SUM OF ALL RESPONSES TO PHQ QUESTIONS 1-9: 2
SUM OF ALL RESPONSES TO PHQ QUESTIONS 1-9: 2

## 2025-04-08 ENCOUNTER — OFFICE VISIT (OUTPATIENT)
Facility: CLINIC | Age: 89
End: 2025-04-08
Payer: MEDICARE

## 2025-04-08 VITALS
DIASTOLIC BLOOD PRESSURE: 76 MMHG | WEIGHT: 150 LBS | TEMPERATURE: 97.5 F | HEIGHT: 67 IN | OXYGEN SATURATION: 92 % | SYSTOLIC BLOOD PRESSURE: 115 MMHG | BODY MASS INDEX: 23.54 KG/M2 | HEART RATE: 59 BPM | RESPIRATION RATE: 16 BRPM

## 2025-04-08 DIAGNOSIS — E78.2 MIXED HYPERLIPIDEMIA: ICD-10-CM

## 2025-04-08 DIAGNOSIS — R53.83 OTHER FATIGUE: ICD-10-CM

## 2025-04-08 DIAGNOSIS — I48.0 PAF (PAROXYSMAL ATRIAL FIBRILLATION) (HCC): ICD-10-CM

## 2025-04-08 DIAGNOSIS — F32.1 MAJOR DEPRESSIVE DISORDER, SINGLE EPISODE, MODERATE (HCC): ICD-10-CM

## 2025-04-08 DIAGNOSIS — G30.9 ALZHEIMER'S DISEASE, UNSPECIFIED (CODE): ICD-10-CM

## 2025-04-08 DIAGNOSIS — J84.10 PULMONARY FIBROSIS (HCC): ICD-10-CM

## 2025-04-08 DIAGNOSIS — G25.0 ESSENTIAL TREMOR: ICD-10-CM

## 2025-04-08 DIAGNOSIS — Z00.00 MEDICARE ANNUAL WELLNESS VISIT, SUBSEQUENT: Primary | ICD-10-CM

## 2025-04-08 DIAGNOSIS — R53.81 PHYSICAL DECONDITIONING: ICD-10-CM

## 2025-04-08 DIAGNOSIS — Z95.2 S/P TAVR (TRANSCATHETER AORTIC VALVE REPLACEMENT): ICD-10-CM

## 2025-04-08 DIAGNOSIS — J31.0 RHINITIS, UNSPECIFIED TYPE: ICD-10-CM

## 2025-04-08 DIAGNOSIS — I25.10 CORONARY ARTERY DISEASE INVOLVING NATIVE HEART WITHOUT ANGINA PECTORIS, UNSPECIFIED VESSEL OR LESION TYPE: ICD-10-CM

## 2025-04-08 LAB
ALBUMIN SERPL-MCNC: 3.8 G/DL (ref 3.5–5)
ALBUMIN/GLOB SERPL: 1.1 (ref 1.1–2.2)
ALP SERPL-CCNC: 127 U/L (ref 45–117)
ALT SERPL-CCNC: 28 U/L (ref 12–78)
ANION GAP SERPL CALC-SCNC: 3 MMOL/L (ref 2–12)
AST SERPL-CCNC: 25 U/L (ref 15–37)
BASOPHILS # BLD: 0 K/UL (ref 0–0.1)
BASOPHILS NFR BLD: 0 % (ref 0–1)
BILIRUB SERPL-MCNC: 0.6 MG/DL (ref 0.2–1)
BUN SERPL-MCNC: 19 MG/DL (ref 6–20)
BUN/CREAT SERPL: 22 (ref 12–20)
CALCIUM SERPL-MCNC: 9.4 MG/DL (ref 8.5–10.1)
CHLORIDE SERPL-SCNC: 101 MMOL/L (ref 97–108)
CO2 SERPL-SCNC: 33 MMOL/L (ref 21–32)
CREAT SERPL-MCNC: 0.85 MG/DL (ref 0.7–1.3)
DIFFERENTIAL METHOD BLD: ABNORMAL
EOSINOPHIL # BLD: 0.14 K/UL (ref 0–0.4)
EOSINOPHIL NFR BLD: 2 % (ref 0–7)
ERYTHROCYTE [DISTWIDTH] IN BLOOD BY AUTOMATED COUNT: 14.9 % (ref 11.5–14.5)
GLOBULIN SER CALC-MCNC: 3.6 G/DL (ref 2–4)
GLUCOSE SERPL-MCNC: 69 MG/DL (ref 65–100)
HCT VFR BLD AUTO: 50.6 % (ref 36.6–50.3)
HGB BLD-MCNC: 15.9 G/DL (ref 12.1–17)
IMM GRANULOCYTES # BLD AUTO: 0 K/UL
IMM GRANULOCYTES NFR BLD AUTO: 0 %
LYMPHOCYTES # BLD: 2.72 K/UL (ref 0.8–3.5)
LYMPHOCYTES NFR BLD: 40 % (ref 12–49)
MCH RBC QN AUTO: 29.4 PG (ref 26–34)
MCHC RBC AUTO-ENTMCNC: 31.4 G/DL (ref 30–36.5)
MCV RBC AUTO: 93.7 FL (ref 80–99)
MONOCYTES # BLD: 0.41 K/UL (ref 0–1)
MONOCYTES NFR BLD: 6 % (ref 5–13)
NEUTS BAND NFR BLD MANUAL: 1 % (ref 0–6)
NEUTS SEG # BLD: 3.53 K/UL (ref 1.8–8)
NEUTS SEG NFR BLD: 51 % (ref 32–75)
NRBC # BLD: 0 K/UL (ref 0–0.01)
NRBC BLD-RTO: 0 PER 100 WBC
PLATELET # BLD AUTO: 114 K/UL (ref 150–400)
PMV BLD AUTO: 11.9 FL (ref 8.9–12.9)
POTASSIUM SERPL-SCNC: 4.3 MMOL/L (ref 3.5–5.1)
PROT SERPL-MCNC: 7.4 G/DL (ref 6.4–8.2)
RBC # BLD AUTO: 5.4 M/UL (ref 4.1–5.7)
RBC MORPH BLD: ABNORMAL
SODIUM SERPL-SCNC: 137 MMOL/L (ref 136–145)
TSH SERPL DL<=0.05 MIU/L-ACNC: 2.86 UIU/ML (ref 0.36–3.74)
WBC # BLD AUTO: 6.8 K/UL (ref 4.1–11.1)

## 2025-04-08 PROCEDURE — 99213 OFFICE O/P EST LOW 20 MIN: CPT | Performed by: INTERNAL MEDICINE

## 2025-04-08 PROCEDURE — G8420 CALC BMI NORM PARAMETERS: HCPCS | Performed by: INTERNAL MEDICINE

## 2025-04-08 PROCEDURE — 1123F ACP DISCUSS/DSCN MKR DOCD: CPT | Performed by: INTERNAL MEDICINE

## 2025-04-08 PROCEDURE — 1036F TOBACCO NON-USER: CPT | Performed by: INTERNAL MEDICINE

## 2025-04-08 PROCEDURE — G0439 PPPS, SUBSEQ VISIT: HCPCS | Performed by: INTERNAL MEDICINE

## 2025-04-08 PROCEDURE — G8428 CUR MEDS NOT DOCUMENT: HCPCS | Performed by: INTERNAL MEDICINE

## 2025-04-08 PROCEDURE — 1126F AMNT PAIN NOTED NONE PRSNT: CPT | Performed by: INTERNAL MEDICINE

## 2025-04-08 RX ORDER — IPRATROPIUM BROMIDE 42 UG/1
2 SPRAY, METERED NASAL 3 TIMES DAILY
Qty: 45 ML | Refills: 11 | Status: SHIPPED | OUTPATIENT
Start: 2025-04-08

## 2025-04-08 RX ORDER — BUPROPION HYDROCHLORIDE 150 MG/1
150 TABLET ORAL EVERY MORNING
Qty: 30 TABLET | Refills: 3 | Status: SHIPPED | OUTPATIENT
Start: 2025-04-08

## 2025-04-08 ASSESSMENT — ENCOUNTER SYMPTOMS
CONSTIPATION: 0
BACK PAIN: 0
SHORTNESS OF BREATH: 0
DIARRHEA: 0
ABDOMINAL PAIN: 0
WHEEZING: 0

## 2025-04-08 NOTE — PROGRESS NOTES
Yes Automatic Reconciliation, Ar   Multiple Vitamins-Minerals (PRESERVISION/LUTEIN) CAPS Take by mouth Yes Automatic Reconciliation, Ar       CareTeam (Including outside providers/suppliers regularly involved in providing care):   Patient Care Team:  Felibreto Cooley MD as PCP - General  Feliberto Cooley MD as PCP - Empaneled Provider  Tien Brown MD as Physician     Recommendations for Preventive Services Due: see orders and patient instructions/AVS.  Recommended screening schedule for the next 5-10 years is provided to the patient in written form: see Patient Instructions/AVS.     Reviewed and updated this visit:  Tobacco  Allergies  Med Hx  Sexual Hx      Sexual History: Patient declined to answer.

## 2025-04-09 ENCOUNTER — RESULTS FOLLOW-UP (OUTPATIENT)
Facility: CLINIC | Age: 89
End: 2025-04-09

## 2025-04-14 RX ORDER — NIACIN 500 MG/1
500 TABLET, EXTENDED RELEASE ORAL DAILY
Qty: 90 TABLET | Refills: 3 | Status: SHIPPED | OUTPATIENT
Start: 2025-04-14

## 2025-04-29 NOTE — PROGRESS NOTES
Henri Salmon is a 89 y.o. male who presents with the following  Chief Complaint   Patient presents with    Follow-up     Memory, tremors      Last office visit note  HPI  Patient is here today with his wife for essential tremor and memory follow-up since the patient was here last he did start taking Aricept in the customary fashion to 10 mg every evening for short-term memory loss concerns.  He says that he has had an increase in dreams but they are not bothersome.  We had the patient do a brain check that showed a possible cognitive impairment.  We also suggested neuropsych testing which was scheduled but since he was here last they have canceled that appointment and say they are not interested in the testing.  Both the patient and his wife feel like memory is stable since they were here last 5 months ago.  He continues to read books daily to keep his brain active.  Essential tremor patient was taking Mysoline 250 mg 2 pills twice a day and since he was here last his primary care doctor lowered the dose to 2 pills a day and gave him physical therapy for balance gait and strength.  Patient was having physical therapy twice a week for 3 months and says that he felt like it was beneficial.  Essential tremor continues to do well after having focused ultrasound.     Today the patient can tell me his date of birth where he is, the day of the week, the full date of today, that tomorrow is Halloween, the current season, how to calculate coins, the floor that we are on, the current president's name but felt like our last president was Teresa.  Did give him a hint that the name started with T and he did say Trump.    Patient will continue to take Mysoline 250 mg twice a day.  Patient will also continue to take Aricept 10 mg nightly for memory loss.  Patient will follow-up in 6 months      Today's office visit note  HPI  Patient is here today with his wife for essential tremor and memory follow-up.  The patient is taking

## 2025-04-30 ENCOUNTER — OFFICE VISIT (OUTPATIENT)
Age: 89
End: 2025-04-30
Payer: MEDICARE

## 2025-04-30 VITALS
RESPIRATION RATE: 20 BRPM | DIASTOLIC BLOOD PRESSURE: 72 MMHG | OXYGEN SATURATION: 95 % | HEART RATE: 62 BPM | SYSTOLIC BLOOD PRESSURE: 126 MMHG

## 2025-04-30 DIAGNOSIS — G25.0 ESSENTIAL TREMOR: ICD-10-CM

## 2025-04-30 DIAGNOSIS — R41.3 MEMORY CHANGES: Primary | ICD-10-CM

## 2025-04-30 PROCEDURE — 1126F AMNT PAIN NOTED NONE PRSNT: CPT

## 2025-04-30 PROCEDURE — 1159F MED LIST DOCD IN RCRD: CPT

## 2025-04-30 PROCEDURE — 99214 OFFICE O/P EST MOD 30 MIN: CPT

## 2025-04-30 PROCEDURE — 1160F RVW MEDS BY RX/DR IN RCRD: CPT

## 2025-04-30 PROCEDURE — 1036F TOBACCO NON-USER: CPT

## 2025-04-30 PROCEDURE — 1123F ACP DISCUSS/DSCN MKR DOCD: CPT

## 2025-04-30 PROCEDURE — G8420 CALC BMI NORM PARAMETERS: HCPCS

## 2025-04-30 PROCEDURE — G8427 DOCREV CUR MEDS BY ELIG CLIN: HCPCS

## 2025-04-30 RX ORDER — MULTIVIT WITH MINERALS/LUTEIN
1000 TABLET ORAL DAILY
COMMUNITY

## 2025-04-30 NOTE — PROGRESS NOTES
Memory- has been doing pretty well, doesn't think he is any worse   According to his wife he does have some good days and some bad days     Tremors- okay, about the same   His head at times is bad, but his hands are fine

## 2025-05-13 RX ORDER — TOLTERODINE 4 MG/1
4 CAPSULE, EXTENDED RELEASE ORAL DAILY
Qty: 90 CAPSULE | Refills: 3 | Status: SHIPPED | OUTPATIENT
Start: 2025-05-13

## 2025-05-20 ENCOUNTER — OFFICE VISIT (OUTPATIENT)
Facility: CLINIC | Age: 89
End: 2025-05-20
Payer: MEDICARE

## 2025-05-20 VITALS
BODY MASS INDEX: 23.7 KG/M2 | WEIGHT: 151 LBS | OXYGEN SATURATION: 92 % | SYSTOLIC BLOOD PRESSURE: 130 MMHG | HEART RATE: 67 BPM | RESPIRATION RATE: 16 BRPM | DIASTOLIC BLOOD PRESSURE: 80 MMHG | TEMPERATURE: 97.5 F | HEIGHT: 67 IN

## 2025-05-20 DIAGNOSIS — E78.2 MIXED HYPERLIPIDEMIA: ICD-10-CM

## 2025-05-20 DIAGNOSIS — G25.0 ESSENTIAL TREMOR: ICD-10-CM

## 2025-05-20 DIAGNOSIS — F32.1 MAJOR DEPRESSIVE DISORDER, SINGLE EPISODE, MODERATE (HCC): Primary | ICD-10-CM

## 2025-05-20 DIAGNOSIS — I48.0 PAF (PAROXYSMAL ATRIAL FIBRILLATION) (HCC): ICD-10-CM

## 2025-05-20 DIAGNOSIS — G30.9 ALZHEIMER'S DISEASE, UNSPECIFIED (CODE) (HCC): ICD-10-CM

## 2025-05-20 PROCEDURE — 1123F ACP DISCUSS/DSCN MKR DOCD: CPT | Performed by: INTERNAL MEDICINE

## 2025-05-20 PROCEDURE — G8420 CALC BMI NORM PARAMETERS: HCPCS | Performed by: INTERNAL MEDICINE

## 2025-05-20 PROCEDURE — 1126F AMNT PAIN NOTED NONE PRSNT: CPT | Performed by: INTERNAL MEDICINE

## 2025-05-20 PROCEDURE — 99213 OFFICE O/P EST LOW 20 MIN: CPT | Performed by: INTERNAL MEDICINE

## 2025-05-20 PROCEDURE — 1036F TOBACCO NON-USER: CPT | Performed by: INTERNAL MEDICINE

## 2025-05-20 PROCEDURE — G8428 CUR MEDS NOT DOCUMENT: HCPCS | Performed by: INTERNAL MEDICINE

## 2025-05-20 RX ORDER — BUPROPION HYDROCHLORIDE 150 MG/1
150 TABLET ORAL EVERY MORNING
Qty: 90 TABLET | Refills: 3 | Status: SHIPPED | OUTPATIENT
Start: 2025-05-20

## 2025-05-20 ASSESSMENT — ENCOUNTER SYMPTOMS
DIARRHEA: 0
BACK PAIN: 0
CONSTIPATION: 0
ABDOMINAL PAIN: 0
WHEEZING: 0
EYE ITCHING: 0
SHORTNESS OF BREATH: 0
EYE DISCHARGE: 0
EYE REDNESS: 0

## 2025-05-20 NOTE — PROGRESS NOTES
Systems   Constitutional:  Negative for activity change, appetite change and fever.   Eyes:  Negative for discharge, redness and itching.   Respiratory:  Negative for shortness of breath and wheezing.    Cardiovascular:  Negative for chest pain.   Gastrointestinal:  Negative for abdominal pain, constipation and diarrhea.   Musculoskeletal:  Negative for back pain and myalgias.   Neurological:  Positive for tremors. Negative for dizziness and headaches.   Psychiatric/Behavioral:  Negative for agitation, behavioral problems and hallucinations.          Vitals:    05/20/25 1340   BP: 130/80   Pulse: 67   Resp: 16   Temp: 97.5 °F (36.4 °C)   SpO2: 92%       Physical Exam  Constitutional:       Appearance: Normal appearance.   HENT:      Head: Normocephalic and atraumatic.      Nose: Nose normal.   Eyes:      Extraocular Movements: Extraocular movements intact.      Conjunctiva/sclera: Conjunctivae normal.   Cardiovascular:      Rate and Rhythm: Normal rate and regular rhythm.      Pulses: Normal pulses.      Heart sounds: No murmur heard.  Pulmonary:      Effort: Pulmonary effort is normal.      Breath sounds: Normal breath sounds. No wheezing.   Abdominal:      General: There is no distension.      Palpations: Abdomen is soft.   Musculoskeletal:         General: No swelling.      Cervical back: Normal range of motion.   Skin:     General: Skin is warm and dry.   Neurological:      General: No focal deficit present.      Mental Status: He is alert. Mental status is at baseline.      Comments: + tremor   Psychiatric:         Mood and Affect: Mood normal.         Behavior: Behavior normal.           Current Outpatient Medications   Medication Sig    buPROPion (WELLBUTRIN XL) 150 MG extended release tablet Take 1 tablet by mouth every morning    tolterodine (DETROL LA) 4 MG extended release capsule TAKE 1 CAPSULE DAILY    niacin (NIASPAN) 500 MG extended release tablet TAKE 1 TABLET DAILY    Multiple Vitamins-Minerals

## 2025-06-04 DIAGNOSIS — R41.3 MEMORY CHANGES: ICD-10-CM

## 2025-06-04 RX ORDER — DONEPEZIL HYDROCHLORIDE 10 MG/1
10 TABLET, FILM COATED ORAL NIGHTLY
Qty: 90 TABLET | Refills: 3 | Status: SHIPPED | OUTPATIENT
Start: 2025-06-04

## 2025-06-23 ENCOUNTER — TELEPHONE (OUTPATIENT)
Facility: CLINIC | Age: 89
End: 2025-06-23

## 2025-06-23 DIAGNOSIS — J31.0 RHINITIS, UNSPECIFIED TYPE: ICD-10-CM

## 2025-06-23 RX ORDER — IPRATROPIUM BROMIDE 42 UG/1
2 SPRAY, METERED NASAL 3 TIMES DAILY
Qty: 45 ML | Refills: 11 | Status: SHIPPED | OUTPATIENT
Start: 2025-06-23

## 2025-06-23 NOTE — TELEPHONE ENCOUNTER
Medication refill for    ipratropium (ATROVENT) 0.06 % nasal spray     [facility] - [facility], [place] - [address] [place] [address] - P [phone] - F [phone]

## (undated) DEVICE — Device: Brand: PADPRO

## (undated) DEVICE — PACK PROCEDURE SURG HRT CATH

## (undated) DEVICE — TOTAL TRAY, 16FR 10ML SIL FOLEY, URN: Brand: MEDLINE

## (undated) DEVICE — REM POLYHESIVE ADULT PATIENT RETURN ELECTRODE: Brand: VALLEYLAB

## (undated) DEVICE — PROCEDURE KIT FLUID MGMT CUST MAINFOLD STRL

## (undated) DEVICE — KIT HND CTRL 3 W STPCOCK ROT END 54IN PREM HI PRSS TBNG AT

## (undated) DEVICE — TIDISHIELD TRANSPORT, CONTAINMENT COVER FOR BACK TABLE 4'6" (1.37M) TO 8' (2.43M) IN LENGTH: Brand: TIDISHIELD

## (undated) DEVICE — LUER-LOK 360°: Brand: CONNECTA, LUER-LOK

## (undated) DEVICE — ANGIOGRAPHY KIT

## (undated) DEVICE — TR BAND RADIAL ARTERY COMPRESSION DEVICE: Brand: TR BAND

## (undated) DEVICE — CATH ANGI BLLN DIL 3.25X12MM -- NC EUPHORA

## (undated) DEVICE — DRAPE PRB US TRNSDCR 6X96IN --

## (undated) DEVICE — SYRINGE ANGIO 10ML RED FLAT GRP FIX M LUER CONN MEDALLION

## (undated) DEVICE — SENSOR TEMP SKIN DISP

## (undated) DEVICE — FIXED CORE WIRE GUIDE SAFE-T-J, CURVED: Brand: COOK

## (undated) DEVICE — GOWN,SIRUS,FABRNF,XL,20/CS: Brand: MEDLINE

## (undated) DEVICE — STERILE POLYISOPRENE POWDER-FREE SURGICAL GLOVES: Brand: PROTEXIS

## (undated) DEVICE — GDWIRE COR ROTAWIRE 0.09INX325 --

## (undated) DEVICE — INTENDED TO STANDARDIZE OR CAMERAS TO ALLOW FOR THE USE OF THE OR CAMERA COVER: Brand: ASPEN® O.R. CAMERA COVER

## (undated) DEVICE — SPLINT WR POS F/ARTERIAL ACC -- BX/10

## (undated) DEVICE — CATH DIAG D 5F 155 MULTIPK X3 -- IMPULSE 16391-302

## (undated) DEVICE — CATHETER BLLN  SPRINTER 138CM 12MM DIA1.5MM CROSSING PROF

## (undated) DEVICE — SYR MED COLOR CODED 3ML RED -- MEDALLION

## (undated) DEVICE — GLIDESHEATH SLENDER ACCESS KIT: Brand: GLIDESHEATH SLENDER

## (undated) DEVICE — PINNACLE INTRODUCER SHEATH: Brand: PINNACLE

## (undated) DEVICE — KIT MFLD ISOLATN NACL CNTRST PRT TBNG SPIK W/ PRSS TRNSDUC

## (undated) DEVICE — KIT MED IMAG CNTRST AGNT W/ IOPAMIDOL REUSE

## (undated) DEVICE — DRAPE XR C ARM 41X74IN LF --

## (undated) DEVICE — CATH GUID COR EB375 7FR 100CM -- LAUNCHER

## (undated) DEVICE — 1000ML,PRESSURE INFUSER W/STOPCOCK: Brand: MEDLINE

## (undated) DEVICE — STERILE POLYISOPRENE POWDER-FREE SURGICAL GLOVES WITH EMOLLIENT COATING: Brand: PROTEXIS

## (undated) DEVICE — (D)PREP SKN CHLRAPRP APPL 26ML -- CONVERT TO ITEM 371833

## (undated) DEVICE — 6 FOOT DISPOSABLE EXTENSION CABLE WITH SAFE CONNECT / SCREW-DOWN

## (undated) DEVICE — AIRLIFE™  ADULT CUSHION NASAL CANNULA WITH 7 FOOT (2.1 M) CRUSH-RESISTANT OXYGEN TUBING, AND U/CONNECT-IT ADAPTER: Brand: AIRLIFE™

## (undated) DEVICE — GLIDESHEATH SLENDER STAINLESS STEEL KIT: Brand: GLIDESHEATH SLENDER

## (undated) DEVICE — MICROPUNCTURE INTRODUCER SET SILHOUETTE TRANSITIONLESS WITH STAINLESS STEEL WIRE GUIDE: Brand: MICROPUNCTURE

## (undated) DEVICE — SOLUTION SET, MALE LUER LOCK ADAPTER

## (undated) DEVICE — SYR 50ML LR LCK 1ML GRAD NSAF --

## (undated) DEVICE — ANGIOGRAPHIC CATHETER: Brand: IMPULSE™

## (undated) DEVICE — PRE-CONNECTED EXCHANGEABLE BURR CATHETER AND BURR ADVANCING DEVICE: Brand: ROTALINK™ PLUS

## (undated) DEVICE — TUBING PRSS MON L6IN PVC M FEM CONN

## (undated) DEVICE — CATH DIAG IMPLS PIG 6FRX100CM -- IMPULSE 16599-40

## (undated) DEVICE — 3M™ TEGADERM™ TRANSPARENT FILM DRESSING FRAME STYLE, 1626W, 4 IN X 4-3/4 IN (10 CM X 12 CM), 50/CT 4CT/CASE: Brand: 3M™ TEGADERM™

## (undated) DEVICE — SWAN-GANZ HI-SHORE TRUE SIZE THERMODILUTION CATHETER: Brand: SWAN-GANZ HI-SHORE TRUE SIZE

## (undated) DEVICE — PINNACLE R/O II INTRODUCER SHEATH WITH RADIOPAQUE MARKER: Brand: PINNACLE

## (undated) DEVICE — GUIDEWIRE VASC L150CM DIA0.035IN FLX TIP L7CM PTFE STR FIX

## (undated) DEVICE — AMPLATZ EXTRA STIFF WIRE GUIDE: Brand: AMPLATZ

## (undated) DEVICE — PERCLOSE PROGLIDE™ SUTURE-MEDIATED CLOSURE SYSTEM: Brand: PERCLOSE PROGLIDE™

## (undated) DEVICE — SPECIAL PROCEDURE DRAPE 32" X 34": Brand: SPECIAL PROCEDURE DRAPE

## (undated) DEVICE — ROSEN CURVED WIRE GUIDE: Brand: ROSEN

## (undated) DEVICE — WRAP SURG W1.31XL1.34M CARD FOR PT 165-172CM THERMOWRP

## (undated) DEVICE — CUSTOM KT PTCA INFL DEV K05 00052M

## (undated) DEVICE — SYR LR LCK 1ML GRAD NSAF 30ML --

## (undated) DEVICE — CATH DIAG WR5 EXPO 5FRX100CM -- EXPO

## (undated) DEVICE — GUIDEWIRE VASC L260CM DIA0.035IN RAD 3MM J TIP L7CM PTFE

## (undated) DEVICE — GOWN,SIRUS,NONRNF,SETINSLV,2XL,18/CS: Brand: MEDLINE

## (undated) DEVICE — CATH ANGI BLLN DIL 3.0X20MM -- NC EUPHORA

## (undated) DEVICE — SUPPORT WRST AD W3.5XL9IN DIA14.5IN ART SFT ADJ HK AND LOOP

## (undated) DEVICE — Device

## (undated) DEVICE — COPILOT BLEEDBACK CONTROL VALVE: Brand: COPILOT

## (undated) DEVICE — Device: Brand: PORTEX

## (undated) DEVICE — INFECTION CONTROL KIT SYS

## (undated) DEVICE — Z DUPLICATE USE 2526246 STOPCOCK IV R 3 W HI PRSS

## (undated) DEVICE — COVER,TABLE,60X90,STERILE: Brand: MEDLINE

## (undated) DEVICE — RUNTHROUGH NS EXTRA FLOPPY PTCA GUIDEWIRE: Brand: RUNTHROUGH

## (undated) DEVICE — GDWIRE COR 0.035INX260CM -- CONFIDA